# Patient Record
Sex: FEMALE | Race: WHITE | NOT HISPANIC OR LATINO | Employment: OTHER | URBAN - METROPOLITAN AREA
[De-identification: names, ages, dates, MRNs, and addresses within clinical notes are randomized per-mention and may not be internally consistent; named-entity substitution may affect disease eponyms.]

---

## 2017-02-28 ENCOUNTER — TELEPHONE (OUTPATIENT)
Dept: MEDICAL GROUP | Facility: PHYSICIAN GROUP | Age: 67
End: 2017-02-28

## 2017-02-28 DIAGNOSIS — Z01.812 PRE-PROCEDURE LAB EXAM: ICD-10-CM

## 2017-02-28 NOTE — TELEPHONE ENCOUNTER
VOICEMAIL  1. Caller Name: [facility]                       Call Back Number: [phone] (home)       2. Message: Pt needs STAT labs for her MRI in the morning.    3. Patient approves office to leave a detailed voicemail/MyChart message: N\A

## 2017-03-02 ENCOUNTER — APPOINTMENT (OUTPATIENT)
Dept: RADIOLOGY | Facility: MEDICAL CENTER | Age: 67
End: 2017-03-02
Attending: FAMILY MEDICINE
Payer: MEDICARE

## 2017-03-16 ENCOUNTER — APPOINTMENT (OUTPATIENT)
Dept: MEDICAL GROUP | Facility: PHYSICIAN GROUP | Age: 67
End: 2017-03-16
Payer: MEDICARE

## 2017-03-30 ENCOUNTER — APPOINTMENT (OUTPATIENT)
Dept: RADIOLOGY | Facility: MEDICAL CENTER | Age: 67
End: 2017-03-30
Attending: FAMILY MEDICINE
Payer: MEDICARE

## 2017-04-06 ENCOUNTER — APPOINTMENT (OUTPATIENT)
Dept: MEDICAL GROUP | Facility: PHYSICIAN GROUP | Age: 67
End: 2017-04-06
Payer: MEDICARE

## 2017-04-10 ENCOUNTER — HOSPITAL ENCOUNTER (OUTPATIENT)
Dept: LAB | Facility: MEDICAL CENTER | Age: 67
End: 2017-04-10
Attending: FAMILY MEDICINE
Payer: MEDICARE

## 2017-04-10 DIAGNOSIS — G56.01 CARPAL TUNNEL SYNDROME ON RIGHT: ICD-10-CM

## 2017-04-10 DIAGNOSIS — E78.5 DYSLIPIDEMIA: ICD-10-CM

## 2017-04-10 DIAGNOSIS — R41.3 MEMORY LOSS: ICD-10-CM

## 2017-04-10 DIAGNOSIS — I10 ESSENTIAL HYPERTENSION: ICD-10-CM

## 2017-04-10 DIAGNOSIS — I73.9 PAD (PERIPHERAL ARTERY DISEASE) (HCC): ICD-10-CM

## 2017-04-10 DIAGNOSIS — F32.A DEPRESSION: ICD-10-CM

## 2017-04-10 DIAGNOSIS — Z01.812 PRE-PROCEDURE LAB EXAM: ICD-10-CM

## 2017-04-10 LAB
BASOPHILS # BLD AUTO: 0.7 % (ref 0–1.8)
BASOPHILS # BLD: 0.05 K/UL (ref 0–0.12)
CREAT SERPL-MCNC: 0.59 MG/DL (ref 0.5–1.4)
EOSINOPHIL # BLD AUTO: 0.13 K/UL (ref 0–0.51)
EOSINOPHIL NFR BLD: 1.7 % (ref 0–6.9)
ERYTHROCYTE [DISTWIDTH] IN BLOOD BY AUTOMATED COUNT: 46.7 FL (ref 35.9–50)
GFR SERPL CREATININE-BSD FRML MDRD: >60 ML/MIN/1.73 M 2
HCT VFR BLD AUTO: 38.2 % (ref 37–47)
HGB BLD-MCNC: 12.7 G/DL (ref 12–16)
IMM GRANULOCYTES # BLD AUTO: 0.03 K/UL (ref 0–0.11)
IMM GRANULOCYTES NFR BLD AUTO: 0.4 % (ref 0–0.9)
LYMPHOCYTES # BLD AUTO: 2.48 K/UL (ref 1–4.8)
LYMPHOCYTES NFR BLD: 32.4 % (ref 22–41)
MCH RBC QN AUTO: 32.6 PG (ref 27–33)
MCHC RBC AUTO-ENTMCNC: 33.2 G/DL (ref 33.6–35)
MCV RBC AUTO: 97.9 FL (ref 81.4–97.8)
MONOCYTES # BLD AUTO: 0.56 K/UL (ref 0–0.85)
MONOCYTES NFR BLD AUTO: 7.3 % (ref 0–13.4)
NEUTROPHILS # BLD AUTO: 4.4 K/UL (ref 2–7.15)
NEUTROPHILS NFR BLD: 57.5 % (ref 44–72)
NRBC # BLD AUTO: 0 K/UL
NRBC BLD AUTO-RTO: 0 /100 WBC
PLATELET # BLD AUTO: 281 K/UL (ref 164–446)
PMV BLD AUTO: 9.5 FL (ref 9–12.9)
RBC # BLD AUTO: 3.9 M/UL (ref 4.2–5.4)
VIT B12 SERPL-MCNC: 546 PG/ML (ref 211–911)
WBC # BLD AUTO: 7.7 K/UL (ref 4.8–10.8)

## 2017-04-10 PROCEDURE — 36415 COLL VENOUS BLD VENIPUNCTURE: CPT

## 2017-04-10 PROCEDURE — 82607 VITAMIN B-12: CPT

## 2017-04-10 PROCEDURE — 85025 COMPLETE CBC W/AUTO DIFF WBC: CPT

## 2017-04-10 PROCEDURE — 82565 ASSAY OF CREATININE: CPT

## 2017-04-13 ENCOUNTER — TELEPHONE (OUTPATIENT)
Dept: MEDICAL GROUP | Facility: PHYSICIAN GROUP | Age: 67
End: 2017-04-13

## 2017-04-13 ENCOUNTER — HOSPITAL ENCOUNTER (OUTPATIENT)
Dept: RADIOLOGY | Facility: MEDICAL CENTER | Age: 67
End: 2017-04-13
Attending: FAMILY MEDICINE
Payer: MEDICARE

## 2017-04-13 DIAGNOSIS — R41.3 MEMORY LOSS: ICD-10-CM

## 2017-04-13 DIAGNOSIS — I67.1 ANEURYSM, CAROTID ARTERY, INTERNAL: ICD-10-CM

## 2017-04-13 PROCEDURE — A9579 GAD-BASE MR CONTRAST NOS,1ML: HCPCS | Performed by: FAMILY MEDICINE

## 2017-04-13 PROCEDURE — 700117 HCHG RX CONTRAST REV CODE 255: Performed by: FAMILY MEDICINE

## 2017-04-13 PROCEDURE — 70553 MRI BRAIN STEM W/O & W/DYE: CPT

## 2017-04-13 RX ADMIN — GADODIAMIDE 2870 MG: 287 INJECTION INTRAVENOUS at 10:05

## 2017-04-13 NOTE — TELEPHONE ENCOUNTER
Phone Number Called: [phone] (home)       Message: Please call radiologist Dr. Wells at [phone]. He would like to talk to you about MRI rslt.     Left Message for patient to call back: N\A

## 2017-04-13 NOTE — TELEPHONE ENCOUNTER
Spoke with Dr. Wells. He explained results of the MRI. He states that they can do angiogram and intervene at that time. He states that neurosurgeons normally don't operate anymore on these cases and send them for interventional radiology. I told Dr. Wells that patient prefers to see the neurologist first. Most likely they would end up sending her for intervention by the radiologist.

## 2017-05-05 ENCOUNTER — TELEPHONE (OUTPATIENT)
Dept: MEDICAL GROUP | Facility: PHYSICIAN GROUP | Age: 67
End: 2017-05-05

## 2017-05-05 DIAGNOSIS — I10 ESSENTIAL HYPERTENSION: ICD-10-CM

## 2017-05-05 RX ORDER — AMLODIPINE BESYLATE 10 MG/1
10 TABLET ORAL DAILY
Qty: 30 TAB | Refills: 3 | Status: SHIPPED | OUTPATIENT
Start: 2017-05-05 | End: 2017-10-01 | Stop reason: SDUPTHER

## 2017-05-05 NOTE — TELEPHONE ENCOUNTER
----- Message from Your Healthcare Team sent at 5/5/2017 10:33 AM PDT -----  Regarding: Prescription Question  Contact: [phone]  Dear Dr. Sarabia,    I have noted that my blood pressure is elevating.  Reading at Dr. Curiel's office was 154/102.  Should I go back to my blood pressure medication?  I don't remember what it was nor do I have any left.  If I should resume the medication, could you please send the RX over to my pharmacy which is [facility] on [facility].  Thanks very much.    Alisa Gandara

## 2017-05-05 NOTE — TELEPHONE ENCOUNTER
Yes please let pt know to restart amlodipine. Continue to monitor BP and follow up with Dr. Sarabia within two weeks  Francoise Elias M.D.

## 2017-05-05 NOTE — TELEPHONE ENCOUNTER
1. Caller Name: Reina Gandara                      Call Back Number: [phone] (home)     2. Message: Pt noticed elevated blood pressure of 154/102 wanted to know if she should go back on bp medication and if so send to [facility] on [facility]. To Dr. Sarabia to advise.     3. Patient approves office to leave a detailed voicemail/MyChart message: N\A

## 2017-05-16 ENCOUNTER — OFFICE VISIT (OUTPATIENT)
Dept: MEDICAL GROUP | Facility: PHYSICIAN GROUP | Age: 67
End: 2017-05-16
Payer: MEDICARE

## 2017-05-16 VITALS
BODY MASS INDEX: 17.47 KG/M2 | DIASTOLIC BLOOD PRESSURE: 60 MMHG | OXYGEN SATURATION: 98 % | HEIGHT: 60 IN | WEIGHT: 89 LBS | RESPIRATION RATE: 20 BRPM | TEMPERATURE: 99.1 F | HEART RATE: 82 BPM | SYSTOLIC BLOOD PRESSURE: 110 MMHG

## 2017-05-16 DIAGNOSIS — E78.5 DYSLIPIDEMIA: ICD-10-CM

## 2017-05-16 DIAGNOSIS — I67.1 ANEURYSM OF CAVERNOUS PORTION OF LEFT INTERNAL CAROTID ARTERY: ICD-10-CM

## 2017-05-16 DIAGNOSIS — I67.1 RIGHT INTERNAL CAROTID ARTERY ANEURYSM: ICD-10-CM

## 2017-05-16 DIAGNOSIS — I73.9 PAD (PERIPHERAL ARTERY DISEASE) (HCC): ICD-10-CM

## 2017-05-16 DIAGNOSIS — I10 ESSENTIAL HYPERTENSION: ICD-10-CM

## 2017-05-16 PROCEDURE — 4040F PNEUMOC VAC/ADMIN/RCVD: CPT | Performed by: FAMILY MEDICINE

## 2017-05-16 PROCEDURE — 3014F SCREEN MAMMO DOC REV: CPT | Mod: 8P | Performed by: FAMILY MEDICINE

## 2017-05-16 PROCEDURE — 3288F FALL RISK ASSESSMENT DOCD: CPT | Performed by: FAMILY MEDICINE

## 2017-05-16 PROCEDURE — 99214 OFFICE O/P EST MOD 30 MIN: CPT | Performed by: FAMILY MEDICINE

## 2017-05-16 PROCEDURE — G8432 DEP SCR NOT DOC, RNG: HCPCS | Performed by: FAMILY MEDICINE

## 2017-05-16 PROCEDURE — 0518F FALL PLAN OF CARE DOCD: CPT | Mod: 8P | Performed by: FAMILY MEDICINE

## 2017-05-16 PROCEDURE — G8419 CALC BMI OUT NRM PARAM NOF/U: HCPCS | Performed by: FAMILY MEDICINE

## 2017-05-16 PROCEDURE — 4004F PT TOBACCO SCREEN RCVD TLK: CPT | Performed by: FAMILY MEDICINE

## 2017-05-16 PROCEDURE — 1100F PTFALLS ASSESS-DOCD GE2>/YR: CPT | Performed by: FAMILY MEDICINE

## 2017-05-16 NOTE — MR AVS SNAPSHOT
Reina Gandara   2017 3:20 PM   Office Visit   MRN: [id number]    Department:  [facility]   Dept Phone:  [phone]    Description:  Female : 1950   Provider:  Soledad Sarabia M.D.           Reason for Visit     Follow-Up 3 month follow up     Other allergies are really bad       Allergies as of 2017     Allergen Noted Reactions    Bee 2014   Anaphylaxis    Codeine 2013   Nausea    dizzy    Keflex 2013   Vomiting    Lisinopril 2013       cough    Nsaids 2013   Swelling    2 trips to ER w reactions of rash swelling       You were diagnosed with     Right internal carotid artery aneurysm   [812544]       Aneurysm of cavernous portion of left internal carotid artery   [447189]       Essential hypertension   [7826050]       Dyslipidemia   [812745]       PAD (peripheral artery disease) (CMS-[facility])   [713247]         Vital Signs     Blood Pressure Pulse Temperature Respirations Height Weight    110/60 mmHg 82 37.3 °C (99.1 °F) 20 1.524 m (5') 40.37 kg (89 lb)    Body Mass Index Oxygen Saturation Smoking Status             17.38 kg/m2 98% Current Every Day Smoker         Basic Information     Date Of Birth Sex Race Ethnicity Preferred Language    1950 Female White Non- English      Your appointments     Aug 16, 2017 10:20 AM   Established Patient with Soledad Sarabia M.D.   [facility] (--)    [address] [address]  [address]  [place] [id number] [phone]           You will be receiving a confirmation call a few days before your appointment from our automated call confirmation system.              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   9/17/2013 - Present    Other specified pre-operative examination Z01.818   10/4/2013 - Present    Dyslipidemia E78.5   8/11/2014 - Present    H/O bee sting allergy Z91.030   8/26/2014 - Present    Depression F32.9   12/9/2014 - Present    Insomnia G47.00   12/9/2014 - Present    Contact dermatitis due to cosmetics L25.0   9/16/2015 - Present    PAD (peripheral artery disease) (CMS-[facility]) I73.9   1/25/2016 - Present    Essential hypertension I10   8/25/2016 - Present      Health Maintenance        Date Due Completion Dates    IMM DTaP/Tdap/Td Vaccine (1 - Tdap) 6/22/1967 ---    MAMMOGRAM 7/20/2016 7/20/2015, 5/19/2014, 10/5/2009, 10/5/2009, 9/9/2008, 9/9/2008, 6/22/2006, 9/27/2004    COLON CANCER SCREENING ANNUAL FIT 11/8/2017 11/8/2016, 8/9/2014    BONE DENSITY 7/20/2020 7/20/2015, 6/22/2006, 9/27/2004            Current Immunizations     13-VALENT PCV PREVNAR 1/25/2016    Influenza Vaccine Adult HD 11/7/2016, 1/25/2016    Influenza Vaccine Quad Inj (Preserved) 12/9/2014    Pneumococcal polysaccharide vaccine (PPSV-23) 7/9/2013  1:49 PM    SHINGLES VACCINE 8/6/2014      Below and/or attached are the medications your provider expects you to take. Review all of your home medications and newly ordered medications with your provider and/or pharmacist. Follow medication instructions as directed by your provider and/or pharmacist. Please keep your medication list with you and share with your provider. Update the information when medications are discontinued, doses are changed, or new medications (including over-the-counter products) are added; and carry medication information at all times in the event of emergency situations     Allergies:  BEE - Anaphylaxis     CODEINE - Nausea     KEFLEX - Vomiting     LISINOPRIL - (reactions not documented)     NSAIDS - Swelling               Medications  Valid  as of: May 16, 2017 -  3:52 PM    Generic Name Brand Name Tablet Size Instructions for use    Amitriptyline HCl (Tab) ELAVIL 25 MG Take 25 mg by mouth every evening.        AmLODIPine Besylate (Tab) NORVASC 10 MG Take 1 Tab by mouth every day.        Ascorbic Acid (Tab CR) Vitamin C CR 1000 MG Take  by mouth.        Atorvastatin Calcium (Tab) LIPITOR 40 MG TAKE 1 TAB BY MOUTH EVERY BEDTIME.        Cholecalciferol (Cap) Vitamin D3 2000 UNIT Take  by mouth.        Clobetasol Propionate (Cream) TEMOVATE 0.05 % Apply to affected areas twice daily        Clopidogrel Bisulfate (Tab) PLAVIX 75 MG Take 1 Tab by mouth every day.        EPINEPHrine (Solution Auto-injector) EPIPEN 0.3 MG/0.3ML 1 PEN by Injection route as needed. Indications: Life-Threatening Allergic Reaction        Fluticasone Propionate (Suspension) FLONASE 50 MCG/ACT Spray 2 Sprays in nose every day.        Gabapentin (Tab) NEURONTIN 800 MG Take 300 mg by mouth 2 times a day.        Multiple Vitamin   Take  by mouth.        Omega-3 Fatty Acids   Take  by mouth.        PARoxetine HCl (Tab) PAXIL 10 MG TAKE 1 TABLET BY MOUTH EVERY DAY WITH FOOD        Pravastatin Sodium (Tab) PRAVACHOL 20 MG TAKE 1 TABLET BY MOUTH AT BEDTIME        Pravastatin Sodium (Tab) PRAVACHOL 40 MG Take 1 Tab by mouth every bedtime.        Triamcinolone Acetonide (Ointment) KENALOG 0.1 % Apply 0.5 g to affected area(s) 2 times a day.        .                 Medicines prescribed today were sent to:     [id number] - [place] - [address] [address]    Phone: [phone] Fax: [phone]    Open 24 Hours?: No      Medication refill instructions:       If your prescription bottle indicates you have medication refills left, it is not necessary to call your provider’s office. Please contact your pharmacy and they will refill your medication.    If your prescription bottle indicates you do not have any refills left, you may request refills at  any time through one of the following ways: The online [email or web address] system (except Urgent Care), by calling your provider’s office, or by asking your pharmacy to contact your provider’s office with a refill request. Medication refills are processed only during regular business hours and may not be available until the next business day. Your provider may request additional information or to have a follow-up visit with you prior to refilling your medication.   *Please Note: Medication refills are assigned a new Rx number when refilled electronically. Your pharmacy may indicate that no refills were authorized even though a new prescription for the same medication is available at the pharmacy. Please request the medicine by name with the pharmacy before contacting your provider for a refill.        Your To Do List     Future Labs/Procedures Complete By Expires    COMP METABOLIC PANEL  As directed 5/17/2018    LIPID PROFILE  As directed 5/17/2018      Other Notes About Your Plan     DOES PATIENT HAVE A HISTORY OF ALCOHOL OR OPIOID DEPENDENCE? This was diagnosed in 2014. Please add if this is the case. Thanks!           [email or web address] Access Code: Activation code not generated  Current [email or web address] Status: Active          Quit Tobacco Information     Do you want to quit using tobacco?    Quitting tobacco decreases risks of cancer, heart and lung disease, increases life expectancy, improves sense of taste and smell, and increases spending money, among other benefits.    If you are thinking about quitting, we can help.  • [facility] Quit Tobacco Program: [phone]  o Program occurs weekly for four weeks and includes pharmacist consultation on products to support quitting smoking or chewing tobacco. A provider referral is needed for pharmacist consultation.  • Tobacco Users Help Hotline: 1-347-QUIT-NOW (608-9783) or https://[place].quitlogix.org/  o Free, confidential telephone and online coaching for [place] residents. Sessions are designed on  a schedule that is convenient for you. Eligible clients receive free nicotine replacement therapy.  • Nationally: www.smokefree.gov  o Information and professional assistance to support both immediate and long-term needs as you become, and remain, a non-smoker. Smokefree.gov allows you to choose the help that best fits your needs.

## 2017-05-17 NOTE — PROGRESS NOTES
Subjective:      Reina Gandara is a 66 y.o. female who presents with Follow-Up and Other            Other      Patient returns for follow-up of her medical problems.    We found right internal carotid artery aneurysm 1 cm and left cavernous internal carotid artery aneurysm 7 mm when we did MRI of the brain for evaluation of memory loss. I referred her to a neurosurgeon and she was recommended coil repair. She was given option of doing endovascular versus open procedure. She is not decided that on which route to proceed. She denies any headache, dizziness, lightheadedness.    We started her back on amlodipine because her blood pressure started to run high last week. She said that the neurosurgeon's office it was 156/102. Patient was on amlodipine for hypertension in the past but this was discontinued when her blood pressure started to run very good and she did not need the medication anymore. Her blood pressure is down to normal level on the medication. She has no side effects.    In terms of her dyslipidemia, she continues to take atorvastatin without any myalgias.    She continues to take Plavix for her peripheral arterial disease. She was told by the neurosurgeon that she can continue taking the Plavix. She is status post femoropopliteal of the left lower extremity in 2013. She has been asymptomatic without any claudication    Past medical history, past surgical history, family history reviewed-no changes    Social history reviewed-no changes    Allergies reviewed-no changes    Medications reviewed-no changes    ROS     Review of systems as per history of present illness, the rest are negative.     Objective:     /60 mmHg  Pulse 82  Temp(Src) 37.3 °C (99.1 °F)  Resp 20  Ht 1.524 m (5')  Wt 40.37 kg (89 lb)  BMI 17.38 kg/m2  SpO2 98%     Physical Exam     Examined alert, awake, oriented, not in distress    Neck-supple, no lymphadenopathy, no thyromegaly  Lungs-clear to auscultation, no rales, no  wheezes  Heart-regular rate and rhythm, no murmur  Extremities-mild nonpitting edema of the left foot which is chronic finding due to previous femoropopliteal surgery, no clubbing, no cyanosis       No visits with results within 1 Month(s) from this visit.  Latest known visit with results is:    Hospital Outpatient Visit on 04/10/2017   Component Date Value   • WBC 04/10/2017 7.7    • RBC 04/10/2017 3.90*   • Hemoglobin 04/10/2017 12.7    • Hematocrit 04/10/2017 38.2    • MCV 04/10/2017 97.9*   • MCH 04/10/2017 32.6    • MCHC 04/10/2017 33.2*   • RDW 04/10/2017 46.7    • Platelet Count 04/10/2017 281    • MPV 04/10/2017 9.5    • Neutrophils-Polys 04/10/2017 57.50    • Lymphocytes 04/10/2017 32.40    • Monocytes 04/10/2017 7.30    • Eosinophils 04/10/2017 1.70    • Basophils 04/10/2017 0.70    • Immature Granulocytes 04/10/2017 0.40    • Nucleated RBC 04/10/2017 0.00    • Neutrophils (Absolute) 04/10/2017 4.40    • Lymphs (Absolute) 04/10/2017 2.48    • Monos (Absolute) 04/10/2017 0.56    • Eos (Absolute) 04/10/2017 0.13    • Baso (Absolute) 04/10/2017 0.05    • Immature Granulocytes (a* 04/10/2017 0.03    • NRBC (Absolute) 04/10/2017 0.00    • Vitamin B12 -True Cobala* 04/10/2017 546    • Creatinine 04/10/2017 0.59    • GFR If  04/10/2017 >60    • GFR If Non  Ameri* 04/10/2017 >60         Assessment/Plan:     1. Right internal carotid artery aneurysm  She said she will proceed with the coiling but she doesn't know yet if she wants the endovascular versus open procedure. She is currently asymptomatic.  - LIPID PROFILE; Future  - COMP METABOLIC PANEL; Future    2. Aneurysm of cavernous portion of left internal carotid artery  Same as #1.  - LIPID PROFILE; Future  - COMP METABOLIC PANEL; Future    3. Essential hypertension  Blood pressure is now normal on amlodipine. Continue amlodipine.  - LIPID PROFILE; Future  - COMP METABOLIC PANEL; Future    4. Dyslipidemia  We will check lipid panel.  Continue atorvastatin.  - LIPID PROFILE; Future  - COMP METABOLIC PANEL; Future    5. PAD (peripheral artery disease) (CMS-HCC)  Status post femoropopliteal left lower extremity. Currently asymptomatic on Plavix.  - LIPID PROFILE; Future  - COMP METABOLIC PANEL; Future      Please note that this dictation was created using voice recognition software. I have worked with consultants from the vendor as well as technical experts from [facility]  [employer] to optimize the interface. I have made every reasonable attempt to correct obvious errors, but I expect that there are errors of grammar and possibly content I did not discover before finalizing the note.

## 2017-06-05 ENCOUNTER — PATIENT OUTREACH (OUTPATIENT)
Dept: HEALTH INFORMATION MANAGEMENT | Facility: OTHER | Age: 67
End: 2017-06-05

## 2017-06-05 NOTE — PROGRESS NOTES
Outcome: No Answer- Vm full    WebIZ Checked & Epic Updated:  yes    HealthConnect Verified: yes    Attempt # 1

## 2017-06-19 NOTE — PROGRESS NOTES
Attempt #:2    WebIZ Checked & Epic Updated: yes  HealthConnect Verified: yes  Verify PCP: yes    Communication Preference Obtained: yes     Review Care Team: yes    Annual Wellness Visit Scheduling  1. Scheduling Status:Scheduled     Care Gap Scheduling (Attempt to Schedule EACH Overdue Care Gap!)     Health Maintenance Due   Topic Date Due   • IMM DTaP/Tdap/Td Vaccine (1 - Tdap) Scheduled   • MAMMOGRAM  Declined- Will discuss with PCP         [employer] Activation: already active  Linio Deisrae: no  Virtual Visits: no  Opt In to Text Messages: no

## 2017-07-17 RX ORDER — CLOPIDOGREL BISULFATE 75 MG/1
TABLET ORAL
Qty: 90 TAB | Refills: 3 | Status: SHIPPED | OUTPATIENT
Start: 2017-07-17 | End: 2018-01-16

## 2017-08-16 ENCOUNTER — APPOINTMENT (OUTPATIENT)
Dept: MEDICAL GROUP | Facility: PHYSICIAN GROUP | Age: 67
End: 2017-08-16
Payer: MEDICARE

## 2017-08-30 ENCOUNTER — TELEPHONE (OUTPATIENT)
Dept: MEDICAL GROUP | Facility: PHYSICIAN GROUP | Age: 67
End: 2017-08-30

## 2017-08-30 NOTE — TELEPHONE ENCOUNTER
ANNUAL WELLNESS VISIT PRE-VISIT PLANNING     1.  Reviewed note from last office visit with PCP: YES    2.  If any orders were placed at last visit, do we have Results/Consult Notes?        •  Labs - Labs ordered, NOT completed. Patient advised to complete prior to next appointment.       •  Imaging - Imaging was not ordered at last office visit.       •  Referrals - No referrals were ordered at last office visit.    3.  Immunizations were updated in [facility] using WebIZ?: Yes       •  WebIZ Recommendations: TDAP       •  Is patient due for Tdap? YES. Patient was not notified of copay.       •  Is patient due for Shingles? NO     4.  Patient is due for the following Health Maintenance Topics:   Health Maintenance Due   Topic Date Due   • IMM DTaP/Tdap/Td Vaccine (1 - Tdap) 06/22/1967   • MAMMOGRAM  07/20/2016   • IMM INFLUENZA (1) 09/01/2017       - Patient has completed FLU, PNEUMOVAX (PPSV23), PREVNAR (PCV13)  and ZOSTAVAX (Shingles) Immunization(s) per WebIZ. Chart has been updated.      5.  Reviewed/Updated the following with patient:       •   Preferred Pharmacy? YES       •   Preferred Lab? YES       •   Medications? YES. Was Abstract Encounter opened and chart updated? YES       •   Social History? YES. Was Abstract Encounter opened and chart updated? YES       •   Family History? YES. Was Abstract Encounter opened and chart updated? YES    6.  Care Team Updated:       •   DME Company (gait device, O2, CPAP, etc.): NO       •   Other Specialists (eye doctor, derm, GYN, cardiology, endo, etc): YES    7.  Patient has the following Care Path diagnoses on Problem List:  NONE    8.  Specialty Comments was updated with diagnosis information provided by [facility]: NO    9.  Patient was advised: “This is a free wellness visit. The provider will screen for medical conditions to help you stay healthy. If you have other concerns to address you may be asked to discuss these at a separate visit or there may be an additional fee.”      6.  Patient was informed to arrive 15 min prior to their scheduled appointment and bring in their medication bottles.

## 2017-10-01 DIAGNOSIS — I10 ESSENTIAL HYPERTENSION: ICD-10-CM

## 2017-10-02 RX ORDER — AMLODIPINE BESYLATE 10 MG/1
10 TABLET ORAL DAILY
Qty: 30 TAB | Refills: 5 | Status: SHIPPED | OUTPATIENT
Start: 2017-10-02 | End: 2018-01-26 | Stop reason: SDUPTHER

## 2017-10-16 ENCOUNTER — HOSPITAL ENCOUNTER (OUTPATIENT)
Dept: RADIOLOGY | Facility: MEDICAL CENTER | Age: 67
End: 2017-10-16
Attending: NEUROLOGICAL SURGERY
Payer: MEDICARE

## 2017-10-16 DIAGNOSIS — I67.1 CEREBRAL ANEURYSM, NONRUPTURED: ICD-10-CM

## 2017-10-16 PROCEDURE — 70544 MR ANGIOGRAPHY HEAD W/O DYE: CPT

## 2017-11-09 ENCOUNTER — TELEPHONE (OUTPATIENT)
Dept: MEDICAL GROUP | Facility: PHYSICIAN GROUP | Age: 67
End: 2017-11-09

## 2017-11-10 NOTE — TELEPHONE ENCOUNTER
ANNUAL WELLNESS VISIT PRE-VISIT PLANNING     1.  Reviewed note from last office visit with PCP: YES    2.  If any orders were placed at last visit, do we have Results/Consult Notes?        •  Labs - Labs ordered, NOT completed. Patient advised to complete prior to next appointment.   Note: If patient appointment is for lab review and patient did not complete labs, check with provider if OK to reschedule patient until labs completed.       •  Imaging - Imaging was not ordered at last office visit.       •  Referrals - No referrals were ordered at last office visit.    3.  Immunizations were updated in [facility] using WebIZ?: Yes       •  WebIZ Recommendations: TDAP. Flu       •  Is patient due for Tdap? YES       •  Is patient due for Shingles? YES. Patient was notified of copay/out of pocket cost.     4.  Patient is due for the following Health Maintenance Topics:   Health Maintenance Due   Topic Date Due   • IMM DTaP/Tdap/Td Vaccine (1 - Tdap) 06/22/1967   • MAMMOGRAM  07/20/2016   • IMM INFLUENZA (1) 09/01/2017   • Annual Wellness Visit  11/06/2017   • COLON CANCER SCREENING ANNUAL FIT  11/08/2017       - Patient has completed FLU, PNEUMOVAX (PPSV23), PREVNAR (PCV13)  and ZOSTAVAX (Shingles) Immunization(s) per WebIZ. Chart has been updated.      5.  Reviewed/Updated the following with patient:       •   Preferred Pharmacy? YES       •   Preferred Lab? YES       •   Preferred Communication? YES       •   Allergies? YES       •   Medications? YES. Was Abstract Encounter opened and chart updated? YES       •   Social History? YES. Was Abstract Encounter opened and chart updated? YES       •   Family History (document living status of immediate family members and if + hx of cancer, diabetes, hypertension, hyperlipidemia, heart attack, stroke) YES. Was Abstract Encounter opened and chart updated? YES    6.  Care Team Updated:       •   DME Company (gait device, O2, CPAP, etc.): NO       •   Other Specialists (eye doctor,  derm, GYN, cardiology, endo, etc): NO    7.  Patient has the following Care Path diagnoses on Problem List:  NONE    8.  Specialty Comments was updated with diagnosis information provided by [facility]: NO    9.  Patient was advised: “This is a free wellness visit. The provider will screen for medical conditions to help you stay healthy. If you have other concerns to address you may be asked to discuss these at a separate visit or there may be an additional fee.”     6.  Patient was informed to arrive 15 min prior to their scheduled appointment and bring in their medication bottles.

## 2017-11-16 ENCOUNTER — APPOINTMENT (OUTPATIENT)
Dept: MEDICAL GROUP | Facility: PHYSICIAN GROUP | Age: 67
End: 2017-11-16
Payer: MEDICARE

## 2018-01-11 ENCOUNTER — HOSPITAL ENCOUNTER (OUTPATIENT)
Dept: LAB | Facility: MEDICAL CENTER | Age: 68
End: 2018-01-11
Attending: FAMILY MEDICINE
Payer: MEDICARE

## 2018-01-11 DIAGNOSIS — I10 ESSENTIAL HYPERTENSION: ICD-10-CM

## 2018-01-11 DIAGNOSIS — E78.5 DYSLIPIDEMIA: ICD-10-CM

## 2018-01-11 DIAGNOSIS — I73.9 PAD (PERIPHERAL ARTERY DISEASE) (HCC): ICD-10-CM

## 2018-01-11 DIAGNOSIS — I67.1 RIGHT INTERNAL CAROTID ARTERY ANEURYSM: ICD-10-CM

## 2018-01-11 DIAGNOSIS — I67.1 ANEURYSM OF CAVERNOUS PORTION OF LEFT INTERNAL CAROTID ARTERY: ICD-10-CM

## 2018-01-11 LAB
ALBUMIN SERPL BCP-MCNC: 4.5 G/DL (ref 3.2–4.9)
ALBUMIN/GLOB SERPL: 1.9 G/DL
ALP SERPL-CCNC: 72 U/L (ref 30–99)
ALT SERPL-CCNC: 14 U/L (ref 2–50)
ANION GAP SERPL CALC-SCNC: 8 MMOL/L (ref 0–11.9)
AST SERPL-CCNC: 19 U/L (ref 12–45)
BILIRUB SERPL-MCNC: 0.6 MG/DL (ref 0.1–1.5)
BUN SERPL-MCNC: 10 MG/DL (ref 8–22)
CALCIUM SERPL-MCNC: 9 MG/DL (ref 8.5–10.5)
CHLORIDE SERPL-SCNC: 106 MMOL/L (ref 96–112)
CHOLEST SERPL-MCNC: 238 MG/DL (ref 100–199)
CO2 SERPL-SCNC: 25 MMOL/L (ref 20–33)
CREAT SERPL-MCNC: 0.46 MG/DL (ref 0.5–1.4)
GLOBULIN SER CALC-MCNC: 2.4 G/DL (ref 1.9–3.5)
GLUCOSE SERPL-MCNC: 89 MG/DL (ref 65–99)
HDLC SERPL-MCNC: 72 MG/DL
LDLC SERPL CALC-MCNC: 121 MG/DL
POTASSIUM SERPL-SCNC: 4.3 MMOL/L (ref 3.6–5.5)
PROT SERPL-MCNC: 6.9 G/DL (ref 6–8.2)
SODIUM SERPL-SCNC: 139 MMOL/L (ref 135–145)
TRIGL SERPL-MCNC: 227 MG/DL (ref 0–149)

## 2018-01-11 PROCEDURE — 80053 COMPREHEN METABOLIC PANEL: CPT

## 2018-01-11 PROCEDURE — 80061 LIPID PANEL: CPT

## 2018-01-11 PROCEDURE — 36415 COLL VENOUS BLD VENIPUNCTURE: CPT

## 2018-01-16 ENCOUNTER — OFFICE VISIT (OUTPATIENT)
Dept: MEDICAL GROUP | Facility: LAB | Age: 68
End: 2018-01-16
Payer: MEDICARE

## 2018-01-16 VITALS
OXYGEN SATURATION: 98 % | RESPIRATION RATE: 12 BRPM | TEMPERATURE: 98.8 F | BODY MASS INDEX: 17.18 KG/M2 | HEIGHT: 61 IN | SYSTOLIC BLOOD PRESSURE: 102 MMHG | DIASTOLIC BLOOD PRESSURE: 62 MMHG | WEIGHT: 91 LBS | HEART RATE: 87 BPM

## 2018-01-16 DIAGNOSIS — R92.30 DENSE BREAST TISSUE: ICD-10-CM

## 2018-01-16 DIAGNOSIS — L24.89 IRRITANT CONTACT DERMATITIS DUE TO OTHER AGENTS: ICD-10-CM

## 2018-01-16 DIAGNOSIS — E78.5 DYSLIPIDEMIA: ICD-10-CM

## 2018-01-16 DIAGNOSIS — Z87.442 HISTORY OF KIDNEY STONES: ICD-10-CM

## 2018-01-16 DIAGNOSIS — I72.9 ANEURYSM (HCC): ICD-10-CM

## 2018-01-16 DIAGNOSIS — R79.89 ABNORMAL TSH: ICD-10-CM

## 2018-01-16 DIAGNOSIS — Z91.030 H/O BEE STING ALLERGY: ICD-10-CM

## 2018-01-16 DIAGNOSIS — Z23 NEED FOR VACCINATION: ICD-10-CM

## 2018-01-16 DIAGNOSIS — I10 ESSENTIAL HYPERTENSION: ICD-10-CM

## 2018-01-16 DIAGNOSIS — Z86.69 HX OF GUILLAIN-BARRE SYNDROME: ICD-10-CM

## 2018-01-16 DIAGNOSIS — I73.9 PAD (PERIPHERAL ARTERY DISEASE) (HCC): ICD-10-CM

## 2018-01-16 DIAGNOSIS — Z87.898 HISTORY OF SEIZURES: ICD-10-CM

## 2018-01-16 DIAGNOSIS — Z12.11 SPECIAL SCREENING FOR MALIGNANT NEOPLASM OF COLON: ICD-10-CM

## 2018-01-16 PROCEDURE — G0008 ADMIN INFLUENZA VIRUS VAC: HCPCS | Performed by: NURSE PRACTITIONER

## 2018-01-16 PROCEDURE — 90662 IIV NO PRSV INCREASED AG IM: CPT | Performed by: NURSE PRACTITIONER

## 2018-01-16 PROCEDURE — 99215 OFFICE O/P EST HI 40 MIN: CPT | Mod: 25 | Performed by: NURSE PRACTITIONER

## 2018-01-16 RX ORDER — EPINEPHRINE 0.3 MG/.3ML
0.3 INJECTION SUBCUTANEOUS PRN
Qty: 1 EACH | Status: ON HOLD | OUTPATIENT
Start: 2018-01-16 | End: 2018-05-25

## 2018-01-16 ASSESSMENT — PATIENT HEALTH QUESTIONNAIRE - PHQ9
5. POOR APPETITE OR OVEREATING: 0 - NOT AT ALL
CLINICAL INTERPRETATION OF PHQ2 SCORE: 2

## 2018-01-16 NOTE — PROGRESS NOTES
CC  Est care and f/u on chronic issue    HPI   Alisa is a 67-year-old female, new patient to me today. She is here to establish care and go over chronic issues.    Irritant contact dermatitis due to other agents  Chronic issue.  Caused by occasional cosmetics and relieved by Clobetasol.      Dyslipidemia  Chronic issue.  Lipid improvement was not significant with pravastatin and now does well with atorvastatin.  + PVD and brain aneurysms.   Took a break from atorvastatin for a few months and restarted it as soon as she saw her labs last week.      H/O bee sting allergy  Anaphylactic rxns to bee stings and all stinging insects.  Also allergic to all nsaids.  Has an epipen but it's  by one year.      PAD (peripheral artery disease) (CMS-HCC)  Chronic issue.  On plavix and statin.  Had an episode in  of limb ischemia fixed by Dr. Durbin - right side -stented, left also had an issue and that was fixed with balloon per patient.  Had quite a bit of pain for a year afterwards and had to take gabapentin but off now.  Occasional swelling in left foot but no other symptoms such as lower extremity pain with walking.      History of seizures  Last episode was in  in the middle of the night.  Had reoccurrence quickly thereafter and was told that she had and AV problem but arteriogram was negative.      History of kidney stones  As a child and had recurrent pyelonephritis.  No reoccurrence in about 20 years. Denies any problems with urination such as hematuria or dysuria.    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 Had lower extremity weakness for weeks per pt. has done very well with influenza vaccines in the past and is requesting a flu shot today.     Essential hypertension  Chronic issue.   Controlled well with amlodipine.  No CP or ankle swelling.      Past medical, surgical, family, and social history is reviewed and updated in Epic chart by me today.   Medications and allergies reviewed and updated in  "Epic chart by me today.     ROS:   As documented in history of present illness above    Exam:  Blood pressure 102/62, pulse 87, temperature 37.1 °C (98.8 °F), resp. rate 12, height 1.549 m (5' 1\"), weight 41.3 kg (91 lb), SpO2 98 %.  Constitutional: Alert, no distress, plus 3 vital signs  Skin:  Warm, dry, no rashes invisible areas  Eye: Equal, round and reactive, conjunctiva clear  ENMT: Lips without lesions, good dentition, oropharynx clear    Neck: Trachea midline  Respiratory: Unlabored respiration  Cardiovascular: Normal rate  Psych: Alert, pleasant, well-groomed, normal affect    A/P:  1. Aneurysm (CMS-HCC)  -This is followed by her neurosurgeon, Dr. Curiel    2. Irritant contact dermatitis due to other agents  -Stable with as needed use of clobetasol    3. Dyslipidemia  -She has restarted her atorvastatin and we'll recheck labs in 3 months.  - TSH WITH REFLEX TO FT4; Future  - LIPID PROFILE; Future    4. H/O bee sting allergy  -Refilled her EpiPen and reminded her of use as well as importance of calling 911 with any symptoms of anaphylactic reaction  - EPINEPHrine (EPIPEN 2-SHAVON) 0.3 MG/0.3ML Solution Auto-injector solution for injection; 0.3 mL by Intramuscular route as needed.  Dispense: 1 Each; Refill: prn    5. PAD (peripheral artery disease) (CMS-HCC)  -Asymptomatic. Continue on statin and Plavix. Follow-up as needed.    6. History of seizures  -This has not occurred in over 60 years.    7. History of kidney stones  -No occurrence within 20 years. Encouraged good hydration.    8. Hx of Guillain-[place] syndrome    9. Essential hypertension  -Stable. Continue same.    10. Dense breast tissue  -She does not want to do a mammogram but is willing to consider the below testing if covered by insurance.  - US-SCREENING BREAST (SONOCINE); Standing  - US-SCREENING BREAST (SONOCINE)    11. Special screening for malignant neoplasm of colon  -Declines a referral for a full colonoscopy, stating that she has heard a lot " of bad stories about complications during colonoscopies but she is willing to do a fecal occult blood test.  - OCCULT BLOOD FECES IMMUNOASSAY (FIT); Future    12. Need for vaccination  -I have placed the below orders and discussed them with Dr. Gentile. the MA is performing the below orders under the direction of Dr. NINA.  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  - INFLUENZA VACCINE, HIGH DOSE (65+ ONLY)    13. Abnormal TSH  -She reports that over 5 years ago she had a low TSH. We'll check a TSH with reflex to T4 when she repeat her lipid panel in 3 months.  - TSH WITH REFLEX TO FT4; Future    Total face to face time 40 minutes of which over 50% of this visit is spent in counseling, education and outlining a plan of treatment and coordination of care for the above conditions. This included but was not limited to discussion of medication options and potential risks related to the medications, referral and specialty care options. All patient questions were answered

## 2018-01-16 NOTE — ASSESSMENT & PLAN NOTE
Chronic issue.  On plavix and statin.  Had an episode in 2013 of limb ischemia fixed by Dr. Durbin - right side -stented, left also had an issue and that was fixed with balloon.  Had quite a bit of pain for a year afterwards and had to take gabapentin but off now.  Occasional swelling in left foot.

## 2018-01-16 NOTE — ASSESSMENT & PLAN NOTE
Anaphylactic rxns to bee stings and all stinging insects.  Also allergic to all nsaids.  Has an epipen but it's  by one year.

## 2018-01-16 NOTE — ASSESSMENT & PLAN NOTE
Last episode was in 1980's in the middle of the night.  Had reoccurrence quickly thereafter and was told that she had and AV problem but arteriogram was negative.

## 2018-01-26 DIAGNOSIS — I10 ESSENTIAL HYPERTENSION: ICD-10-CM

## 2018-01-26 RX ORDER — AMLODIPINE BESYLATE 10 MG/1
TABLET ORAL
Qty: 30 TAB | Refills: 0 | Status: SHIPPED | OUTPATIENT
Start: 2018-01-26 | End: 2018-05-23

## 2018-02-22 DIAGNOSIS — I72.9 ANEURYSM (HCC): ICD-10-CM

## 2018-03-03 DIAGNOSIS — I73.9 PAD (PERIPHERAL ARTERY DISEASE) (HCC): ICD-10-CM

## 2018-03-05 RX ORDER — CLOPIDOGREL BISULFATE 75 MG/1
75 TABLET ORAL
Qty: 90 TAB | Refills: 0 | Status: SHIPPED | OUTPATIENT
Start: 2018-03-05 | End: 2018-07-22 | Stop reason: SDUPTHER

## 2018-03-15 ENCOUNTER — HOSPITAL ENCOUNTER (OUTPATIENT)
Dept: RADIOLOGY | Facility: MEDICAL CENTER | Age: 68
End: 2018-03-15
Attending: NURSE PRACTITIONER

## 2018-03-15 DIAGNOSIS — I72.9 ANEURYSM (HCC): ICD-10-CM

## 2018-03-15 NOTE — CONSULTS
"Neuro Interventional Service Consultation      Re: Reina Gandara     MRN: [id number]   : 1950    Reina Gandara was referred to our service by Maria Eugenia DEE.  She is a 67 y.o. female seen in clinic for evaluation and possible intervention for 3 unruptured intracranial aneurysms. She is also under the care of Carina Durbin MD.    History of Present Illness:   presented with memory loss, which prompted workup for dementia. An MRA was ultimately performed, which revealed three unruptured aneurysms: 11 mm sized aneurysm from the ophthalmic segment of the right internal carotid artery, 8 x 5 mm sized wide necked aneurysm from the left cavernous segment of the internal carotid artery, and 5 x 3 mm sized aneurysm of the right middle cerebral artery bifurcation. She denies vision changes, headaches, or focal weakness. She complains of right finger numbness in the morning initially but resolves quickly. She has a history of surgery for left foot arterial occlusion but denies problems with left leg cramping and pain with walking. She takes Plavix for this problem and complains of easy bruising and bleeding. She describes two isolated incidences of seizure in  3-4 months apart. She has not had seizures before or since that time, and sought care. She states she was diagnosed with an \"AV anomaly\" and maybe even an aneurysm, but did undergo a catheter angiogram with no reported evidence of aneurysm; however her recollection of those events are poor. She recalls having \"white spots\" in her vision after the angiogram. She reports hypertension that is controlled. She does not know her mother's family history but reports a paternal uncle who  from an aneurysm rupture, as well as several more distant paternal relatives who had \"brain hemorrhage.\" She is a daily smoker. She is accompanied to the appointment by her .     She is seen today for review of imaging studies and discussion of " possible aneurysm intervention.     Past Medical History:   Diagnosis Date   • Brain aneurysm    • Contact dermatitis due to cosmetics 9/16/2015   • Guillain Barré syndrome (CMS-HCC)     Hx    • History of kidney stones     x2 episodes   • History of seizures     x2 episodes - no treatment needed   • HTN (hypertension) 10/2013    not on meds at this time   • Hyperlipidemia    • Hypertension    • Left foot pain 8/12/2013   • PAD (peripheral artery disease) 5/7/2014   • Personal history of venous thrombosis and embolism     abdominal   • Psychiatric problem     depression   • Reactive depression 9/17/2013   • Renal disorder     stones   • Seizure (CMS-HCC) 1982    unknown cause     Past Surgical History:   Procedure Laterality Date   • RECOVERY  10/4/2013    Performed by [facility] at SURGERY SAME DAY [facility]   • FEMORAL POPLITEAL BYPASS  7/7/2013    Performed by Carina Durbin M.D. at SURGERY [facility] ORS   • RECOVERY  7/6/2013    Performed by [facility] at SURGERY [facility]   • ABDOMINAL HYSTERECTOMY TOTAL  1981    w/o BSO due to fibroids   • APPENDECTOMY      during hysterectomy   • OTHER      neuroma removed from feet 4-5x   • OTHER      abdominal stent   • TONSILLECTOMY     • WRIST ORIF       Social History     Social History   • Marital status:      Spouse name: N/A   • Number of children: N/A   • Years of education: N/A     Occupational History   •  [employer]     Social History Main Topics   • Smoking status: Current Some Day Smoker     Packs/day: 0.25     Years: 52.00     Types: Cigarettes   • Smokeless tobacco: Never Used      Comment: 1/2 ppd down to 4 cig/day since 4/13, down to 2 cig/day since 1014   • Alcohol use 0.6 oz/week     1 Glasses of wine per week      Comment: 1-2 glasses wine/week   • Drug use: No   • Sexual activity: Not Currently     Partners: Male      Comment: one son; ;  wk: insurance     Other Topics Concern    • Not on file     Social History Narrative   • No narrative on file     Family History   Problem Relation Age of Onset   • Other Sister      tremors / instability   • Other Paternal Grandfather      brain aneurysm     Review of Systems:  Constitutional: negative  Eyes: positive for contacts/glasses, negative for visual disturbance  Respiratory: negative  Hematologic/lymphatic: positive for bleeding and easy bruising  Musculoskeletal:negative for left foot discoloration or cramping in leg with walking  Neurological: positive for coordination problems, dizziness, gait problems, paresthesia and weakness, negative for memory problems  A comprehensive 14-point review of systems was negative except as described above.     Labs:      Ref. Range 1/11/2018 09:38   Sodium Latest Ref Range: 135 - 145 mmol/L 139   Potassium Latest Ref Range: 3.6 - 5.5 mmol/L 4.3   Chloride Latest Ref Range: 96 - 112 mmol/L 106   Co2 Latest Ref Range: 20 - 33 mmol/L 25   Anion Gap Latest Ref Range: 0.0 - 11.9  8.0   Glucose Latest Ref Range: 65 - 99 mg/dL 89   Bun Latest Ref Range: 8 - 22 mg/dL 10   Creatinine Latest Ref Range: 0.50 - 1.40 mg/dL 0.46 (L)   GFR If  Latest Ref Range: >60 mL/min/1.73 m 2 >60   GFR If Non  Latest Ref Range: >60 mL/min/1.73 m 2 >60   Calcium Latest Ref Range: 8.5 - 10.5 mg/dL 9.0   AST(SGOT) Latest Ref Range: 12 - 45 U/L 19   ALT(SGPT) Latest Ref Range: 2 - 50 U/L 14   Alkaline Phosphatase Latest Ref Range: 30 - 99 U/L 72   Total Bilirubin Latest Ref Range: 0.1 - 1.5 mg/dL 0.6   Albumin Latest Ref Range: 3.2 - 4.9 g/dL 4.5   Total Protein Latest Ref Range: 6.0 - 8.2 g/dL 6.9   Globulin Latest Ref Range: 1.9 - 3.5 g/dL 2.4   A-G Ratio Latest Units: g/dL 1.9       Radiology:   MRA on October 16, 2017 at [facility]:  1.  11 mm sized aneurysm from the ophthalmic segment of the right internal carotid artery.  2.  8 x 5 mm sized wide necked aneurysm from the left cavernous segment of the  internal carotid artery.  3.  5 x 3 mm sized aneurysm of the right middle cerebral artery bifurcation.          Current Outpatient Prescriptions   Medication Sig Dispense Refill   • clopidogrel (PLAVIX) 75 MG Tab TAKE 1 TAB BY MOUTH EVERY DAY. 90 Tab 0   • amlodipine (NORVASC) 10 MG Tab TAKE 1 TABLET BY MOUTH EVERY DAY 30 Tab 0   • EPINEPHrine (EPIPEN 2-SHAVON) 0.3 MG/0.3ML Solution Auto-injector solution for injection 0.3 mL by Intramuscular route as needed. 1 Each prn   • atorvastatin (LIPITOR) 40 MG Tab TAKE 1 TAB BY MOUTH EVERY BEDTIME. 90 Tab 1   • paroxetine (PAXIL) 10 MG Tab TAKE 1 TABLET BY MOUTH EVERY DAY WITH FOOD 90 Tab 1   • clobetasol (TEMOVATE) 0.05 % Cream Apply to affected areas twice daily 45 g 1   • Ascorbic Acid (VITAMIN C CR) 1000 MG Tab CR Take  by mouth.     • fluticasone (FLONASE) 50 MCG/ACT nasal spray Spray 2 Sprays in nose every day. 16 g 3   • Cholecalciferol (VITAMIN D3) 2000 UNIT CAPS Take  by mouth.     • Multiple Vitamin (VITAMIN LIQUID PO) Take  by mouth.       No current facility-administered medications for this encounter.        Allergies   Allergen Reactions   • Bee Anaphylaxis   • Codeine Nausea     dizzy   • Keflex Vomiting     No trouble with amoxil   • Lisinopril      cough   • Nsaids Swelling     2 trips to ER w reactions of rash swelling with difficulty breathing.       Physical Exam:  General Appearance: healthy, alert, no distress, cooperative  Skin: negatives: color normal, vascularity normal, temperature normal, positives: scattered small mild healing ecchymoses noted on hands  Eyes: negative findings: conjunctivae and sclerae normal, pupils equal, round, reactive to light and accomodation and gaze conjugate  Lungs: negative findings: normal respiratory rate and rhythm  Extremities: negative findings: color normal bilateral feet, no evidence of discoloration  Musculoskeletal: negative findings: ambulates independently with steady gait  Neurologic: intact    Impression:   1.  Unruptured 11 mm sized aneurysm from the ophthalmic segment of the right internal carotid artery, amenable to coil embolization.  2.  8 x 5 mm sized wide necked aneurysm from the left cavernous segment of the internal carotid artery.  3.  5 x 3 mm sized aneurysm of the right middle cerebral artery bifurcation, amenable to coil embolization.  4. Hypertension, controlled.  5. Peripheral arterial disease, on Plavix and aspirin.  6. Hyperlipidemia.    Plan:   Janes Rivero MD has reviewed 's history and imaging studies, examined the patient, and discussed treatment options.  has 3 unruptured aneurysms. She had an initial consultation for this problem with Rico Curiel MD, who recommended intervention for the aneurysms. Our recommendation is also that she pursue intervention. We explained that we do not do clipping but that neurosurgeons can do that surgery. Her family history, irregularity and 11 mm size of the right ICA aneurysm, and questionable history of intracranial vascular problem in the past that may have been a sentinel headache are compelling reasons for her to seek intervention. The aneurysms are technically amenable to repair. Our plan would be to attempt to treat the right ICA and right MCA aneurysms in the same setting as they appear to have narrow necks, then perform a diagnostic angiogram of the left cavernous ICA aneurysm to determine if it is extra or intracranial. If it is intracranial, it may require a Pipeline Flow Diverter, which we would address in a separate procedure. We discussed the method of the procedure at length including the possibility of the use of stents or balloons to facilitate aneurysm coiling and associated risks of those devices. We explained that intracranial stents are Humanitarian Use Devices and effectiveness of those devices have not been demonstrated and what that means to the patient. We additionally discussed the procedure risks, including  "bleeding and infection, damage to the arteries, reaction to any medications given during the procedure, side effects of contrast, radiation exposure, in stent stenosis, coil or stent migration, mechanical failure, stroke, hemorrhage, and death. The \"white spots\" she noted after the angiogram can be a side effect of contrast injection and she may have that same symptom again. There is a chance the aneurysms may ultimately not be amenable to endovascular intervention. After embolization, there is a chance that the aneurysms could recur. We discussed alternatives of the procedure including surveillance and clipping, which she has already discussed with Dr. Curiel. The patient verbalizes understanding of the plan and elects to proceed. We discussed the need for aspirin and Plavix prior to the coiling procedure and for 6 months post procedure if a stent is placed and would not be able to hold these medications unless there is an emergent need to stop. She is already taking these medications without significant problems. Printed aneurysm education materials including stent information were provided. Written pre- and post- procedure care instructions were provided. We discussed signs of a sentinel headache with instructions to call an ambulance for the onset of a sudden severe headache. She is going to contact our office if she wishes to schedule with our group.       LUIZ Bridges with Janes Rivero MD  Neuro Interventional Service   [place] [id number] [facility] ([id number])  [place] [id number]  [phone]          "

## 2018-03-30 ENCOUNTER — PATIENT OUTREACH (OUTPATIENT)
Dept: HEALTH INFORMATION MANAGEMENT | Facility: OTHER | Age: 68
End: 2018-03-30

## 2018-03-30 NOTE — PROGRESS NOTES
1. Attempt #: FINAL     2. [facility] Verified: yes    3. Verify PCP: yes    4. Care Team Updated:       •   DME Company (gait device, O2, CPAP, etc.): NO       •   Other Specialists (eye doctor, derm, GYN, cardiology, endo, etc): YES    5.  Reviewed/Updated the following with patient:       •   Communication Preference Obtained? YES       •   Preferred Pharmacy? YES       •   Preferred Lab? YES       •   Family History (document living status of immediate family members and if + hx of cancer, diabetes, hypertension, hyperlipidemia, heart attack, stroke) YES. Was Abstract Encounter opened and chart updated? YES    6. [facility] Activation: already active    7. [facility] Desirae: yes    8. Annual Wellness Visit Scheduling  Scheduling Status:Scheduled      9. Care Gap Scheduling (Attempt to Schedule EACH Overdue Care Gap!)     Health Maintenance Due   Topic Date Due   • IMM DTaP/Tdap/Td Vaccine (1 - Tdap) 06/22/1967   • MAMMOGRAM  07/20/2016 WILL SPEAK TO PCP      • Annual Wellness Visit  11/06/2017   • COLON CANCER SCREENING ANNUAL FIT  11/08/2017/  PT HAS FIT TEST AT HOME AND WILL MAIL OUT.         Scheduled patient for Annual Wellness Visit    10. Patient was advised: “This is a free wellness visit. The provider will screen for medical conditions to help you stay healthy. If you have other concerns to address you may be asked to discuss these at a separate visit or there may be an additional fee.”     11. Patient was informed to arrive 15 min prior to their scheduled appointment and bring in their medication bottles.

## 2018-04-20 ENCOUNTER — TELEPHONE (OUTPATIENT)
Dept: RADIOLOGY | Facility: MEDICAL CENTER | Age: 68
End: 2018-04-20

## 2018-04-20 NOTE — TELEPHONE ENCOUNTER
Pt called APRN regarding moving procedure to a later date. Has some family issues that need resolution before the procedure that will not be resolved prior to 4/26. Will r/s to mid May.

## 2018-04-22 DIAGNOSIS — I10 ESSENTIAL HYPERTENSION: ICD-10-CM

## 2018-04-23 RX ORDER — AMLODIPINE BESYLATE 10 MG/1
TABLET ORAL
Qty: 30 TAB | Refills: 0 | Status: SHIPPED | OUTPATIENT
Start: 2018-04-23 | End: 2018-07-14 | Stop reason: SDUPTHER

## 2018-04-25 RX ORDER — ATORVASTATIN CALCIUM 40 MG/1
40 TABLET, FILM COATED ORAL
Qty: 90 TAB | Refills: 1 | Status: SHIPPED | OUTPATIENT
Start: 2018-04-25 | End: 2018-10-18 | Stop reason: SDUPTHER

## 2018-04-25 RX ORDER — PAROXETINE 10 MG/1
TABLET, FILM COATED ORAL
Qty: 90 TAB | Refills: 1 | Status: ON HOLD | OUTPATIENT
Start: 2018-04-25 | End: 2018-05-25

## 2018-05-21 ENCOUNTER — TELEPHONE (OUTPATIENT)
Dept: RADIOLOGY | Facility: MEDICAL CENTER | Age: 68
End: 2018-05-21

## 2018-05-21 NOTE — TELEPHONE ENCOUNTER
Pt contacted APRN, states she is unable to take NSAIDs due to previous allergy. Is taking Plavix w/o problems.     Discussed with pt- do not take aspirin due to allergy. Continue Plavix at same dose.

## 2018-05-23 DIAGNOSIS — Z01.812 PRE-OPERATIVE LABORATORY EXAMINATION: ICD-10-CM

## 2018-05-23 LAB
ANION GAP SERPL CALC-SCNC: 6 MMOL/L (ref 0–11.9)
BASOPHILS # BLD AUTO: 0.6 % (ref 0–1.8)
BASOPHILS # BLD: 0.05 K/UL (ref 0–0.12)
BUN SERPL-MCNC: 10 MG/DL (ref 8–22)
CALCIUM SERPL-MCNC: 9.6 MG/DL (ref 8.5–10.5)
CHLORIDE SERPL-SCNC: 107 MMOL/L (ref 96–112)
CO2 SERPL-SCNC: 27 MMOL/L (ref 20–33)
CREAT SERPL-MCNC: 0.5 MG/DL (ref 0.5–1.4)
EOSINOPHIL # BLD AUTO: 0.11 K/UL (ref 0–0.51)
EOSINOPHIL NFR BLD: 1.2 % (ref 0–6.9)
ERYTHROCYTE [DISTWIDTH] IN BLOOD BY AUTOMATED COUNT: 46.7 FL (ref 35.9–50)
GLUCOSE SERPL-MCNC: 90 MG/DL (ref 65–99)
HCT VFR BLD AUTO: 40.7 % (ref 37–47)
HGB BLD-MCNC: 13.5 G/DL (ref 12–16)
IMM GRANULOCYTES # BLD AUTO: 0.03 K/UL (ref 0–0.11)
IMM GRANULOCYTES NFR BLD AUTO: 0.3 % (ref 0–0.9)
INR PPP: 1 (ref 0.87–1.13)
LYMPHOCYTES # BLD AUTO: 2 K/UL (ref 1–4.8)
LYMPHOCYTES NFR BLD: 22.6 % (ref 22–41)
MCH RBC QN AUTO: 32.4 PG (ref 27–33)
MCHC RBC AUTO-ENTMCNC: 33.2 G/DL (ref 33.6–35)
MCV RBC AUTO: 97.6 FL (ref 81.4–97.8)
MONOCYTES # BLD AUTO: 0.48 K/UL (ref 0–0.85)
MONOCYTES NFR BLD AUTO: 5.4 % (ref 0–13.4)
NEUTROPHILS # BLD AUTO: 6.17 K/UL (ref 2–7.15)
NEUTROPHILS NFR BLD: 69.9 % (ref 44–72)
NRBC # BLD AUTO: 0 K/UL
NRBC BLD-RTO: 0 /100 WBC
PLATELET # BLD AUTO: 285 K/UL (ref 164–446)
PMV BLD AUTO: 9.6 FL (ref 9–12.9)
POTASSIUM SERPL-SCNC: 3.9 MMOL/L (ref 3.6–5.5)
PROTHROMBIN TIME: 12.9 SEC (ref 12–14.6)
RBC # BLD AUTO: 4.17 M/UL (ref 4.2–5.4)
SODIUM SERPL-SCNC: 140 MMOL/L (ref 135–145)
WBC # BLD AUTO: 8.8 K/UL (ref 4.8–10.8)

## 2018-05-23 PROCEDURE — 85610 PROTHROMBIN TIME: CPT

## 2018-05-23 PROCEDURE — 80048 BASIC METABOLIC PNL TOTAL CA: CPT

## 2018-05-23 PROCEDURE — 85025 COMPLETE CBC W/AUTO DIFF WBC: CPT

## 2018-05-23 PROCEDURE — 36415 COLL VENOUS BLD VENIPUNCTURE: CPT

## 2018-05-25 ENCOUNTER — APPOINTMENT (OUTPATIENT)
Dept: RADIOLOGY | Facility: MEDICAL CENTER | Age: 68
DRG: 026 | End: 2018-05-25
Attending: SURGERY
Payer: MEDICARE

## 2018-05-25 ENCOUNTER — HOSPITAL ENCOUNTER (INPATIENT)
Facility: MEDICAL CENTER | Age: 68
LOS: 1 days | DRG: 026 | End: 2018-05-26
Attending: RADIOLOGY | Admitting: RADIOLOGY
Payer: MEDICARE

## 2018-05-25 ENCOUNTER — APPOINTMENT (OUTPATIENT)
Dept: RADIOLOGY | Facility: MEDICAL CENTER | Age: 68
DRG: 026 | End: 2018-05-25
Attending: RADIOLOGY
Payer: MEDICARE

## 2018-05-25 DIAGNOSIS — I67.1 INTRACRANIAL ANEURYSM: ICD-10-CM

## 2018-05-25 PROBLEM — Z72.0 TOBACCO ABUSE: Status: ACTIVE | Noted: 2018-05-25

## 2018-05-25 LAB
ACT BLD: 235 SEC (ref 74–137)
ACT BLD: 246 SEC (ref 74–137)
EKG IMPRESSION: NORMAL

## 2018-05-25 PROCEDURE — 36228 PLACE CATH INTRACRANIAL ART: CPT

## 2018-05-25 PROCEDURE — B3161ZZ FLUOROSCOPY OF RIGHT INTERNAL CAROTID ARTERY USING LOW OSMOLAR CONTRAST: ICD-10-PCS | Performed by: RADIOLOGY

## 2018-05-25 PROCEDURE — B31R1ZZ FLUOROSCOPY OF INTRACRANIAL ARTERIES USING LOW OSMOLAR CONTRAST: ICD-10-PCS | Performed by: RADIOLOGY

## 2018-05-25 PROCEDURE — 700111 HCHG RX REV CODE 636 W/ 250 OVERRIDE (IP): Performed by: HOSPITALIST

## 2018-05-25 PROCEDURE — 700101 HCHG RX REV CODE 250

## 2018-05-25 PROCEDURE — B3131ZZ FLUOROSCOPY OF RIGHT COMMON CAROTID ARTERY USING LOW OSMOLAR CONTRAST: ICD-10-PCS | Performed by: RADIOLOGY

## 2018-05-25 PROCEDURE — 160009 HCHG ANES TIME/MIN: Performed by: SURGERY

## 2018-05-25 PROCEDURE — C1768 GRAFT, VASCULAR: HCPCS | Performed by: SURGERY

## 2018-05-25 PROCEDURE — 04CK0ZZ EXTIRPATION OF MATTER FROM RIGHT FEMORAL ARTERY, OPEN APPROACH: ICD-10-PCS | Performed by: SURGERY

## 2018-05-25 PROCEDURE — 93010 ELECTROCARDIOGRAM REPORT: CPT | Performed by: INTERNAL MEDICINE

## 2018-05-25 PROCEDURE — B3171ZZ FLUOROSCOPY OF LEFT INTERNAL CAROTID ARTERY USING LOW OSMOLAR CONTRAST: ICD-10-PCS | Performed by: RADIOLOGY

## 2018-05-25 PROCEDURE — 160002 HCHG RECOVERY MINUTES (STAT)

## 2018-05-25 PROCEDURE — 99223 1ST HOSP IP/OBS HIGH 75: CPT | Performed by: HOSPITALIST

## 2018-05-25 PROCEDURE — 160028 HCHG SURGERY MINUTES - 1ST 30 MINS LEVEL 3: Performed by: SURGERY

## 2018-05-25 PROCEDURE — 93926 LOWER EXTREMITY STUDY: CPT

## 2018-05-25 PROCEDURE — 85347 COAGULATION TIME ACTIVATED: CPT

## 2018-05-25 PROCEDURE — 500700 HCHG HEMOCLIP, SMALL (RED): Performed by: SURGERY

## 2018-05-25 PROCEDURE — 700111 HCHG RX REV CODE 636 W/ 250 OVERRIDE (IP)

## 2018-05-25 PROCEDURE — 75898 FOLLOW-UP ANGIOGRAPHY: CPT

## 2018-05-25 PROCEDURE — A6402 STERILE GAUZE <= 16 SQ IN: HCPCS | Performed by: SURGERY

## 2018-05-25 PROCEDURE — A9270 NON-COVERED ITEM OR SERVICE: HCPCS | Performed by: HOSPITALIST

## 2018-05-25 PROCEDURE — 500698 HCHG HEMOCLIP, MEDIUM: Performed by: SURGERY

## 2018-05-25 PROCEDURE — 700102 HCHG RX REV CODE 250 W/ 637 OVERRIDE(OP): Performed by: HOSPITALIST

## 2018-05-25 PROCEDURE — 4410465 IR-EMBOLIZE-NEURO-INTRACRANIAL

## 2018-05-25 PROCEDURE — 501838 HCHG SUTURE GENERAL: Performed by: SURGERY

## 2018-05-25 PROCEDURE — 03VG3DZ RESTRICTION OF INTRACRANIAL ARTERY WITH INTRALUMINAL DEVICE, PERCUTANEOUS APPROACH: ICD-10-PCS | Performed by: RADIOLOGY

## 2018-05-25 PROCEDURE — 160039 HCHG SURGERY MINUTES - EA ADDL 1 MIN LEVEL 3: Performed by: SURGERY

## 2018-05-25 PROCEDURE — 700102 HCHG RX REV CODE 250 W/ 637 OVERRIDE(OP): Performed by: INTERNAL MEDICINE

## 2018-05-25 PROCEDURE — 76376 3D RENDER W/INTRP POSTPROCES: CPT

## 2018-05-25 PROCEDURE — C1757 CATH, THROMBECTOMY/EMBOLECT: HCPCS | Performed by: SURGERY

## 2018-05-25 PROCEDURE — 160048 HCHG OR STATISTICAL LEVEL 1-5: Performed by: SURGERY

## 2018-05-25 PROCEDURE — 770022 HCHG ROOM/CARE - ICU (200)

## 2018-05-25 PROCEDURE — 04UK0JZ SUPPLEMENT RIGHT FEMORAL ARTERY WITH SYNTHETIC SUBSTITUTE, OPEN APPROACH: ICD-10-PCS | Performed by: SURGERY

## 2018-05-25 PROCEDURE — 75894 X-RAYS TRANSCATH THERAPY: CPT

## 2018-05-25 PROCEDURE — 501837 HCHG SUTURE CV: Performed by: SURGERY

## 2018-05-25 PROCEDURE — 93005 ELECTROCARDIOGRAM TRACING: CPT | Performed by: RADIOLOGY

## 2018-05-25 PROCEDURE — A9270 NON-COVERED ITEM OR SERVICE: HCPCS | Performed by: INTERNAL MEDICINE

## 2018-05-25 PROCEDURE — 160002 HCHG RECOVERY MINUTES (STAT): Performed by: SURGERY

## 2018-05-25 PROCEDURE — 160035 HCHG PACU - 1ST 60 MINS PHASE I: Performed by: SURGERY

## 2018-05-25 DEVICE — GRAFT IMPRA CAROTID 14X75MM: Type: IMPLANTABLE DEVICE | Site: GROIN | Status: FUNCTIONAL

## 2018-05-25 RX ORDER — AMOXICILLIN 250 MG
2 CAPSULE ORAL 2 TIMES DAILY
Status: DISCONTINUED | OUTPATIENT
Start: 2018-05-25 | End: 2018-05-26 | Stop reason: HOSPADM

## 2018-05-25 RX ORDER — LABETALOL HYDROCHLORIDE 5 MG/ML
10 INJECTION, SOLUTION INTRAVENOUS EVERY 4 HOURS PRN
Status: DISCONTINUED | OUTPATIENT
Start: 2018-05-25 | End: 2018-05-26 | Stop reason: HOSPADM

## 2018-05-25 RX ORDER — BISACODYL 10 MG
10 SUPPOSITORY, RECTAL RECTAL
Status: DISCONTINUED | OUTPATIENT
Start: 2018-05-25 | End: 2018-05-26 | Stop reason: HOSPADM

## 2018-05-25 RX ORDER — ONDANSETRON 2 MG/ML
INJECTION INTRAMUSCULAR; INTRAVENOUS
Status: COMPLETED
Start: 2018-05-25 | End: 2018-05-25

## 2018-05-25 RX ORDER — CLOPIDOGREL BISULFATE 75 MG/1
75 TABLET ORAL DAILY
Status: DISCONTINUED | OUTPATIENT
Start: 2018-05-26 | End: 2018-05-25

## 2018-05-25 RX ORDER — CLOPIDOGREL BISULFATE 75 MG/1
75 TABLET ORAL
Status: DISCONTINUED | OUTPATIENT
Start: 2018-05-26 | End: 2018-05-26 | Stop reason: HOSPADM

## 2018-05-25 RX ORDER — SCOLOPAMINE TRANSDERMAL SYSTEM 1 MG/1
1 PATCH, EXTENDED RELEASE TRANSDERMAL
Status: DISCONTINUED | OUTPATIENT
Start: 2018-05-25 | End: 2018-05-26 | Stop reason: HOSPADM

## 2018-05-25 RX ORDER — HEPARIN SODIUM,PORCINE 10 UNIT/ML
VIAL (ML) INTRAVENOUS
Status: COMPLETED
Start: 2018-05-25 | End: 2018-05-25

## 2018-05-25 RX ORDER — ATORVASTATIN CALCIUM 40 MG/1
40 TABLET, FILM COATED ORAL
Status: DISCONTINUED | OUTPATIENT
Start: 2018-05-25 | End: 2018-05-26 | Stop reason: HOSPADM

## 2018-05-25 RX ORDER — BUPIVACAINE HYDROCHLORIDE AND EPINEPHRINE 5; 5 MG/ML; UG/ML
INJECTION, SOLUTION EPIDURAL; INTRACAUDAL; PERINEURAL
Status: DISCONTINUED | OUTPATIENT
Start: 2018-05-25 | End: 2018-05-25 | Stop reason: HOSPADM

## 2018-05-25 RX ORDER — HEPARIN SODIUM,PORCINE 1000/ML
VIAL (ML) INJECTION
Status: DISPENSED
Start: 2018-05-25 | End: 2018-05-25

## 2018-05-25 RX ORDER — LIDOCAINE HYDROCHLORIDE 10 MG/ML
0.5 INJECTION, SOLUTION INFILTRATION; PERINEURAL
Status: ACTIVE | OUTPATIENT
Start: 2018-05-25 | End: 2018-05-26

## 2018-05-25 RX ORDER — ONDANSETRON 2 MG/ML
4 INJECTION INTRAMUSCULAR; INTRAVENOUS EVERY 4 HOURS PRN
Status: DISCONTINUED | OUTPATIENT
Start: 2018-05-25 | End: 2018-05-26 | Stop reason: HOSPADM

## 2018-05-25 RX ORDER — POLYETHYLENE GLYCOL 3350 17 G/17G
1 POWDER, FOR SOLUTION ORAL
Status: DISCONTINUED | OUTPATIENT
Start: 2018-05-25 | End: 2018-05-26 | Stop reason: HOSPADM

## 2018-05-25 RX ORDER — CLINDAMYCIN PHOSPHATE 150 MG/ML
INJECTION, SOLUTION INTRAVENOUS
Status: DISPENSED
Start: 2018-05-25 | End: 2018-05-25

## 2018-05-25 RX ORDER — ASCORBIC ACID 500 MG
500 TABLET ORAL 2 TIMES DAILY
Status: DISCONTINUED | OUTPATIENT
Start: 2018-05-25 | End: 2018-05-26 | Stop reason: HOSPADM

## 2018-05-25 RX ORDER — ONDANSETRON 4 MG/1
4 TABLET, ORALLY DISINTEGRATING ORAL EVERY 4 HOURS PRN
Status: DISCONTINUED | OUTPATIENT
Start: 2018-05-25 | End: 2018-05-26 | Stop reason: HOSPADM

## 2018-05-25 RX ORDER — AMLODIPINE BESYLATE 10 MG/1
10 TABLET ORAL
Status: DISCONTINUED | OUTPATIENT
Start: 2018-05-25 | End: 2018-05-26 | Stop reason: HOSPADM

## 2018-05-25 RX ADMIN — ONDANSETRON 4 MG: 2 INJECTION INTRAMUSCULAR; INTRAVENOUS at 17:03

## 2018-05-25 RX ADMIN — ATORVASTATIN CALCIUM 40 MG: 40 TABLET, FILM COATED ORAL at 21:36

## 2018-05-25 RX ADMIN — OXYCODONE HYDROCHLORIDE AND ACETAMINOPHEN 500 MG: 500 TABLET ORAL at 21:36

## 2018-05-25 RX ADMIN — ONDANSETRON 4 MG: 2 INJECTION INTRAMUSCULAR; INTRAVENOUS at 21:36

## 2018-05-25 RX ADMIN — FENTANYL CITRATE 25 MCG: 50 INJECTION, SOLUTION INTRAMUSCULAR; INTRAVENOUS at 12:45

## 2018-05-25 RX ADMIN — ONDANSETRON 4 MG: 2 INJECTION INTRAMUSCULAR; INTRAVENOUS at 13:00

## 2018-05-25 RX ADMIN — AMLODIPINE BESYLATE 10 MG: 10 TABLET ORAL at 21:36

## 2018-05-25 RX ADMIN — SCOPALAMINE 1 PATCH: 1 PATCH, EXTENDED RELEASE TRANSDERMAL at 17:51

## 2018-05-25 RX ADMIN — FENTANYL CITRATE 50 MCG: 50 INJECTION, SOLUTION INTRAMUSCULAR; INTRAVENOUS at 13:14

## 2018-05-25 RX ADMIN — FENTANYL CITRATE 25 MCG: 50 INJECTION, SOLUTION INTRAMUSCULAR; INTRAVENOUS at 12:30

## 2018-05-25 ASSESSMENT — ENCOUNTER SYMPTOMS
NERVOUS/ANXIOUS: 0
HEADACHES: 0
SENSORY CHANGE: 0
ABDOMINAL PAIN: 0
BLURRED VISION: 0
FOCAL WEAKNESS: 0
WEAKNESS: 0
PSYCHIATRIC NEGATIVE: 1
NAUSEA: 1
CHILLS: 0
EYES NEGATIVE: 1
SHORTNESS OF BREATH: 0
DIZZINESS: 0
FEVER: 0
PALPITATIONS: 0
VOMITING: 0
MYALGIAS: 1
NAUSEA: 0
DOUBLE VISION: 0
SORE THROAT: 0
COUGH: 0
BACK PAIN: 0
SPEECH CHANGE: 0
SEIZURES: 0
DIARRHEA: 0
DEPRESSION: 0

## 2018-05-25 ASSESSMENT — LIFESTYLE VARIABLES
ON A TYPICAL DAY WHEN YOU DRINK ALCOHOL HOW MANY DRINKS DO YOU HAVE: 0
EVER FELT BAD OR GUILTY ABOUT YOUR DRINKING: NO
AVERAGE NUMBER OF DAYS PER WEEK YOU HAVE A DRINK CONTAINING ALCOHOL: 2
HAVE YOU EVER FELT YOU SHOULD CUT DOWN ON YOUR DRINKING: NO
ALCOHOL_USE: YES
EVER HAD A DRINK FIRST THING IN THE MORNING TO STEADY YOUR NERVES TO GET RID OF A HANGOVER: NO
EVER_SMOKED: YES
TOTAL SCORE: 0
TOTAL SCORE: 0
HAVE PEOPLE ANNOYED YOU BY CRITICIZING YOUR DRINKING: NO
EVER_SMOKED: YES
HOW MANY TIMES IN THE PAST YEAR HAVE YOU HAD 5 OR MORE DRINKS IN A DAY: 4
CONSUMPTION TOTAL: POSITIVE
TOTAL SCORE: 0

## 2018-05-25 ASSESSMENT — COPD QUESTIONNAIRES
HAVE YOU SMOKED AT LEAST 100 CIGARETTES IN YOUR ENTIRE LIFE: YES
COPD SCREENING SCORE: 5
DO YOU EVER COUGH UP ANY MUCUS OR PHLEGM?: YES, A FEW DAYS A WEEK OR MONTH
DURING THE PAST 4 WEEKS HOW MUCH DID YOU FEEL SHORT OF BREATH: NONE/LITTLE OF THE TIME
IN THE PAST 12 MONTHS DO YOU DO LESS THAN YOU USED TO BECAUSE OF YOUR BREATHING PROBLEMS: DISAGREE/UNSURE

## 2018-05-25 ASSESSMENT — PAIN SCALES - GENERAL
PAINLEVEL_OUTOF10: 0
PAINLEVEL_OUTOF10: 2
PAINLEVEL_OUTOF10: 0

## 2018-05-25 NOTE — OR SURGEON
Immediate Post- Operative Note        PostOp Diagnosis: Right ICA and Mca Aneurysms.    Procedure(s):     Endovascualr repair of right ICA aneurym  Stent assited endovascualr repair of right MCA aneurym      Estimated Blood Loss: Less than 5 ml        Complications: None            5/25/2018     11:38 AM     Janes Rivero

## 2018-05-25 NOTE — OR NURSING
1530 Patient to PACU in stable condition. Denies pain or nausea. Pulses are present and right leg is warm. Patient denies nausea or pain.     1400 Patient doing well. Awake and alert. Ready for transport to room. Denies pain or nausea. Tolerating po well.

## 2018-05-25 NOTE — OR NURSING
Patient arrived to PACU and is complaining of aching to her right leg. Doppler pulses were not detected. Right foot is warm. Dr Rivero has been paged. Spoke with Lorenza RN who will transfer message to MD    1152 Dr Tracy and bedside and we assessed right leg with doppler. Per MD he will call Dr Oneal for consult.     1215 Vascular ultrasound completed. Dr Tracy phoned as patient has a blocked artery. Dr Durbin at bedside and plan is to take her back for removal of device. Pt is teary eyed and having pain. I have medicated her with IV fentanyl and she is resting.    1300 Dr Durbin brought family back and consents signed. Pt is doing well. Medicated for nausea. Awaiting surgery.

## 2018-05-25 NOTE — CONSULTS
Wound Consult Note:    Carina Durbin  Date & Time note created:    5/25/2018   12:43 PM     Referred by: Dr. Rivero    Patient ID:   Name:             Reina Gandara   YOB: 1950  Age:                 67 y.o.  female   MRN:               [id number]                                                             Reason for Consult:      Right femoral artery occlusion    History of Present Illness:    *Reina Gandara is a 67-year-old woman who presents today for endovascular repair of right internal carotid artery aneurysm and didn't assisted repair of right middle cerebral artery aneurysm. She had a star close device deployed to close her right femoral artery and in the recovery area, she began complaining of right leg pain and numbness. Noninvasive study was performed and demonstrates retrograde flow in the superficial femoral artery, below the closure site. I can feel*close device which appears to be embedded and posterior wall plaque.    Review of Systems:      Constitutional: Denies fevers, denies weight changes  Eyes: Denies changes in vision, no eye pain  Ears/Nose/Throat/Mouth: Denies nasal congestion or sore throat   Cardiovascular: Denies chest pain or  palpitations   Respiratory: Denies shortness of breath , Denies cough  Gastrointestinal/Hepatic: Denies abdominal pain, nausea, vomiting, diarrhea, constipation or GI bleeding   Genitourinary: Denies dysuria or frequency  Musculoskeletal/Rheum: Denies  joint pain and swelling, no edema, right leg pain at rest  Skin: No rash, no history of open wounds  Neurological: Denies headache, confusion, memory loss or focal weakness/parasthesias  Psychiatric: Denies mood disorder   Endocrine: Denies thyroid problems  Heme/Oncology/Lymph Nodes: Denies enlarged lymph nodes, denies brusing or known bleeding disorder  All other systems were reviewed and are negative (AMA/CMS criteria)                Past Medical History:   Past Medical  History:   Diagnosis Date   • Contact dermatitis due to cosmetics 9/16/2015   • Environmental and seasonal allergies    • Guillain Barré syndrome (HCC)     Hx    • Hemorrhagic disorder (HCC)     plavix   • History of kidney stones     x2 episodes   • History of seizures     x2 episodes - no treatment needed   • Hyperlipidemia    • Hypertension    • Intracranial aneurysm    • PAD (peripheral artery disease) 5/7/2014   • Psychiatric problem     depression   • Reactive depression 9/17/2013   • Renal disorder     stones   • Seizure (HCC) 1988    unknown cause       Past Surgical History:  Past Surgical History:   Procedure Laterality Date   • RECOVERY  10/4/2013    Performed by [facility] at SURGERY SAME DAY [facility]   • FEMORAL POPLITEAL BYPASS  7/7/2013    Performed by Carina Durbin M.D. at SURGERY [facility] ORS   • RECOVERY  7/6/2013    Performed by [facility] at SURGERY [facility]   • ABDOMINAL HYSTERECTOMY TOTAL  1981    w/o BSO due to fibroids   • APPENDECTOMY      during hysterectomy   • OTHER      neuroma removed from feet 4-5x   • OTHER      abdominal stent   • TONSILLECTOMY     • WRIST ORIF         Current Outpatient Medications:  Current Facility-Administered Medications   Medication Dose Route Frequency Provider Last Rate Last Dose   • lidocaine (XYLOCAINE) 1%  injection  0.5 mL Intradermal Once PRN Janes Rivero M.D.       • CLINDAMYCIN PHOSPHATE 900 MG/6ML INJ SOLN            • FENTANYL CITRATE (PF) 0.05 MG/ML INJ SOLN            • HEPARIN 1000 UNITS/ML OR USE ONLY            • HEPARIN 1000 UNITS/ML OR USE ONLY            • SUGAMMADEX SODIUM 200 MG/2ML IV SOLN            • amLODIPine (NORVASC) tablet 10 mg  10 mg Oral QDAY RASHI Mariee.O.       • ascorbic acid tablet 500 mg  500 mg Oral BID RASHI Mariee.O.       • atorvastatin (LIPITOR) tablet 40 mg  40 mg Oral QHS RASHI Mariee.O.       • vitamin D (cholecalciferol) tablet 2,000 Units  2,000 Units Oral  DAILY Shemar Lynch D.O.       • [START ON 5/26/2018] clopidogrel (PLAVIX) tablet 75 mg  75 mg Oral QDAY Shemar Lynch D.O.       • senna-docusate (PERICOLACE or SENOKOT S) 8.6-50 MG per tablet 2 Tab  2 Tab Oral BID Shemar Lynch D.O.        And   • polyethylene glycol/lytes (MIRALAX) PACKET 1 Packet  1 Packet Oral QDAY PRN Shemar Lynch D.O.        And   • magnesium hydroxide (MILK OF MAGNESIA) suspension 30 mL  30 mL Oral QDAY PRN Shemar Lynch D.O.        And   • bisacodyl (DULCOLAX) suppository 10 mg  10 mg Rectal QDAY PRN Shemar Lynch D.O.       • labetalol (NORMODYNE,TRANDATE) injection 10 mg  10 mg Intravenous Q4HRS PRN Shemar Lynch D.O.       • ondansetron (ZOFRAN) syringe/vial injection 4 mg  4 mg Intravenous Q4HRS PRN Shemar Lynch D.O.       • ondansetron (ZOFRAN ODT) dispertab 4 mg  4 mg Oral Q4HRS PRN Shemar Lynch D.O.           Medication Allergy:  Allergies   Allergen Reactions   • Bee Anaphylaxis   • Nsaids Swelling     2 trips to ER w reactions of rash swelling with difficulty breathing.   • Codeine Nausea     dizzy   • Keflex Vomiting     No trouble with amoxil       Family History:  Family History   Problem Relation Age of Onset   • Other Sister      tremors / instability   • Lung Disease Sister      COPD    • Hyperlipidemia Sister    • Other Paternal Grandfather      brain aneurysm       Social History:  Social History     Social History   • Marital status:      Spouse name: N/A   • Number of children: N/A   • Years of education: N/A     Occupational History   •  [employer]     Social History Main Topics   • Smoking status: Current Some Day Smoker     Packs/day: 0.25     Types: Cigarettes   • Smokeless tobacco: Never Used      Comment: 1/2 ppd down to 4 cig/day since 4/13, down to 2 cig/day since 1014   • Alcohol use 0.6 oz/week     1 Glasses of wine per week      Comment: 3/week   • Drug use: No   • Sexual activity: Not Currently      "Partners: Male      Comment: one son; ;  wk: insurance     Other Topics Concern   • Not on file     Social History Narrative   • No narrative on file         Physical Exam:  Vitals/ General Appearance:   Weight/BMI: Body mass index is 17.37 kg/m².  Blood pressure 125/62, pulse 76, temperature 38 °C (100.4 °F), resp. rate (!) 10, height 1.549 m (5' 1\"), weight 41.7 kg (91 lb 14.9 oz), SpO2 97 %, not currently breastfeeding.  Vitals:    18 1145 18 1200 18 1215 18 1230   BP:       Pulse: 81 80 77 76   Resp: 15 15 15 (!) 10   Temp:       SpO2: 95% 96% 97% 97%   Weight:       Height:         Oxygen Therapy:  Pulse Oximetry: 97 %, O2 (LPM): 2, O2 Delivery: Nasal Cannula    Constitutional:   Well developed, thin,  Well nourished, No acute distress  HENMT:  Normocephalic, Atraumatic, Oropharynx moist mucous membranes.  Eyes:  EOMI, Conjunctiva normal, No discharge.  Neck: no JVD.  Cardiovascular:  Normal heart rate, Normal rhythm, No murmur.    Left lower extremities with intact distal pulses, right groin is tender and there is an absent right femoral pulse and no tibial doppler signals  Lungs:  Breath sounds clear to auscultation bilaterally.   Abdomen: Bowel sounds normal, Soft, No tenderness.   Skin: Warm, Dry, No erythema, No rash, no induration.  Neurologic: Alert & oriented x 3, No focal deficits noted, cranial nerves II through X are grossly intact.  Psychiatric: Affect normal, Judgment normal, tearful    Lab Data Review:  Recent Results (from the past 24 hour(s))   EKG    Collection Time: 18  6:55 AM   Result Value Ref Range    Report       [facility]    Test Date:  2018  Pt Name:    VIKAS CHRISTINE            Department: [facility]  MRN:        [id number]                      Room:       [facility]  Gender:     Female                       Technician: [facility]  :        [id number]                   Requested By:SIERRA FOFANA  Order #:    [id number]                    " Reading MD:    Measurements  Intervals                                Axis  Rate:       75                           P:          79  KS:         144                          QRS:        71  QRSD:       68                           T:          63  QT:         380  QTc:        425    Interpretive Statements  SINUS RHYTHM  BORDERLINE R WAVE PROGRESSION, ANTERIOR LEADS  Compared to ECG 07/06/2013 18:50:57  Sinus arrhythmia no longer present  T-wave abnormality no longer present  Possible ischemia no longer present     POC ACT (resulted by nursing)    Collection Time: 05/25/18  9:01 AM   Result Value Ref Range    Istat Activated Clotting Time 246 (H) 74 - 137 sec   POC ACT (resulted by nursing)    Collection Time: 05/25/18 10:17 AM   Result Value Ref Range    Istat Activated Clotting Time 235 (H) 74 - 137 sec       Imaging/Procedures Review:    Right femoral images reviewed and retrograde flow in the right SFA is apparent with a small artery and plaque causing complete occlusion.     MDM (Assessment and Plan):     Patient Active Problem List    Diagnosis Date Noted   • Reactive depression 09/17/2013     Priority: High   • Aneurysm (HCC) 01/16/2018   • Irritant contact dermatitis due to other agents 01/16/2018   • Abnormal TSH 01/16/2018   • Essential hypertension 08/25/2016   • PAD (peripheral artery disease) (HCC) 01/25/2016   • Contact dermatitis due to cosmetics 09/16/2015   • H/O bee sting allergy 08/26/2014   • Dyslipidemia 08/11/2014   • Hx of Guillain-Franklin syndrome 09/17/2013   • HTN (hypertension)    • History of seizures    • History of kidney stones      Impression/Plan:   I think the most expeditious way to fix the right femoral occlusion is operative. Right femoral exploration with removal of the [place] was devised and possibly, femoral endarterectomy is planned to remove the functional occlusion caused by the placement of the [place] was devised. Explained this to the patient as well as the associated risks  and benefits which include bleeding, infection, reoperation and nerve injury. She appears to understand and we will proceed.    Carina Durbin M.D.

## 2018-05-25 NOTE — PROGRESS NOTES
IR Procedure Note:    Site Marked and Confirmed with imaging by MD, RT, patient and RN pre procedure.    Dr. Rivero performed a cerebral angiogram with coil embolization of right ICA aneurysm and stenting and coil embolization of right MCA aneurysm. General anesthesia by Dr. Ahn. All vital signs monitoring and medication administration by anesthesia services - See anesthesia record.      StarClose, tegaderm and guaze applied to right groin, CDI and soft; pressure held x 5 minutes.  Pt alert, oriented and verbally appropriate post procedure, vital signs stable during procedure and transport, see flow sheet for vital signs.  Report given to MINDY Sharp.  RN transported pt to [facility] with Sao2 monitor = 98% on oxygen via NC @ 4 lpm.      0809 - Anesthesia start time  0853 - Heparin: 5,000 units  0858 - arterial ACT = 246  1005 - Heparin: 1,000 units  1010 - arterial ACT = 235    Right ICA Aneurysm Coil Embolization:  1. Terumo Microvention [id number] Endovascular Embolization Coil, Framing Coil, 9mm x 31 cm, REF# [id number], LOT# [id number]   2. Terumo Microvention Rsaviqyiwx19 Endovascular Embolization Coil, Framing Coil, 7mm x 28 cm, REF# [id number], LOT# [id number]  3. Terumo Microvention Donvpmotzl01 Endovascular Embolization Coil, Framing Coil, 5mm x 20 cm, REF# [id number], LOT# [id number]  4. Terumo Microvention Ktcxqokff76 Endovascular Embolization Coil, Hydrosoft 3D, 3mm x 8 cm, REF# [id number], LOT# [id number]  5. Terumo Microvention Uklodiffh88 Endovascular Embolization Coil, Hydrosoft 3D, 1.5mm x 4 cm, REF# [phone], LOT# [id number]     Right MCA Aneurysm Stent:  1. Terumo Microvention LVIS Jr Intra luminal Support Device, 2.5 mm x 17 mm x 13 mm, REF# [id number], LOT# [id number]    Right MCA Aneurysm Coil Embolization:  1. Terumo Microvention Rvndijkxq73 Endovascular Embolization Coil, Hydrosoft 3D, 3mm x 6 cm, REF# [phone], LOT# [id number]    Procedure End Time: 1058

## 2018-05-26 VITALS
HEIGHT: 61 IN | TEMPERATURE: 99.7 F | RESPIRATION RATE: 20 BRPM | OXYGEN SATURATION: 93 % | HEART RATE: 83 BPM | DIASTOLIC BLOOD PRESSURE: 62 MMHG | SYSTOLIC BLOOD PRESSURE: 125 MMHG | BODY MASS INDEX: 17.36 KG/M2 | WEIGHT: 91.93 LBS

## 2018-05-26 PROBLEM — I70.201 FEMORAL ARTERY OCCLUSION, RIGHT (HCC): Status: ACTIVE | Noted: 2018-05-26

## 2018-05-26 PROBLEM — I67.1 ANEURYSM OF MIDDLE CEREBRAL ARTERY: Status: ACTIVE | Noted: 2018-05-26

## 2018-05-26 PROBLEM — I67.1 ANEURYSM OF INTERNAL CAROTID ARTERY: Status: ACTIVE | Noted: 2018-05-26

## 2018-05-26 PROCEDURE — 99239 HOSP IP/OBS DSCHRG MGMT >30: CPT | Performed by: HOSPITALIST

## 2018-05-26 PROCEDURE — A9270 NON-COVERED ITEM OR SERVICE: HCPCS | Performed by: HOSPITALIST

## 2018-05-26 PROCEDURE — 700102 HCHG RX REV CODE 250 W/ 637 OVERRIDE(OP): Performed by: HOSPITALIST

## 2018-05-26 RX ADMIN — VITAMIN D, TAB 1000IU (100/BT) 2000 UNITS: 25 TAB at 08:32

## 2018-05-26 RX ADMIN — OXYCODONE HYDROCHLORIDE AND ACETAMINOPHEN 500 MG: 500 TABLET ORAL at 08:32

## 2018-05-26 RX ADMIN — CLOPIDOGREL 75 MG: 75 TABLET, FILM COATED ORAL at 08:32

## 2018-05-26 ASSESSMENT — PATIENT HEALTH QUESTIONNAIRE - PHQ9
SUM OF ALL RESPONSES TO PHQ9 QUESTIONS 1 AND 2: 0
1. LITTLE INTEREST OR PLEASURE IN DOING THINGS: NOT AT ALL
2. FEELING DOWN, DEPRESSED, IRRITABLE, OR HOPELESS: NOT AT ALL

## 2018-05-26 ASSESSMENT — PAIN SCALES - GENERAL
PAINLEVEL_OUTOF10: 0

## 2018-05-26 NOTE — CONSULTS
Critical Care/Pulmonary Consultation    Date of service: 5/25/2018    Consulting Physician: Janes Rivero M.D.    Chief Complaint: R ICA aneurysm     History of Present Illness: Reina Gandara is a 67 y.o. female with a past medical history of hypertension, hyperlipidemia, tobacco abuse who while undergoing dizziness workup was found to have an ICA aneurysm approximately 9 months ago.  She was brought into the hospital today for a scheduled elective endovascular repair of the aneurysm which was performed by Dr. Tracy.  In the recovery room patient started to develop severe right lower extremity pain and numbness and was found to have retrograde flow in the superficial femoral artery below the closure site.  Dr. Durbin with vascular surgery was consulted and took the patient to the operating room again this time for right femoral artery exploration with removal of the [place] closure device a patient was brought to the intensive care unit postoperativelynd endarterectomy.  And I was consulted for assistance in her critical care management.    Review of Systems   Constitutional: Negative for chills and fever.   HENT: Negative for congestion and sore throat.    Eyes: Negative.    Respiratory: Negative for cough and shortness of breath.    Cardiovascular: Negative for chest pain and palpitations.   Gastrointestinal: Positive for nausea. Negative for abdominal pain and vomiting.   Genitourinary: Negative.    Musculoskeletal: Positive for myalgias. Negative for back pain and joint pain.   Skin: Negative.    Neurological: Negative for dizziness, sensory change, focal weakness, seizures, weakness and headaches.   Endo/Heme/Allergies: Negative.    Psychiatric/Behavioral: Negative.    All other systems reviewed and are negative.      No current facility-administered medications on file prior to encounter.      Current Outpatient Prescriptions on File Prior to Encounter   Medication Sig Dispense Refill   •  atorvastatin (LIPITOR) 40 MG Tab Take 1 Tab by mouth every bedtime. 90 Tab 1   • amLODIPine (NORVASC) 10 MG Tab TAKE 1 TABLET BY MOUTH EVERY DAY 30 Tab 0   • clopidogrel (PLAVIX) 75 MG Tab TAKE 1 TAB BY MOUTH EVERY DAY. 90 Tab 0   • Ascorbic Acid (VITAMIN C CR) 1000 MG Tab CR Take 1 Tab by mouth every day.     • Cholecalciferol (VITAMIN D3) 2000 UNIT CAPS Take 1 Cap by mouth every day.     • Multiple Vitamin (VITAMIN LIQUID PO) Take 30 mL by mouth every day.         Social History   Substance Use Topics   • Smoking status: Current Some Day Smoker     Packs/day: 0.25     Types: Cigarettes   • Smokeless tobacco: Never Used      Comment: 1/2 ppd down to 4 cig/day since 4/13, down to 2 cig/day since 1014   • Alcohol use 0.6 oz/week     1 Glasses of wine per week      Comment: 3/week        Past Medical History:   Diagnosis Date   • Contact dermatitis due to cosmetics 9/16/2015   • Environmental and seasonal allergies    • Guillain Barré syndrome (HCC)     Hx    • Hemorrhagic disorder (HCC)     plavix   • History of kidney stones     x2 episodes   • History of seizures     x2 episodes - no treatment needed   • Hyperlipidemia    • Hypertension    • Intracranial aneurysm    • PAD (peripheral artery disease) 5/7/2014   • Psychiatric problem     depression   • Reactive depression 9/17/2013   • Renal disorder     stones   • Seizure (HCC) 1988    unknown cause       Past Surgical History:   Procedure Laterality Date   • THROMBECTOMY Right 5/25/2018    Procedure: FEMORAL ENDARTERECTOMY, ANGIOPLASTY WITH PATCH, FOREIGN BODY REMOVAL;  Surgeon: Carina Durbin M.D.;  Location: [facility];  Service: General   • RECOVERY  10/4/2013    Performed by [facility] at SURGERY SAME DAY [facility]   • FEMORAL POPLITEAL BYPASS  7/7/2013    Performed by Carina Durbin M.D. at SURGERY [facility]   • RECOVERY  7/6/2013    Performed by [facility] at [facility]   • ABDOMINAL HYSTERECTOMY  "TOTAL  1981    w/o BSO due to fibroids   • APPENDECTOMY      during hysterectomy   • OTHER      neuroma removed from feet 4-5x   • OTHER      abdominal stent   • TONSILLECTOMY     • WRIST ORIF         Allergies: Bee; Nsaids; Codeine; and Keflex    Family History   Problem Relation Age of Onset   • Other Sister      tremors / instability   • Lung Disease Sister      COPD    • Hyperlipidemia Sister    • Other Paternal Grandfather      brain aneurysm       Vitals:    05/23/18 1020 05/25/18 0634 05/25/18 0643 05/25/18 1114   Height: 1.549 m (5' 1\") 1.549 m (5' 1\")     Weight: 41.5 kg (91 lb 7.9 oz) 41.7 kg (91 lb 14.9 oz)     Weight % change since last entry.: 0 % 0.48 %     BP:   125/62    Pulse:   85 97   BMI (Calculated): 17.29 17.37     Resp:   16 16   Temp:   37.2 °C (99 °F) 38 °C (100.4 °F)    05/25/18 1130 05/25/18 1145 05/25/18 1200 05/25/18 1215   Height:       Weight:       Weight % change since last entry.:       BP:       Pulse: 88 81 80 77   BMI (Calculated):       Resp: (!) 21 15 15 15   Temp:        05/25/18 1230 05/25/18 1245 05/25/18 1300 05/25/18 1315   Height:       Weight:       Weight % change since last entry.:       BP:       Pulse: 76 83 81 77   BMI (Calculated):       Resp: (!) 10 (!) 8 13 13   Temp:        05/25/18 1330 05/25/18 1523 05/25/18 1530 05/25/18 1600   Height:       Weight:       Weight % change since last entry.:       BP:       Pulse: 76 90 85 91   BMI (Calculated):       Resp: 12 13 (!) 10 (!) 26   Temp:  36.3 °C (97.3 °F)      05/25/18 1630 05/25/18 1645 05/25/18 1700 05/25/18 1715   Height:       Weight:       Weight % change since last entry.:       BP:       Pulse: 90 81 75 78   BMI (Calculated):       Resp: (!) 31 12 (!) 11 (!) 31   Temp: 36.8 °C (98.2 °F)       05/25/18 1730   Height:    Weight:    Weight % change since last entry.:    BP:    Pulse: 70   BMI (Calculated):    Resp: (!) 11   Temp:        Physical Examination  General: Well-developed, well-nourished female " recovering from anesthesia   Neuro/Psych: alert and oriented ×4, cranial nerves II through XII are intact, no facial droop, calm and appropriate  HEENT: Normocephalic, atraumatic, PERRL (3 mm), Moist oral mucosa membranes, trachea is midline, no stridor  CVS: Normal sinus rhythm, regular rate and rhythm without murmurs/rubs/gallops, right femoral popliteal DP and PT pulses intact with right femoral dressing site that is C/D/I  Respiratory: Clear to auscultation bilaterally without wheezing/rhonchi/rales, breathing comfortably and speaking in full sentences  Abdomen/: Soft, nontender, nondistended, normal bowel sounds  Extremities: No clubbing/cyanosis/edema, no calf asymmetry, sensation intact  Skin: Warm, pink, dry, normal capillary refill    Recent Labs      05/23/18   1055   WBC  8.8   NEUTSPOLYS  69.90   LYMPHOCYTES  22.60   MONOCYTES  5.40   EOSINOPHILS  1.20   BASOPHILS  0.60     Recent Labs      05/23/18   1055   SODIUM  140   POTASSIUM  3.9   CHLORIDE  107   CO2  27   BUN  10   CREATININE  0.50   CALCIUM  9.6     Recent Labs      05/23/18   1055   GLUCOSE  90           Invalid input(s): [id number]  LE ART DUPLX/IMAG   Final Result      IR-EMBOLIZE-NEURO-INTRACRANIAL    (Results Pending)       Assessment and Plan:  Right ICA aneurysm status post endovascular repair including stent assisted device 5/25 by Dr. Tracy   -Strict blood pressure control with SBP less than 160   -Close neurologic monitoring with every 2 hour neuro checks   -Analgesia/antiemetics (start scopolamine)  Right femoral artery plaque rupture causing acute occlusion with associated ischemia S/P femoral endarterectomy 5/25 by Dr. Durbin   -Close monitoring of circulation/motor/sensation of right lower extremity   -Maintain perfusion with adequate blood pressure   -Groin checks, analgesia   -Plavix  Systemic arterial hypertension    -continue amlodipine with as needed labetalol/hydralazine to maintain SBP within goal  Hyperlipidemia  -controlled   -Continue statin  Prophylaxis, diet    Patient is critically ill today requiring my active management of her close neurologic monitoring as well as management of the resolving ischemic right lower extremity.    Discussed assessment/plan with patient, , RN.  Critical care time: 31 minutes.  No time overlap.  Procedures are not included in this time.  [id number]

## 2018-05-26 NOTE — CARE PLAN
Problem: Venous Thromboembolism (VTW)/Deep Vein Thrombosis (DVT) Prevention:  Goal: Patient will participate in Venous Thrombosis (VTE)/Deep Vein Thrombosis (DVT)Prevention Measures    Intervention: Ensure patient wears graduated elastic stockings (JASKARAN hose) and/or SCDs, if ordered, when in bed or chair (Remove at least once per shift for skin check)  SCD's in place for DVT prophylaxis.      Problem: Skin Integrity  Goal: Risk for impaired skin integrity will decrease    Intervention: Implement precautions to protect skin integrity in collaboration with the interdisciplinary team  Skin assessed, Rodolfo score completed, pt repositioned often to prevent breakdown.      Problem: Respiratory:  Goal: Respiratory status will improve    Intervention: Assess and monitor pulmonary status  Pt on 2L NC, titrated to RA,  in place, pt encouraged to cough and deep breathe.

## 2018-05-26 NOTE — CONSULTS
Hospital Medicine History and Physical    Date of Service  5/25/2018    Chief Complaint  No chief complaint on file.      History of Presenting Illness  67 y.o. Female w/ prior seizures and eventual work up with MRI found intracerebral aneurysms. On 5/25/18 she had interventional repair of the aneurysms by Dr Rivero.  He has requested I admit for close neurologic monitoring in the ICU.    Currently patient awake and alert with clear speech and moves all extremities. She has no headache, visual complaints.      Primary Care Physician  BEN Mahajan.    Consultants  Intensivist,   Attending: IR    Code Status  FULL    Review of Systems  Review of Systems   Constitutional: Negative for chills and fever.   HENT: Negative for hearing loss and sore throat.    Eyes: Negative for blurred vision and double vision.   Respiratory: Negative for cough and shortness of breath.    Cardiovascular: Negative for chest pain, palpitations and leg swelling.   Gastrointestinal: Negative for abdominal pain, diarrhea and nausea.   Genitourinary: Negative for dysuria.   Musculoskeletal: Negative for back pain and joint pain.   Skin: Negative for rash.   Neurological: Negative for dizziness, sensory change, speech change and headaches.   Psychiatric/Behavioral: Negative for depression. The patient is not nervous/anxious.         Past Medical History  Past Medical History:   Diagnosis Date   • Contact dermatitis due to cosmetics 9/16/2015   • PAD (peripheral artery disease) 5/7/2014   • Reactive depression 9/17/2013   • Seizure (HCC) 1988    unknown cause   • Environmental and seasonal allergies    • Guillain Barré syndrome (HCC)     Hx    • Hemorrhagic disorder (HCC)     plavix   • History of kidney stones     x2 episodes   • History of seizures     x2 episodes - no treatment needed   • Hyperlipidemia    • Hypertension    • Intracranial aneurysm    • Psychiatric problem     depression   • Renal disorder     stones        Surgical History  Past Surgical History:   Procedure Laterality Date   • THROMBECTOMY Right 5/25/2018    Procedure: FEMORAL ENDARTERECTOMY, ANGIOPLASTY WITH PATCH, FOREIGN BODY REMOVAL;  Surgeon: Carina Durbin M.D.;  Location: SURGERY [facility];  Service: General   • RECOVERY  10/4/2013    Performed by [facility] at SURGERY SAME DAY [facility] ORS   • FEMORAL POPLITEAL BYPASS  7/7/2013    Performed by Carina Durbin M.D. at SURGERY [facility]   • RECOVERY  7/6/2013    Performed by [facility] at [facility]   • ABDOMINAL HYSTERECTOMY TOTAL  1981    w/o BSO due to fibroids   • APPENDECTOMY      during hysterectomy   • OTHER      neuroma removed from feet 4-5x   • OTHER      abdominal stent   • TONSILLECTOMY     • WRIST ORIF         Medications  No current facility-administered medications on file prior to encounter.      Current Outpatient Prescriptions on File Prior to Encounter   Medication Sig Dispense Refill   • atorvastatin (LIPITOR) 40 MG Tab Take 1 Tab by mouth every bedtime. 90 Tab 1   • amLODIPine (NORVASC) 10 MG Tab TAKE 1 TABLET BY MOUTH EVERY DAY 30 Tab 0   • clopidogrel (PLAVIX) 75 MG Tab TAKE 1 TAB BY MOUTH EVERY DAY. 90 Tab 0   • Ascorbic Acid (VITAMIN C CR) 1000 MG Tab CR Take 1 Tab by mouth every day.     • Cholecalciferol (VITAMIN D3) 2000 UNIT CAPS Take 1 Cap by mouth every day.     • Multiple Vitamin (VITAMIN LIQUID PO) Take 30 mL by mouth every day.         Family History  Family History   Problem Relation Age of Onset   • Other Sister      tremors / instability   • Lung Disease Sister      COPD    • Hyperlipidemia Sister    • Other Paternal Grandfather      brain aneurysm       Social History  Social History   Substance Use Topics   • Smoking status: Current Some Day Smoker     Packs/day: 0.25     Types: Cigarettes   • Smokeless tobacco: Never Used      Comment: 1/2 ppd down to 4 cig/day since 4/13, down to 2 cig/day since 1014   • Alcohol use  0.6 oz/week     1 Glasses of wine per week      Comment: 3/week       Allergies  Allergies   Allergen Reactions   • Bee Anaphylaxis   • Nsaids Swelling     2 trips to ER w reactions of rash swelling with difficulty breathing.   • Codeine Nausea     dizzy   • Keflex Vomiting     No trouble with amoxil        Physical Exam  Laboratory   Hemodynamics  Temp (24hrs), Av.8 °C (98.3 °F), Min:36.3 °C (97.3 °F), Max:38 °C (100.4 °F)   Temperature: 36.3 °C (97.3 °F)  Pulse  Av.6  Min: 66  Max: 97 Heart Rate (Monitored): 87  Blood Pressure : 125/62, NIBP: 110/53      Respiratory      Respiration: 20, Pulse Oximetry: 94 %        RUL Breath Sounds: Clear, RML Breath Sounds: Diminished, RLL Breath Sounds: Clear, MAXIMILIANO Breath Sounds: Diminished, LLL Breath Sounds: Diminished    Physical Exam   Constitutional: She is oriented to person, place, and time. She appears well-developed and well-nourished. No distress.   HENT:   Head: Normocephalic and atraumatic.   Nose: Nose normal.   Mouth/Throat: Oropharynx is clear and moist.   Eyes: Conjunctivae and EOM are normal. Right eye exhibits no discharge. Left eye exhibits no discharge. No scleral icterus.   Neck: Normal range of motion. No tracheal deviation present.   Cardiovascular: Normal rate, regular rhythm, normal heart sounds and intact distal pulses.    No murmur heard.  Pulses:       Radial pulses are 2+ on the right side, and 2+ on the left side.        Dorsalis pedis pulses are 2+ on the right side, and 2+ on the left side.   Right groin femoral site w/o swelling.   Pulmonary/Chest: Effort normal and breath sounds normal. No respiratory distress. She has no wheezes.   Abdominal: Soft. Bowel sounds are normal. She exhibits no distension. There is no tenderness.   Musculoskeletal: She exhibits no edema.   Neurological: She is alert and oriented to person, place, and time. No cranial nerve deficit.   Skin: Skin is warm. She is not diaphoretic.   Psychiatric: She has a normal  mood and affect. Her behavior is normal. Thought content normal.   Vitals reviewed.      Recent Labs      05/23/18   1055   WBC  8.8   RBC  4.17*   HEMOGLOBIN  13.5   HEMATOCRIT  40.7   MCV  97.6   MCH  32.4   MCHC  33.2*   RDW  46.7   PLATELETCT  285   MPV  9.6     Recent Labs      05/23/18   1055   SODIUM  140   POTASSIUM  3.9   CHLORIDE  107   CO2  27   GLUCOSE  90   BUN  10   CREATININE  0.50   CALCIUM  9.6     Recent Labs      05/23/18   1055   GLUCOSE  90     Recent Labs      05/23/18   1055   INR  1.00             Lab Results   Component Value Date    TROPONINI <0.01 05/03/2015     Urinalysis:  No results found for: SPECGRAVITY, GLUCOSEUR, KETONES, NITRITE, WBCURINE, RBCURINE, BACTERIA, EPITHELCELL     Imaging  5/25/18 LE U/S: Right LE   Patent right external iliac artery at the distal level. Flow becomes    obstructed at the proximal common femoral level directly under incision.    Echogenic area measuring 1.3cm identified at the level of obstruction. Flow    distal to obstruction appears to be retrograde fill from the profunda    femoral artery.   Assessment/Plan     I anticipate this patient is appropriate for observation status at this time.    Aneurysm (HCC)- (present on admission)   Assessment & Plan    Cerebral.  Had intracerebral repair by Dr Rivero 5/15/18        Essential hypertension- (present on admission)   Assessment & Plan    Monitor vitals  On amlodipine        PAD (peripheral artery disease) (HCC)- (present on admission)   Assessment & Plan    Atorvastatin  Encouraged smoking cessation  Prior right leg arterial surgery by Dr Durbin        Dyslipidemia- (present on admission)   Assessment & Plan    statin        History of seizures- (present on admission)   Assessment & Plan    Likely due to aneurysms        HTN (hypertension)- (present on admission)   Assessment & Plan    Amlodipine  Monitor vitals.        Tobacco abuse   Assessment & Plan    Encouraged cessation and educated on harm of  smoking  Discussion time 5 minutes            VTE prophylaxis: SCDs.

## 2018-05-26 NOTE — DISCHARGE SUMMARY
CHIEF COMPLAINT ON ADMISSION  No chief complaint on file.      CODE STATUS  Full Code    HPI & HOSPITAL COURSE  This is a 67 y.o. female here with known right ICA and right MCA aneurysms.   Ms. Gandara has a history of PAD, tobacco use, and seizure disorder that was admitted on 5/25 for elective endovascular repair of the R ICA and R MCA aneurysms. She tolerated the procedure well though after the closure of the right femoral artery she developed pain and numbness of the right leg. Dr. Durbin, vascular surgery, was consulted and took her to the operating room with a right femoral artery endarterectomy and angioplasty.  This morning, she is feeling much better. I discussed her condition with Dr. Gerardo as well as Dr. Rivero and both have cleared her for discharge home on plavix.    The patient recovered much more quickly than anticipated on admission.  Smoking cessation was discussed.   Therefore, she is discharged in good and stable condition with close outpatient follow-up.    SPECIFIC OUTPATIENT FOLLOW-UP  Dr. Gifty Rivero      DISCHARGE PROBLEM LIST  Active Problems:    Aneurysm of internal carotid artery POA: Yes    Aneurysm of middle cerebral artery POA: Yes    Femoral artery occlusion, right (HCC) POA: Yes    Tobacco abuse POA: Unknown    HTN (hypertension) POA: Yes    History of seizures POA: Yes    Dyslipidemia POA: Yes    PAD (peripheral artery disease) (HCC) POA: Yes    Essential hypertension POA: Yes    Aneurysm (HCC) POA: Yes      Overview: MRI brain 2017:       1.  11 mm sized aneurysm from the ophthalmic segment of the right internal       carotid artery.      2.  8 x 5 mm sized wide necked aneurysm from the left cavernous segment of       the interpreted artery.      3.  5 x 3 mm sized aneurysm of the right middle cerebral artery       bifurcation            Followed by Dr. Curiel  Resolved Problems:    * No resolved hospital problems. *      FOLLOW UP  Future Appointments  Date Time  Provider Department [facility]   6/18/2018 11:00 AM [facility]  [facility] None     No follow-up provider specified.    MEDICATIONS ON DISCHARGE   Juliocesar Reina Maria Eugenia   Home Medication Instructions DAILY:[id number]    Printed on:05/26/18 1030   Medication Information                      amLODIPine (NORVASC) 10 MG Tab  TAKE 1 TABLET BY MOUTH EVERY DAY             Ascorbic Acid (VITAMIN C CR) 1000 MG Tab CR  Take 1 Tab by mouth every day.             atorvastatin (LIPITOR) 40 MG Tab  Take 1 Tab by mouth every bedtime.             Cholecalciferol (VITAMIN D3) 2000 UNIT CAPS  Take 1 Cap by mouth every day.             clopidogrel (PLAVIX) 75 MG Tab  TAKE 1 TAB BY MOUTH EVERY DAY.             Multiple Vitamin (VITAMIN LIQUID PO)  Take 30 mL by mouth every day.                 DIET  Orders Placed This Encounter   Procedures   • DIET ORDER     Standing Status:   Standing     Number of Occurrences:   1     Order Specific Question:   Diet:     Answer:   Regular [1]       ACTIVITY  As tolerated.  15-lb lifting restriction      CONSULTATIONS  Dr. Durbin, vascular surgery      PROCEDURES  1. Dr. Rivero on 5/25:  PostOp Diagnosis: Right ICA and Mca Aneurysms.     Procedure(s):      Endovascualr repair of right ICA aneurym  Stent assited endovascualr repair of right MCA aneurym    2. Dr. Frank on 5/25:  PREOPERATIVE DIAGNOSIS:  Right femoral artery occlusion.     POSTPROCEDURE DIAGNOSIS:  Right femoral artery occlusion.     PROCEDURES:  1.  Right femoral artery exploration.  2.  Right common femoral artery endarterectomy and patch angioplasty    LABORATORY  Lab Results   Component Value Date/Time    SODIUM 140 05/23/2018 10:55 AM    POTASSIUM 3.9 05/23/2018 10:55 AM    CHLORIDE 107 05/23/2018 10:55 AM    CO2 27 05/23/2018 10:55 AM    GLUCOSE 90 05/23/2018 10:55 AM    BUN 10 05/23/2018 10:55 AM    CREATININE 0.50 05/23/2018 10:55 AM        Lab Results   Component Value Date/Time    WBC 8.8 05/23/2018 10:55 AM     HEMOGLOBIN 13.5 05/23/2018 10:55 AM    HEMATOCRIT 40.7 05/23/2018 10:55 AM    PLATELETCT 285 05/23/2018 10:55 AM        Total time of the discharge process exceeds 32 minutes

## 2018-05-26 NOTE — PROGRESS NOTES
Pulmonary Critical Care Progress Note        Date of admission: 5/25/2018    Chief Complaint: ICA aneurysm    History of Present Illness: 67 y.o. female with a past medical history of hypertension, hyperlipidemia, tobacco abuse who while undergoing dizziness workup was found to have an ICA aneurysm approximately 9 months ago.  She was brought into the hospital today for a scheduled elective endovascular repair of the aneurysm which was performed by Dr. Tracy.  In the recovery room patient started to develop severe right lower extremity pain and numbness and was found to have retrograde flow in the superficial femoral artery below the closure site.  Dr. Durbin with vascular surgery was consulted and took the patient to the operating room again this time for right femoral artery exploration with removal of the Melida closure device a patient was brought to the intensive care unit postoperativelynd endarterectomy.  And I was consulted for assistance in her critical care management.      ROS:  Respiratory: negative cough and negative shortness of breath, Cardiac: negative chest pain and negative palpitations, GI: negative nausea, negative vomiting and negative abdominal pain.  All other systems reviewed with patient and are negative.    Interval Events:  24 hour interval history reviewed    - AAOx4, no HA nor nausea   - good circulation in RLE with intact sensation   - adequate VS   - sylvie regular diet   - ghosh --> dc   - AF     PFSH:  No change.    Physical Exam   Constitutional: She is oriented to person, place, and time. She appears well-developed and well-nourished. No distress.   HENT:   Head: Normocephalic and atraumatic.   Mouth/Throat: Oropharynx is clear and moist. No oropharyngeal exudate.   Eyes: Conjunctivae and EOM are normal. Pupils are equal, round, and reactive to light.   Neck: Neck supple. No JVD present. No tracheal deviation present.   Cardiovascular: Normal rate, regular rhythm, normal heart sounds  and intact distal pulses.  Exam reveals no friction rub.    No murmur heard.  Pulmonary/Chest: Effort normal and breath sounds normal. No respiratory distress. She has no wheezes.   Abdominal: Soft. Bowel sounds are normal. She exhibits no distension. There is no tenderness.   Musculoskeletal: She exhibits no edema or deformity.   Right arterial puncture site is clean/dry/intact, strong pulses/sensation/motor distally   Neurological: She is alert and oriented to person, place, and time. No cranial nerve deficit or sensory deficit. Coordination normal.   Skin: Skin is warm and dry. Capillary refill takes less than 2 seconds. No rash noted.   Psychiatric: She has a normal mood and affect. Her behavior is normal.   Nursing note and vitals reviewed.    Respiratory:   Room air  Pulse Oximetry: 92 %          ImagingAvailable data reviewed         Invalid input(s): [id number]    HemoDynamics:  Pulse: 75, Heart Rate (Monitored): 73  NIBP: 120/56       Imaging: Available data reviewed        Neuro:  GCS Total [place] Coma Score: 15       Imaging: Available data reviewed    Fluids:  Intake/Output       05/24/18 0700 - 05/25/18 0659 (Not Admitted) 05/25/18 0700 - 05/26/18 0659 05/26/18 0700 - 05/27/18 0659      2738-4821 6340-8115 Total 3687-9748 6246-7459 Total 0723-1647 0156-0315 Total       Intake    P.O.  --  -- --  --  600 600  --  -- --    P.O. -- -- -- -- 600 600 -- -- --    I.V.  --  -- --  2600  -- 2600  --  -- --    Crystalloid Intake -- -- -- 2000 -- 2000 -- -- --    IV Volume -- -- -- 600 -- 600 -- -- --    Total Intake -- -- -- 2600 600 3200 -- -- --       Output    Urine  --  -- --  2850  1500 4350  --  -- --    Output (mL) (Urinary Catheter Indwelling Catheter) -- -- -- 2850 1500 4350 -- -- --    Blood  --  -- --  25  -- 25  --  -- --    Est. Blood Loss (mL) -- -- -- 25 -- 25 -- -- --    Total Output -- -- -- 5215 1251 7851 -- -- --       Net I/O     -- -- -- -287 -512 -7431 -- -- --           Recent Labs       18   1055   SODIUM  140   POTASSIUM  3.9   CHLORIDE  107   CO2  27   BUN  10   CREATININE  0.50   CALCIUM  9.6       GI/Nutrition:  Imaging: Available data reviewed  taking PO  Liver Function  Recent Labs      18   1055   GLUCOSE  90       Heme:  Recent Labs      18   1055   RBC  4.17*   HEMOGLOBIN  13.5   HEMATOCRIT  40.7   PLATELETCT  285   PROTHROMBTM  12.9   INR  1.00       Infectious Disease:  Temp  Av.7 °C (98 °F)  Min: 36.3 °C (97.3 °F)  Max: 38 °C (100.4 °F)  Micro: reviewed  Recent Labs      18   1055   WBC  8.8   NEUTSPOLYS  69.90   LYMPHOCYTES  22.60   MONOCYTES  5.40   EOSINOPHILS  1.20   BASOPHILS  0.60     Current Facility-Administered Medications   Medication Dose Frequency Provider Last Rate Last Dose   • amLODIPine (NORVASC) tablet 10 mg  10 mg QDAY LENCHO MarieeOJulia   10 mg at 18   • ascorbic acid tablet 500 mg  500 mg BID LENCHO MarieeOJulia   500 mg at 18   • atorvastatin (LIPITOR) tablet 40 mg  40 mg QHS LENCHO MarieeO.   40 mg at 18   • vitamin D (cholecalciferol) tablet 2,000 Units  2,000 Units DAILY Shemar Lynch D.O.       • clopidogrel (PLAVIX) tablet 75 mg  75 mg QDAY Shemar Lynch D.O.       • senna-docusate (PERICOLACE or SENOKOT S) 8.6-50 MG per tablet 2 Tab  2 Tab BID Shemar Lynch D.O.        And   • polyethylene glycol/lytes (MIRALAX) PACKET 1 Packet  1 Packet QDAY PRN Shemar Lynch D.O.        And   • magnesium hydroxide (MILK OF MAGNESIA) suspension 30 mL  30 mL QDAY PRN Shemar Lynch D.O.        And   • bisacodyl (DULCOLAX) suppository 10 mg  10 mg QDAY PRN Shemar Lynch D.O.       • labetalol (NORMODYNE,TRANDATE) injection 10 mg  10 mg Q4HRS PRN Shemar Lynch D.O.       • ondansetron (ZOFRAN) syringe/vial injection 4 mg  4 mg Q4HRS PRN LENCHO MarieeO.   4 mg at 18   • ondansetron (ZOFRAN ODT) dispertab 4 mg  4 mg Q4HRS PRN RASHI Mariee.O.       • scopolamine  (TRANSDERM-SCOP) patch 1 Patch  1 Patch Q72HRS Jeremy M Gonda, M.D.   1 Patch at 05/25/18 1751     Last reviewed on 5/25/2018  4:45 PM by Chay Ga R.N.    Quality  Measures:  Medications reviewed, Labs reviewed and Radiology images reviewed  Awad catheter: No Awad        DVT prophylaxis - mechanical: SCDs  Ulcer prophylaxis: Not indicated    Assessed for rehab: Patient returned to prior level of function, rehabilitation not indicated at this time      Assessment/Plan:  Right ICA aneurysm status post endovascular repair including stent assisted device 5/25 by Dr. Tracy              -Continue blood pressure control with SBP less than 160              -Analgesia/antiemetics prn  Right femoral artery plaque rupture causing acute occlusion with associated ischemia S/P femoral endarterectomy 5/25 by Dr. Durbin              -Puncture site monitoring, analgesia              -Plavix  Systemic arterial hypertension               -continue amlodipine  Hyperlipidemia -controlled              -Continue statin  Prophylaxis, diet    Clear from pulmonary/critical care standpoint to discharge home today if okay with vascular surgery and interventional radiology.  No need for outpatient pulmonary follow-up.    Discussed patient condition with Family, RN, RT, Pharmacy, Charge nurse / hot rounds, Patient and vascular surgery, IR.

## 2018-05-26 NOTE — PROGRESS NOTES
Patient d/c home with spouse. Patient given discharge instructions and explained light activity instructions. Patient verbalized understanding. Dressing changed, cdi and no hematoma present at this time.

## 2018-05-26 NOTE — CARE PLAN
Problem: Bowel/Gastric:  Goal: Normal bowel function is maintained or improved  Outcome: PROGRESSING SLOWER THAN EXPECTED  Patient refusing stool softeners despite education. Last bm was PTA.     Problem: Discharge Barriers/Planning  Goal: Patient's continuum of care needs will be met  Outcome: PROGRESSING AS EXPECTED  Cleared by MD's to go home today.

## 2018-05-26 NOTE — PROGRESS NOTES
PO 1    PROCEDURES:  1.  Right femoral artery exploration.  2.  Right common femoral artery endarterectomy and patch angioplasty.     SURGEON:  Carina Durbin MD    ICU alert and stable.  Normal bilateral PT and DP pulses.  Right groin incision is flat and small amount blood on dressing.    Impression:    Successful repair right femoral artery.  OK to discharge from vascula standpoint.  I explained wound care.  Non strenuous activity.  ]Dr. Durbin will follow up.

## 2018-05-26 NOTE — OP REPORT
DATE OF SERVICE:  05/25/2018    PREOPERATIVE DIAGNOSIS:  Right femoral artery occlusion.    POSTPROCEDURE DIAGNOSIS:  Right femoral artery occlusion.    PROCEDURES:  1.  Right femoral artery exploration.  2.  Right common femoral artery endarterectomy and patch angioplasty.    SURGEON:  Carina Durbin MD    ANESTHESIA:  General anesthesia.    INDICATIONS:  The patient is a 67-year-old woman who was taken to the angio   suite earlier today for coiling of an internal carotid artery aneurysm.    Unfortunately, a StarClose device was deployed and femoral artery occlusion   occurred.  She was taken to the operating room for operative repair of the   vessel.  Risks and benefits were explained and include bleeding, infection,   arteriovenous injury, reoperation, and nerve injury.  She understands and   agrees to proceed.    DESCRIPTION OF PROCEDURE:  Patient was taken to the operating room and placed   in supine position.  After adequate general anesthesia, the right groin was   prepped and draped in the usual sterile fashion with Chloraseptic prep, cloth   towels, and paper drapes.  Local anesthesia was instilled over the femoral   artery and an incision created.  Proximal and distal control was obtained and   the femoral artery encircled with vessel loops.  Side branches were encircled   as well.  The patient received 3000 units of heparin and this was allowed to   circulate.  Proximal and distal control was obtained.    An arteriotomy was carried out in the right common femoral artery and with   Rodriguez scissors, I moved up the vessel.  The StarClose device was embedded in   the posterior wall plaque, suggesting that the needle had grabbed that on   entry to the femoral artery.    I performed an endarterectomy of the femoral artery with a freer elevator,   taking the dissection up to the circumflex vessels.  I tacked the endpoint of   the femoral plaque down with 6-0 Prolene suture.  I then sutured a 6 mm    HemaCarotid patch in place.  The patch opened the femoral vessel after   endarterectomy.  I did pass a Paz catheter into the superficial femoral   artery, but no evidence of thrombus was obtained from that or the profunda   femoris artery.  This was done to be sure no clot was left behind if any was   present.  Flow through the femoral artery was excellent and the patch repair   required several other interrupted 6-0 Prolene sutures to complete my   hemostatic repair.  The wound was irrigated with copious amounts of saline and   closed with interrupted 3-0 Vicryl sutures in 2 layers followed by running   3-0 Vicryl and finally, 4-0 Monocryl.  There were no apparent complications.    Sponge, needle, and instrument counts were correct x2.  Sterile occlusive   dressings were placed.  The patient had a palpable dorsal pedal pulse at the   completion of the procedure.       ____________________________________     MD GORGE Andres / MARISOL    DD:  05/25/2018 18:04:43  DT:  05/25/2018 18:28:04    D#:  [id number]  Job#:  [id number]

## 2018-05-26 NOTE — DISCHARGE INSTRUCTIONS
Discharge Instructions    Discharged to home by car with relative. Discharged via wheelchair, hospital escort: Yes.  Special equipment needed: Not Applicable    Be sure to schedule a follow-up appointment with your primary care doctor or any specialists as instructed.     Discharge Plan:   Diet Plan: Discussed  Activity Level: Discussed  Smoking Cessation Offered: Patient Refused  Confirmed Follow up Appointment: Patient to Call and Schedule Appointment  Confirmed Symptoms Management: Discussed  Medication Reconciliation Updated: Yes  Influenza Vaccine Indication: Patient Refuses, Not indicated: Previously immunized this influenza season and > 8 years of age    I understand that a diet low in cholesterol, fat, and sodium is recommended for good health. Unless I have been given specific instructions below for another diet, I accept this instruction as my diet prescription.   Other diet: Regular    Special Instructions: none    · Is patient discharged on Warfarin / Coumadin?   No     Depression / Suicide Risk    As you are discharged from this [facility], it is important to learn how to keep safe from harming yourself.    Recognize the warning signs:  · Abrupt changes in personality, positive or negative- including increase in energy   · Giving away possessions  · Change in eating patterns- significant weight changes-  positive or negative  · Change in sleeping patterns- unable to sleep or sleeping all the time   · Unwillingness or inability to communicate  · Depression  · Unusual sadness, discouragement and loneliness  · Talk of wanting to die  · Neglect of personal appearance   · Rebelliousness- reckless behavior  · Withdrawal from people/activities they love  · Confusion- inability to concentrate     If you or a loved one observes any of these behaviors or has concerns about self-harm, here's what you can do:  · Talk about it- your feelings and reasons for harming yourself  · Remove any means that you  might use to hurt yourself (examples: pills, rope, extension cords, firearm)  · Get professional help from the community (Mental Health, Substance Abuse, psychological counseling)  · Do not be alone:Call your Safe Contact- someone whom you trust who will be there for you.  · Call your local CRISIS HOTLINE 195-8426 or 699-199-4709  · Call your local Children's Mobile Crisis Response Team [place] (919) 303-0252 or wwwIsai  · Call the toll free National Suicide Prevention Hotlines   · National Suicide Prevention Lifeline 284-462-UNSR (7280)  · [employer] Line Network 800-SUICIDE (813-0119)        Femoral Endarterectomy  Introduction  Femoral endarterectomy is a procedure to clean a clogged femoral artery. The femoral artery brings blood to the legs. This artery is found in the groin area and can become clogged with plaque. Plaque is made up of fats (lipids), cholesterol, calcium, and fibrous tissue. When the femoral artery is clogged, this condition is called peripheral arterial disease. The plaque may partially or totally block blood flow or cause a clot to form in the femoral artery.  Tell a health care provider about:  · Any allergies you have.  · All medicines you are taking, including vitamins, herbs, eye drops, creams, and over-the-counter medicines.  · Any problems you or family members have had with anesthetic medicines.  · Any blood disorders you have.  · Any surgeries you have had.  · Any medical conditions you have, including diabetes or kidney problems.  · Whether you are pregnant or may be pregnant.  What are the risks?  Generally, this is a safe procedure. However, problems may occur, including:  · Infection.  · Bleeding.  · Allergic reactions to medicines or dyes.  · Nerve damage.  · Blood clots. These can form in the legs. Clots that break loose could travel to the heart, lungs, or brain.  · Return of plaque buildup. This may cause another blockage. This can happen months or years  after the procedure.  What happens before the procedure?  · Follow instructions from your health care provider about eating or drinking restrictions.  · Ask your health care provider about changing or stopping your regular medicines. This is especially important if you are taking diabetes medicines or blood thinners.  · You may need to take medicine to prevent blood clots. Aspirin or blood thinners (anticoagulants) are used for this. Take these medicines only as told by your health care provider.  · Plan to have someone take you home after the procedure.  · If you go home right after the procedure, plan to have someone with you for 24 hours.  · Ask your health care provider how your surgical site will be marked or identified.  · You may be given antibiotic medicine to help prevent infection.  · You may have tests, including:  ¨ Blood tests.  ¨ Doppler ultrasonogram. This test uses sound waves to check blood flow through your femoral artery and leg.  ¨ Angiogram. This is a type of X-ray. A dye is put in your blood so that it will show up on the X-ray.  What happens during the procedure?  · To reduce your risk of infection:  ¨ Your health care team will wash or sanitize their hands.  ¨ Your skin will be washed with soap.  · Small monitors will be placed on your body. These will be used to check your heart, blood pressure, and oxygen level.  · An IV tube will be inserted into one of your veins.  · You will be given one or more of the following:  ¨ A medicine to help you relax (sedative).  ¨ A medicine to numb the area (local anesthetic).  ¨ A medicine to make you fall asleep (general anesthetic).  ¨ A medicine that is injected into your spine to numb the area below and slightly above the injection site (spinal anesthetic).  ¨ A medicine that is injected into an area of your body to numb everything below the injection site (regional anesthetic).  · Your surgeon will make a cut (incision) in the groin area above the  femoral artery.  · Your surgeon may put a clamp on the artery above and below the blockage. This will stop blood from flowing through the area.  · The artery will be cut open. The plaque will be stripped away from the inner walls of the artery.  · Your surgeon may do an angiogram during the procedure to see where the plaque is and to see when all the plaque is gone.  · The artery will be closed. Then, the clamp will be removed so that blood can flow through the artery again.  · Your surgeon will close the incision. Staples or sutures (stitches) may be used.  · A bandage (dressing) will be placed on the incision.  The procedure may vary among health care providers and hospitals.  What happens after the procedure?  · Your blood pressure, heart rate, breathing rate, and blood oxygen level will be monitored often until the medicines you were given have worn off.  · You may be given medicine for pain.  · You may have an angiogram to make sure that blood is moving through the artery like it should.  · Do not drive for 24 hours if you received a sedative.  This information is not intended to replace advice given to you by your health care provider. Make sure you discuss any questions you have with your health care provider.  Document Released: 03/14/2011 Document Revised: 05/25/2017 Document Reviewed: 06/06/2016  © 2017 [employer]      Femoral Endarterectomy, Care After  Refer to this sheet in the next few weeks. These discharge instructions provide you with general information on caring for yourself after you leave the hospital. Your caregiver may also give you specific instructions. Your treatment has been planned according to the most current medical practices available, but unavoidable complications sometimes occur. If you have any problems or questions after discharge, please call your caregiver.  HOME CARE INSTRUCTIONS   · Medicine.  ¨ Some pain is normal after this type of surgery. Take the pain medicine that was  prescribed. Follow the directions carefully. Do not take over-the-counter pain medicine unless your caregiver says it is okay. Some of them can cause bleeding problems after surgery.  ¨ You might need to take a blood thinner (anticoagulant). This keeps blood clots from forming. Follow the directions carefully.  · Wound care.  ¨ Keep the bandage (dressing) on your surgical cut (incision) dry for a few days after surgery. Once the dressing can be taken off, it will be okay for you to shower. Do not take a tub bath for at least 2 weeks after surgery.  ¨ You will need to return to have your stitches (sutures) or staples removed. This is usually done about a week after your surgery.  · Activity.  ¨ It is important to walk as much as possible. This helps keep blood clots from forming in your legs.  ¨ Ask your caregiver before going up or down steps. Even after your caregiver says it is okay to use stairs, you may need help with steps for awhile.  ¨ Do not sit or stand for long periods of time. When you do sit, keep your legs raised. Put your feet on a stool or lie on the couch.  ¨ Do not lift anything heavy for several weeks.  ¨ Do not bend or strain for several weeks.  ¨ Do not drive until your caregiver says it is okay. Do not drive while taking narcotic pain medicine.  ¨ Ask your caregiver when you can go back to work. This will depend on the type of work you do.  ¨ Go back to your normal routine slowly. Give your body time to heal. Ask your caregiver if you have questions about any particular activity.  · Lifestyle.  ¨ You might need to change some habits to make sure your arteries stay clear.  ¨ Talk with a nutritionist about what you eat. You may need to eat less of some types of food. Good foods are those low in saturated fats. Vegetables, fruits, and whole grains are good for you.  ¨ Think about exercising more. Check with your caregiver before starting a new exercise plan.  ¨ Keep your weight under control.  ¨ Do  not smoke.  SEEK MEDICAL CARE IF:   · You have any questions about medicines.  · Pain continues, even after taking pain medicine.  · You have pain or cramps in your leg while walking.  · You have an oral temperature above 102° F (38.9° C).  SEEK IMMEDIATE MEDICAL CARE IF:   · Pain gets much worse.  · You have shortness of breath.  · You have chest pain.  · The area around the incision becomes red and swells.  · Blood or other liquid leaks from the incision.  · Your leg becomes red, swollen, or sore.  · Your leg or foot changes color or becomes numb.  · You have an oral temperature above 102° F (38.9° C), not controlled by medicine.  MAKE SURE YOU:   · Understand these instructions.  · Will watch your condition.  · Will get help right away if you are not doing well or get worse.     This information is not intended to replace advice given to you by your health care provider. Make sure you discuss any questions you have with your health care provider.     Document Released: 03/14/2011 Document Revised: 10/08/2014 Document Reviewed: 03/14/2011  [employer] Patient Education ©2016 [employer]

## 2018-05-29 ENCOUNTER — PATIENT OUTREACH (OUTPATIENT)
Dept: HEALTH INFORMATION MANAGEMENT | Facility: OTHER | Age: 68
End: 2018-05-29

## 2018-06-04 DIAGNOSIS — I67.1 INTRACRANIAL ANEURYSM: ICD-10-CM

## 2018-06-06 ENCOUNTER — HOSPITAL ENCOUNTER (OUTPATIENT)
Dept: RADIOLOGY | Facility: MEDICAL CENTER | Age: 68
End: 2018-06-06
Attending: RADIOLOGY

## 2018-06-06 DIAGNOSIS — I67.1 INTRACRANIAL ANEURYSM: ICD-10-CM

## 2018-06-06 NOTE — CONSULTS
"Neuro Interventional Service Consultation      Re: Reina Gandara     MRN: [id number]   : 1950    Reina Gandara was referred to our service by Maria Eugenia DEE.  She is a 67 y.o. female seen in clinic for follow up after two aneurysm embolizations and discussion of treatment of a third aneurysm. She is also under the care of Carina Durbin MD.    History of Present Illness:   presented with memory loss in 2017, which prompted workup for dementia. An MRA was ultimately performed, which revealed three unruptured aneurysms: 11 mm sized aneurysm from the ophthalmic segment of the right internal carotid artery, 8 x 5 mm sized wide necked aneurysm from the left cavernous segment of the internal carotid artery, and 5 x 3 mm sized aneurysm of the right middle cerebral artery bifurcation. She has a history of surgery for left foot arterial occlusion but denies problems with left leg cramping and pain with walking. She takes Plavix for this problem and complains of easy bruising and bleeding. She is unable to take aspirin. She describes two isolated incidences of seizure in  3-4 months apart. She has not had seizures before or since that time, and sought care. She states she was diagnosed with an \"AV anomaly\" and maybe even an aneurysm and underwent a catheter angiogram with no reported evidence of aneurysm; however her recollection of those events are poor. She does not know her mother's family history but reports a paternal uncle who  from an aneurysm rupture, as well as several more distant paternal relatives who had \"brain hemorrhage.\" She is a daily smoker. She has controlled hypertension. She was referred to Janes Rivero MD for evaluation and management of the multiple intracranial aneurysms and underwent a procedure on May 25, 2018 to treat both aneurysms in the right territory. Dr. Rivero performed coil embolization of the ophthalmic segment right " internal carotid artery aneurysm and the stent assisted coiling of the right middle cerebral artery aneurysm. The coiling procedure was unremarkable, however post procedure the patient had right lower extremity pain and changes consistent with arterial occlusion and was taken emergently to the OR by Dr. Durbin, who performed a successful endarterectomy and patch angioplasty. The patient recovered well in the intensive care unit and was discharged to home the following day. She followed up with Dr. Durbin yesterday and reports she has recovered. Today, she denies pain in her legs with walking. She bruises easily because of the Plavix and denies heavy bleeding with epistaxis, hematuria, or bloody stools. She reports mild right temporal area fullness or pressure sensation that is not pain. She reports fatigue since the procedure and dizziness if she moves her head too quickly. She is functioning nearly at baseline. She denies weakness or vision changes.     She is seen today for review of imaging studies and discussion of intervention for the left ICA aneurysm.     Past Medical History:   Diagnosis Date   • Contact dermatitis due to cosmetics 9/16/2015   • Environmental and seasonal allergies    • Guillain Barré syndrome (HCC)     Hx    • Hemorrhagic disorder (HCC)     plavix   • History of kidney stones     x2 episodes   • History of seizures     x2 episodes - no treatment needed   • Hyperlipidemia    • Hypertension    • Intracranial aneurysm    • PAD (peripheral artery disease) 5/7/2014   • Psychiatric problem     depression   • Reactive depression 9/17/2013   • Renal disorder     stones   • Seizure (HCC) 1988    unknown cause     Past Surgical History:   Procedure Laterality Date   • THROMBECTOMY Right 5/25/2018    Procedure: FEMORAL ENDARTERECTOMY, ANGIOPLASTY WITH PATCH, FOREIGN BODY REMOVAL;  Surgeon: Carina Durbin M.D.;  Location: SURGERY [facility];  Service: General   • RECOVERY  10/4/2013     Performed by [facility]Rec[facility] Surgery at SURGERY SAME DAY [facility]   • FEMORAL POPLITEAL BYPASS  7/7/2013    Performed by Carina Durbin M.D. at SURGERY [facility]   • RECOVERY  7/6/2013    Performed by [facility]Rec[facility] Surgery at SURGERY [facility]   • ABDOMINAL HYSTERECTOMY TOTAL  1981    w/o BSO due to fibroids   • APPENDECTOMY      during hysterectomy   • OTHER      neuroma removed from feet 4-5x   • OTHER      abdominal stent   • TONSILLECTOMY     • WRIST ORIF         Operative report Carina Durbin MD May 25, 2018 at [facility]:  PROCEDURES:  1.  Right femoral artery exploration.  2.  Right common femoral artery endarterectomy and patch angioplasty.    Social History     Social History   • Marital status:      Spouse name: N/A   • Number of children: N/A   • Years of education: N/A     Occupational History   •  [employer]     Social History Main Topics   • Smoking status: Current Some Day Smoker     Packs/day: 0.25     Types: Cigarettes   • Smokeless tobacco: Never Used      Comment: 1/2 ppd down to 4 cig/day since 4/13, down to 2 cig/day since 1014   • Alcohol use 0.6 oz/week     1 Glasses of wine per week      Comment: 3/week   • Drug use: No   • Sexual activity: Not Currently     Partners: Male      Comment: one son; ;  wk: insurance     Other Topics Concern   • Not on file     Social History Narrative   • No narrative on file     Family History   Problem Relation Age of Onset   • Other Sister      tremors / instability   • Lung Disease Sister      COPD    • Hyperlipidemia Sister    • Other Paternal Grandfather      brain aneurysm       Review of Systems:  Constitutional: positive for fatigue  Eyes: positive for contacts/glasses, negative for visual disturbance  Respiratory: negative  Hematologic/lymphatic: positive for easy bruising  Musculoskeletal:negative for right lower extremity cramping with ambulation  Neurological: positive for dizziness and headaches,  negative for memory problems  A comprehensive 14-point review of systems was negative except as described above.     Labs:      Ref. Range 5/23/2018 10:55   WBC Latest Ref Range: 4.8 - 10.8 K/uL 8.8   RBC Latest Ref Range: 4.20 - 5.40 M/uL 4.17 (L)   Hemoglobin Latest Ref Range: 12.0 - 16.0 g/dL 13.5   Hematocrit Latest Ref Range: 37.0 - 47.0 % 40.7   MCV Latest Ref Range: 81.4 - 97.8 fL 97.6   MCH Latest Ref Range: 27.0 - 33.0 pg 32.4   MCHC Latest Ref Range: 33.6 - 35.0 g/dL 33.2 (L)   RDW Latest Ref Range: 35.9 - 50.0 fL 46.7   Platelet Count Latest Ref Range: 164 - 446 K/uL 285   MPV Latest Ref Range: 9.0 - 12.9 fL 9.6   Neutrophils-Polys Latest Ref Range: 44.00 - 72.00 % 69.90   Neutrophils (Absolute) Latest Ref Range: 2.00 - 7.15 K/uL 6.17   Lymphocytes Latest Ref Range: 22.00 - 41.00 % 22.60   Lymphs (Absolute) Latest Ref Range: 1.00 - 4.80 K/uL 2.00   Monocytes Latest Ref Range: 0.00 - 13.40 % 5.40   Monos (Absolute) Latest Ref Range: 0.00 - 0.85 K/uL 0.48   Eosinophils Latest Ref Range: 0.00 - 6.90 % 1.20   Eos (Absolute) Latest Ref Range: 0.00 - 0.51 K/uL 0.11   Basophils Latest Ref Range: 0.00 - 1.80 % 0.60   Baso (Absolute) Latest Ref Range: 0.00 - 0.12 K/uL 0.05   Immature Granulocytes Latest Ref Range: 0.00 - 0.90 % 0.30   Immature Granulocytes (abs) Latest Ref Range: 0.00 - 0.11 K/uL 0.03   Nucleated RBC Latest Units: /100 WBC 0.00   NRBC (Absolute) Latest Units: K/uL 0.00   Sodium Latest Ref Range: 135 - 145 mmol/L 140   Potassium Latest Ref Range: 3.6 - 5.5 mmol/L 3.9   Chloride Latest Ref Range: 96 - 112 mmol/L 107   Co2 Latest Ref Range: 20 - 33 mmol/L 27   Anion Gap Latest Ref Range: 0.0 - 11.9  6.0   Glucose Latest Ref Range: 65 - 99 mg/dL 90   Bun Latest Ref Range: 8 - 22 mg/dL 10   Creatinine Latest Ref Range: 0.50 - 1.40 mg/dL 0.50   GFR If  Latest Ref Range: >60 mL/min/1.73 m 2 >60   GFR If Non  Latest Ref Range: >60 mL/min/1.73 m 2 >60   Calcium Latest Ref  Range: 8.5 - 10.5 mg/dL 9.6   PT Latest Ref Range: 12.0 - 14.6 sec 12.9   INR Latest Ref Range: 0.87 - 1.13  1.00       Radiology:   Neurointerventional radiology procedure on May 25, 2018 at [facility]:  1.  Endovascular repair of right para ophthalmic artery aneurysm  2.  Stent assisted endovascular repair of right middle cerebral artery aneurysm  3.  An approximately 1 mm sized middle cerebral aneurysm at the origin of right anterior temporal artery. No endovascular intervention was performed.  4.  An approximately 5 mm sized wide necked aneurysm at the left cavernous segment of the internal carotid artery. No endovascular intervention was performed.        MRA on October 16, 2017 at [facility]:  1.  11 mm sized aneurysm from the ophthalmic segment of the right internal carotid artery.  2.  8 x 5 mm sized wide necked aneurysm from the left cavernous segment of the internal carotid artery.  3.  5 x 3 mm sized aneurysm of the right middle cerebral artery bifurcation.          Current Outpatient Prescriptions   Medication Sig Dispense Refill   • atorvastatin (LIPITOR) 40 MG Tab Take 1 Tab by mouth every bedtime. 90 Tab 1   • amLODIPine (NORVASC) 10 MG Tab TAKE 1 TABLET BY MOUTH EVERY DAY 30 Tab 0   • clopidogrel (PLAVIX) 75 MG Tab TAKE 1 TAB BY MOUTH EVERY DAY. 90 Tab 0   • Ascorbic Acid (VITAMIN C CR) 1000 MG Tab CR Take 1 Tab by mouth every day.     • Cholecalciferol (VITAMIN D3) 2000 UNIT CAPS Take 1 Cap by mouth every day.     • Multiple Vitamin (VITAMIN LIQUID PO) Take 30 mL by mouth every day.       No current facility-administered medications for this encounter.        Allergies   Allergen Reactions   • Bee Anaphylaxis   • Nsaids Swelling     2 trips to ER w reactions of rash swelling with difficulty breathing.   • Codeine Nausea     dizzy   • Keflex Vomiting     No trouble with amoxil       Physical Exam:  General Appearance: healthy, alert, no distress, cooperative  Skin: negatives: color normal, vascularity  normal, temperature normal, positives: ecchymoses - scattered and hands, small  Eyes: negative findings: conjunctivae and sclerae normal and pupils equal, round, reactive to light and accomodation  Lungs: negative findings: normal respiratory rate and rhythm  Extremities: negative findings: normal color bilateral feet, no discoloration noted  Neurologic: intact    Impression:   1. Unruptured left internal carotid artery aneurysm 7.5 mm in size, amenable to intervention with flow diverter.   2. Unruptured right middle cerebral artery aneurysm 4-5 mm in size, status post stent assisted coil embolization.  3. Unruptured right internal carotid artery aneurysm 11 mm in size, status post coil embolization.  4. 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   5. Right femoral artery post angiography occlusion, status post endarterectomy and patch angioplasty  6. Family history of aneurysm rupture.  7. Hypertension, controlled.  8. Peripheral arterial disease, on Plavix and aspirin.  9. Hyperlipidemia.    Plan:   Janes Rivero MD has reviewed 's history and imaging studies, examined the patient, and discussed treatment options.  has largely recovered from the coiling of 2 right aneurysms. The headaches and fatigue are mild and we expect her to be at baseline within another week or two. She has had follow up with Dr. Durbin for the femoral access and is doing well without leg claudication. She has another aneurysm in the left ICA that is largely extradural but a portion of the aneurysm sac may have an intradural component. In the setting of her family history, our recommendation is to proceed with intervention and she is in agreement. This aneurysm is amenable to treatment with the Pipeline Flow Diverter.     We again discussed the method of the procedure at length including the possibility of the use of stents or balloons to  facilitate aneurysm coiling and associated risks of those devices. We explained that intracranial stents are Humanitarian Use Devices and effectiveness of those devices have not been demonstrated and what that means to the patient. We explained that while the flow diverter is our likely plan to treat the left ICA aneurysm, we may ultimately need to treat it with a stent assisted coiling. We additionally discussed the procedure risks, including bleeding and infection, damage to the arteries, reaction to any medications given during the procedure, side effects of contrast, radiation exposure, in stent stenosis, coil or stent migration, mechanical failure, stroke, hemorrhage, and death. There is a chance the aneurysm may ultimately not be amenable to endovascular intervention. After embolization, there is a chance that the aneurysm could recur. We discussed alternatives of the procedure including surveillance with no intervention. The patient verbalizes understanding of the plan and elects to proceed. She would like to wait until the end of summer to treat this aneurysm. We explained that this aneurysm carries a 5 year rupture risk of 2.6% and waiting a couple of months is an acceptable risk. We discussed the need for her to continue Plavix for the intracranial stent that has been placed as well as for 6 months post procedure after the flow diverter is placed. She is unable to take aspirin. She and her  are in agreement with the plan and she will be scheduled for the procedure in August or September 2018. She may contact us if she changes her mind and wishes to schedule sooner.        LUIZ Bridges with Janes Rivero MD  Neuro Interventional Service   [facility]   [address] [facility] [place] [address])  [place] [id number]  [phone]

## 2018-06-12 ENCOUNTER — TELEPHONE (OUTPATIENT)
Dept: MEDICAL GROUP | Facility: LAB | Age: 68
End: 2018-06-12

## 2018-06-12 NOTE — TELEPHONE ENCOUNTER
PVP WITH OUTREACH  Future Appointments       Provider Department Center    6/18/2018 11:00 AM ALETA Mahajan; [facility]  [facility]           ANNUAL WELLNESS VISIT PRE-VISIT PLANNING     1.  Immunizations were updated in Epic using WebIZ?: Epic matches WebIZ       •  WebIZ Recommendations: TDAP       •  Is patient due for Tdap? YES. Patient was notified of copay/out of pocket cost.       •  Is patient due for Shingles? NO     2.  Specialty Comments was updated with diagnosis information provided by SCP: YES MDX printed.

## 2018-06-18 ENCOUNTER — OFFICE VISIT (OUTPATIENT)
Dept: MEDICAL GROUP | Facility: LAB | Age: 68
End: 2018-06-18
Payer: MEDICARE

## 2018-06-18 VITALS
DIASTOLIC BLOOD PRESSURE: 68 MMHG | OXYGEN SATURATION: 97 % | SYSTOLIC BLOOD PRESSURE: 118 MMHG | BODY MASS INDEX: 16.65 KG/M2 | HEART RATE: 88 BPM | HEIGHT: 61 IN | WEIGHT: 88.2 LBS | TEMPERATURE: 99 F | RESPIRATION RATE: 12 BRPM

## 2018-06-18 DIAGNOSIS — Z87.442 HISTORY OF KIDNEY STONES: ICD-10-CM

## 2018-06-18 DIAGNOSIS — Z00.00 MEDICARE ANNUAL WELLNESS VISIT, SUBSEQUENT: ICD-10-CM

## 2018-06-18 DIAGNOSIS — E78.5 DYSLIPIDEMIA: ICD-10-CM

## 2018-06-18 DIAGNOSIS — I73.9 PAD (PERIPHERAL ARTERY DISEASE) (HCC): ICD-10-CM

## 2018-06-18 DIAGNOSIS — Z87.898 HISTORY OF SEIZURES: ICD-10-CM

## 2018-06-18 DIAGNOSIS — I67.1 ANEURYSM OF MIDDLE CEREBRAL ARTERY: ICD-10-CM

## 2018-06-18 DIAGNOSIS — I67.1 ANEURYSM OF INTERNAL CAROTID ARTERY: ICD-10-CM

## 2018-06-18 DIAGNOSIS — Z12.12 SCREENING FOR COLORECTAL CANCER: ICD-10-CM

## 2018-06-18 DIAGNOSIS — F32.9 REACTIVE DEPRESSION: ICD-10-CM

## 2018-06-18 DIAGNOSIS — L24.89 IRRITANT CONTACT DERMATITIS DUE TO OTHER AGENTS: ICD-10-CM

## 2018-06-18 DIAGNOSIS — Z72.0 TOBACCO ABUSE: ICD-10-CM

## 2018-06-18 DIAGNOSIS — I10 ESSENTIAL HYPERTENSION: ICD-10-CM

## 2018-06-18 DIAGNOSIS — Z91.030 H/O BEE STING ALLERGY: ICD-10-CM

## 2018-06-18 DIAGNOSIS — R92.30 DENSE BREAST TISSUE: ICD-10-CM

## 2018-06-18 DIAGNOSIS — I72.4 ANEURYSM OF ARTERY OF LOWER EXTREMITY (HCC): ICD-10-CM

## 2018-06-18 DIAGNOSIS — I70.201 ATHEROSCLEROSIS OF NATIVE ARTERY OF RIGHT LOWER EXTREMITY, WITH UNSPECIFIED PRESENCE OF CLINICAL MANIFESTATION (HCC): ICD-10-CM

## 2018-06-18 DIAGNOSIS — I70.201 FEMORAL ARTERY OCCLUSION, RIGHT (HCC): ICD-10-CM

## 2018-06-18 DIAGNOSIS — Z86.69 HX OF GUILLAIN-BARRE SYNDROME: ICD-10-CM

## 2018-06-18 DIAGNOSIS — Z12.11 SCREENING FOR COLORECTAL CANCER: ICD-10-CM

## 2018-06-18 DIAGNOSIS — R79.89 ABNORMAL TSH: ICD-10-CM

## 2018-06-18 DIAGNOSIS — I65.23 BILATERAL CAROTID ARTERY STENOSIS: ICD-10-CM

## 2018-06-18 PROBLEM — I72.9 ANEURYSM (HCC): Status: RESOLVED | Noted: 2018-01-16 | Resolved: 2018-06-18

## 2018-06-18 PROCEDURE — G0439 PPPS, SUBSEQ VISIT: HCPCS | Performed by: NURSE PRACTITIONER

## 2018-06-18 ASSESSMENT — PATIENT HEALTH QUESTIONNAIRE - PHQ9
SUM OF ALL RESPONSES TO PHQ QUESTIONS 1-9: 5
5. POOR APPETITE OR OVEREATING: 0 - NOT AT ALL
CLINICAL INTERPRETATION OF PHQ2 SCORE: 1

## 2018-06-18 ASSESSMENT — ENCOUNTER SYMPTOMS: GENERAL WELL-BEING: EXCELLENT

## 2018-06-18 ASSESSMENT — ACTIVITIES OF DAILY LIVING (ADL): BATHING_REQUIRES_ASSISTANCE: 0

## 2018-07-02 NOTE — PROGRESS NOTES
Reina Gandara was admitted to [facility] on 5/25/18 for a repair of an aneurysm, preformed by Dr. Rivero. Patient discharged home on 5/26/18. [facility] patient advocate was able to successfully engage with patient post-discharge. Per discharge orders, patient was instructed to see her surgeon and also see Dr. Carina Durbin. Patient kept both appointments. [facility] assisted patient by informing her of additional discharge instructions to follow up with Interventional Radiology Dr. Rivero and the appointment was scheduled and kept.

## 2018-07-14 ENCOUNTER — HOSPITAL ENCOUNTER (OUTPATIENT)
Dept: RADIOLOGY | Facility: MEDICAL CENTER | Age: 68
End: 2018-07-14
Attending: NURSE PRACTITIONER
Payer: MEDICARE

## 2018-07-14 DIAGNOSIS — R92.30 DENSE BREAST TISSUE: ICD-10-CM

## 2018-07-14 DIAGNOSIS — I10 ESSENTIAL HYPERTENSION: ICD-10-CM

## 2018-07-14 PROCEDURE — 76641 ULTRASOUND BREAST COMPLETE: CPT

## 2018-07-16 DIAGNOSIS — R92.8 ABNORMAL FINDING ON BREAST IMAGING: ICD-10-CM

## 2018-07-16 NOTE — TELEPHONE ENCOUNTER
Was the patient seen in the last year in this department? Yes lov 6/18/18    Does patient have an active prescription for medications requested? No     Received Request Via: Pharmacy

## 2018-07-17 RX ORDER — AMLODIPINE BESYLATE 10 MG/1
TABLET ORAL
Qty: 90 TAB | Refills: 2 | Status: ON HOLD | OUTPATIENT
Start: 2018-07-17 | End: 2018-10-22

## 2018-07-22 DIAGNOSIS — I73.9 PAD (PERIPHERAL ARTERY DISEASE) (HCC): ICD-10-CM

## 2018-07-23 ENCOUNTER — HOSPITAL ENCOUNTER (OUTPATIENT)
Dept: RADIOLOGY | Facility: MEDICAL CENTER | Age: 68
End: 2018-07-23
Attending: NURSE PRACTITIONER
Payer: MEDICARE

## 2018-07-23 DIAGNOSIS — R92.8 ABNORMAL FINDING ON BREAST IMAGING: ICD-10-CM

## 2018-07-23 PROCEDURE — 76642 ULTRASOUND BREAST LIMITED: CPT | Mod: LT

## 2018-07-23 PROCEDURE — G0279 TOMOSYNTHESIS, MAMMO: HCPCS

## 2018-07-24 RX ORDER — CLOPIDOGREL BISULFATE 75 MG/1
75 TABLET ORAL
Qty: 90 TAB | Refills: 1 | Status: SHIPPED | OUTPATIENT
Start: 2018-07-24 | End: 2019-01-17

## 2018-07-26 ENCOUNTER — PATIENT OUTREACH (OUTPATIENT)
Dept: HEALTH INFORMATION MANAGEMENT | Facility: OTHER | Age: 68
End: 2018-07-26

## 2018-08-16 ENCOUNTER — TELEPHONE (OUTPATIENT)
Dept: RADIOLOGY | Facility: MEDICAL CENTER | Age: 68
End: 2018-08-16

## 2018-09-24 ENCOUNTER — TELEPHONE (OUTPATIENT)
Dept: RADIOLOGY | Facility: MEDICAL CENTER | Age: 68
End: 2018-09-24

## 2018-09-24 NOTE — TELEPHONE ENCOUNTER
Pt contacted APRN with concerns about progressive memory loss, asking if it is related to coiling procedure or aneurysm. Explained that the memory loss was the reason for the workup in 2017 and that the aneurysms are a separate, incidental finding. Advised that she review her medication list including OTC meds with her pharmacist and return to PCP for her memory complaints.

## 2018-10-18 DIAGNOSIS — Z01.812 PRE-OPERATIVE LABORATORY EXAMINATION: ICD-10-CM

## 2018-10-18 DIAGNOSIS — Z01.811 PRE-OPERATIVE RESPIRATORY EXAMINATION: ICD-10-CM

## 2018-10-18 DIAGNOSIS — Z01.810 PRE-OPERATIVE CARDIOVASCULAR EXAMINATION: ICD-10-CM

## 2018-10-18 LAB
ANION GAP SERPL CALC-SCNC: 8 MMOL/L (ref 0–11.9)
BASOPHILS # BLD AUTO: 0.5 % (ref 0–1.8)
BASOPHILS # BLD: 0.05 K/UL (ref 0–0.12)
BUN SERPL-MCNC: 13 MG/DL (ref 8–22)
CALCIUM SERPL-MCNC: 10.2 MG/DL (ref 8.5–10.5)
CHLORIDE SERPL-SCNC: 104 MMOL/L (ref 96–112)
CO2 SERPL-SCNC: 30 MMOL/L (ref 20–33)
CREAT SERPL-MCNC: 0.52 MG/DL (ref 0.5–1.4)
EKG IMPRESSION: NORMAL
EOSINOPHIL # BLD AUTO: 0.05 K/UL (ref 0–0.51)
EOSINOPHIL NFR BLD: 0.5 % (ref 0–6.9)
ERYTHROCYTE [DISTWIDTH] IN BLOOD BY AUTOMATED COUNT: 47.5 FL (ref 35.9–50)
GLUCOSE SERPL-MCNC: 96 MG/DL (ref 65–99)
HCT VFR BLD AUTO: 41 % (ref 37–47)
HGB BLD-MCNC: 13.6 G/DL (ref 12–16)
IMM GRANULOCYTES # BLD AUTO: 0.04 K/UL (ref 0–0.11)
IMM GRANULOCYTES NFR BLD AUTO: 0.4 % (ref 0–0.9)
INR PPP: 0.98 (ref 0.87–1.13)
LYMPHOCYTES # BLD AUTO: 2.33 K/UL (ref 1–4.8)
LYMPHOCYTES NFR BLD: 24.7 % (ref 22–41)
MCH RBC QN AUTO: 32.3 PG (ref 27–33)
MCHC RBC AUTO-ENTMCNC: 33.2 G/DL (ref 33.6–35)
MCV RBC AUTO: 97.4 FL (ref 81.4–97.8)
MONOCYTES # BLD AUTO: 0.48 K/UL (ref 0–0.85)
MONOCYTES NFR BLD AUTO: 5.1 % (ref 0–13.4)
NEUTROPHILS # BLD AUTO: 6.49 K/UL (ref 2–7.15)
NEUTROPHILS NFR BLD: 68.8 % (ref 44–72)
NRBC # BLD AUTO: 0 K/UL
NRBC BLD-RTO: 0 /100 WBC
PLATELET # BLD AUTO: 295 K/UL (ref 164–446)
PMV BLD AUTO: 9.7 FL (ref 9–12.9)
POTASSIUM SERPL-SCNC: 3.7 MMOL/L (ref 3.6–5.5)
PROTHROMBIN TIME: 13 SEC (ref 12–14.6)
RBC # BLD AUTO: 4.21 M/UL (ref 4.2–5.4)
SODIUM SERPL-SCNC: 142 MMOL/L (ref 135–145)
WBC # BLD AUTO: 9.4 K/UL (ref 4.8–10.8)

## 2018-10-18 PROCEDURE — 93010 ELECTROCARDIOGRAM REPORT: CPT | Performed by: INTERNAL MEDICINE

## 2018-10-18 PROCEDURE — 36415 COLL VENOUS BLD VENIPUNCTURE: CPT

## 2018-10-18 PROCEDURE — 85025 COMPLETE CBC W/AUTO DIFF WBC: CPT

## 2018-10-18 PROCEDURE — 80048 BASIC METABOLIC PNL TOTAL CA: CPT

## 2018-10-18 PROCEDURE — 85610 PROTHROMBIN TIME: CPT

## 2018-10-18 PROCEDURE — 93005 ELECTROCARDIOGRAM TRACING: CPT

## 2018-10-18 RX ORDER — PAROXETINE 10 MG/1
TABLET, FILM COATED ORAL
Qty: 90 TAB | Refills: 1 | Status: ON HOLD | OUTPATIENT
Start: 2018-10-18 | End: 2019-01-17

## 2018-10-18 RX ORDER — ATORVASTATIN CALCIUM 40 MG/1
TABLET, FILM COATED ORAL
Qty: 90 TAB | Refills: 1 | Status: SHIPPED | OUTPATIENT
Start: 2018-10-18 | End: 2019-10-23

## 2018-10-22 ENCOUNTER — APPOINTMENT (OUTPATIENT)
Dept: RADIOLOGY | Facility: MEDICAL CENTER | Age: 68
DRG: 027 | End: 2018-10-22
Attending: RADIOLOGY
Payer: MEDICARE

## 2018-10-22 ENCOUNTER — HOSPITAL ENCOUNTER (INPATIENT)
Facility: MEDICAL CENTER | Age: 68
LOS: 1 days | DRG: 027 | End: 2018-10-23
Attending: RADIOLOGY | Admitting: RADIOLOGY
Payer: MEDICARE

## 2018-10-22 DIAGNOSIS — I67.1 INTRACRANIAL ANEURYSM: ICD-10-CM

## 2018-10-22 LAB
ACT BLD: 230 SEC (ref 74–137)
CFT BLD TEG: 10.5 MIN (ref 5–10)
CLOT ANGLE BLD TEG: 56.3 DEGREES (ref 53–72)
CLOT LYSIS 30M P MA LENFR BLD TEG: 0.2 % (ref 0–8)
CT.EXTRINSIC BLD ROTEM: 2.4 MIN (ref 1–3)
MCF BLD TEG: 58.3 MM (ref 50–70)
PA AA BLD-ACNC: 0 %
PA ADP BLD-ACNC: 0 %
TEG ALGORITHM TGALG: ABNORMAL

## 2018-10-22 PROCEDURE — A9270 NON-COVERED ITEM OR SERVICE: HCPCS

## 2018-10-22 PROCEDURE — 700117 HCHG RX CONTRAST REV CODE 255: Performed by: RADIOLOGY

## 2018-10-22 PROCEDURE — 700111 HCHG RX REV CODE 636 W/ 250 OVERRIDE (IP)

## 2018-10-22 PROCEDURE — 700102 HCHG RX REV CODE 250 W/ 637 OVERRIDE(OP): Performed by: INTERNAL MEDICINE

## 2018-10-22 PROCEDURE — 85384 FIBRINOGEN ACTIVITY: CPT

## 2018-10-22 PROCEDURE — 700102 HCHG RX REV CODE 250 W/ 637 OVERRIDE(OP)

## 2018-10-22 PROCEDURE — A9270 NON-COVERED ITEM OR SERVICE: HCPCS | Performed by: INTERNAL MEDICINE

## 2018-10-22 PROCEDURE — 99223 1ST HOSP IP/OBS HIGH 75: CPT | Performed by: INTERNAL MEDICINE

## 2018-10-22 PROCEDURE — 85347 COAGULATION TIME ACTIVATED: CPT

## 2018-10-22 PROCEDURE — 160002 HCHG RECOVERY MINUTES (STAT)

## 2018-10-22 PROCEDURE — A9270 NON-COVERED ITEM OR SERVICE: HCPCS | Performed by: RADIOLOGY

## 2018-10-22 PROCEDURE — 03VL3DZ RESTRICTION OF LEFT INTERNAL CAROTID ARTERY WITH INTRALUMINAL DEVICE, PERCUTANEOUS APPROACH: ICD-10-PCS | Performed by: RADIOLOGY

## 2018-10-22 PROCEDURE — 770022 HCHG ROOM/CARE - ICU (200)

## 2018-10-22 PROCEDURE — 700102 HCHG RX REV CODE 250 W/ 637 OVERRIDE(OP): Performed by: RADIOLOGY

## 2018-10-22 PROCEDURE — 700105 HCHG RX REV CODE 258: Performed by: INTERNAL MEDICINE

## 2018-10-22 PROCEDURE — 61624 TCAT PERM OCCLS/EMBOLJ CNS: CPT

## 2018-10-22 PROCEDURE — 700101 HCHG RX REV CODE 250

## 2018-10-22 PROCEDURE — 85576 BLOOD PLATELET AGGREGATION: CPT | Mod: 91

## 2018-10-22 PROCEDURE — B3181ZZ FLUOROSCOPY OF BILATERAL INTERNAL CAROTID ARTERIES USING LOW OSMOLAR CONTRAST: ICD-10-PCS | Performed by: RADIOLOGY

## 2018-10-22 RX ORDER — ONDANSETRON 2 MG/ML
4 INJECTION INTRAMUSCULAR; INTRAVENOUS EVERY 4 HOURS PRN
Status: DISCONTINUED | OUTPATIENT
Start: 2018-10-22 | End: 2018-10-23 | Stop reason: HOSPADM

## 2018-10-22 RX ORDER — ATORVASTATIN CALCIUM 40 MG/1
40 TABLET, FILM COATED ORAL
Status: DISCONTINUED | OUTPATIENT
Start: 2018-10-22 | End: 2018-10-23 | Stop reason: HOSPADM

## 2018-10-22 RX ORDER — HEPARIN SODIUM,PORCINE 1000/ML
VIAL (ML) INJECTION
Status: DISPENSED
Start: 2018-10-22 | End: 2018-10-22

## 2018-10-22 RX ORDER — CLOPIDOGREL BISULFATE 75 MG/1
75 TABLET ORAL DAILY
Status: DISCONTINUED | OUTPATIENT
Start: 2018-10-22 | End: 2018-10-23 | Stop reason: HOSPADM

## 2018-10-22 RX ORDER — ACETAMINOPHEN 325 MG/1
650 TABLET ORAL EVERY 6 HOURS PRN
Status: DISCONTINUED | OUTPATIENT
Start: 2018-10-22 | End: 2018-10-23 | Stop reason: HOSPADM

## 2018-10-22 RX ORDER — SODIUM CHLORIDE, SODIUM LACTATE, POTASSIUM CHLORIDE, CALCIUM CHLORIDE 600; 310; 30; 20 MG/100ML; MG/100ML; MG/100ML; MG/100ML
INJECTION, SOLUTION INTRAVENOUS CONTINUOUS
Status: DISCONTINUED | OUTPATIENT
Start: 2018-10-22 | End: 2018-10-22 | Stop reason: HOSPADM

## 2018-10-22 RX ORDER — OXYCODONE HYDROCHLORIDE 5 MG/1
5 TABLET ORAL
Status: DISCONTINUED | OUTPATIENT
Start: 2018-10-22 | End: 2018-10-22 | Stop reason: HOSPADM

## 2018-10-22 RX ORDER — BISACODYL 10 MG
10 SUPPOSITORY, RECTAL RECTAL
Status: DISCONTINUED | OUTPATIENT
Start: 2018-10-22 | End: 2018-10-23 | Stop reason: HOSPADM

## 2018-10-22 RX ORDER — DIPHENHYDRAMINE HYDROCHLORIDE 50 MG/ML
12.5 INJECTION INTRAMUSCULAR; INTRAVENOUS
Status: DISCONTINUED | OUTPATIENT
Start: 2018-10-22 | End: 2018-10-22 | Stop reason: HOSPADM

## 2018-10-22 RX ORDER — OXYCODONE HCL 5 MG/5 ML
10 SOLUTION, ORAL ORAL
Status: DISCONTINUED | OUTPATIENT
Start: 2018-10-22 | End: 2018-10-22 | Stop reason: HOSPADM

## 2018-10-22 RX ORDER — SODIUM CHLORIDE, SODIUM LACTATE, POTASSIUM CHLORIDE, CALCIUM CHLORIDE 600; 310; 30; 20 MG/100ML; MG/100ML; MG/100ML; MG/100ML
1000 INJECTION, SOLUTION INTRAVENOUS
Status: COMPLETED | OUTPATIENT
Start: 2018-10-22 | End: 2018-10-22

## 2018-10-22 RX ORDER — ONDANSETRON 4 MG/1
4 TABLET, ORALLY DISINTEGRATING ORAL EVERY 4 HOURS PRN
Status: DISCONTINUED | OUTPATIENT
Start: 2018-10-22 | End: 2018-10-23 | Stop reason: HOSPADM

## 2018-10-22 RX ORDER — LIDOCAINE HYDROCHLORIDE 10 MG/ML
INJECTION, SOLUTION INFILTRATION; PERINEURAL
Status: COMPLETED
Start: 2018-10-22 | End: 2018-10-22

## 2018-10-22 RX ORDER — MIDAZOLAM HYDROCHLORIDE 1 MG/ML
INJECTION INTRAMUSCULAR; INTRAVENOUS
Status: DISPENSED
Start: 2018-10-22 | End: 2018-10-22

## 2018-10-22 RX ORDER — ONDANSETRON 2 MG/ML
4 INJECTION INTRAMUSCULAR; INTRAVENOUS
Status: DISCONTINUED | OUTPATIENT
Start: 2018-10-22 | End: 2018-10-22 | Stop reason: HOSPADM

## 2018-10-22 RX ORDER — POLYETHYLENE GLYCOL 3350 17 G/17G
1 POWDER, FOR SOLUTION ORAL
Status: DISCONTINUED | OUTPATIENT
Start: 2018-10-22 | End: 2018-10-23 | Stop reason: HOSPADM

## 2018-10-22 RX ORDER — OXYCODONE HYDROCHLORIDE 5 MG/1
10 TABLET ORAL
Status: DISCONTINUED | OUTPATIENT
Start: 2018-10-22 | End: 2018-10-22 | Stop reason: HOSPADM

## 2018-10-22 RX ORDER — OXYCODONE HCL 5 MG/5 ML
5 SOLUTION, ORAL ORAL
Status: DISCONTINUED | OUTPATIENT
Start: 2018-10-22 | End: 2018-10-22 | Stop reason: HOSPADM

## 2018-10-22 RX ORDER — AMOXICILLIN 250 MG
2 CAPSULE ORAL 2 TIMES DAILY
Status: DISCONTINUED | OUTPATIENT
Start: 2018-10-22 | End: 2018-10-23 | Stop reason: HOSPADM

## 2018-10-22 RX ORDER — ASPIRIN 325 MG
TABLET ORAL
Status: COMPLETED
Start: 2018-10-22 | End: 2018-10-22

## 2018-10-22 RX ORDER — ASPIRIN 325 MG
325 TABLET ORAL ONCE
Status: COMPLETED | OUTPATIENT
Start: 2018-10-22 | End: 2018-10-22

## 2018-10-22 RX ORDER — M-VIT,TX,IRON,MINS/CALC/FOLIC 27MG-0.4MG
1 TABLET ORAL DAILY
COMMUNITY

## 2018-10-22 RX ORDER — SODIUM CHLORIDE 9 MG/ML
INJECTION, SOLUTION INTRAVENOUS CONTINUOUS
Status: DISCONTINUED | OUTPATIENT
Start: 2018-10-22 | End: 2018-10-23 | Stop reason: HOSPADM

## 2018-10-22 RX ORDER — HALOPERIDOL 5 MG/ML
1 INJECTION INTRAMUSCULAR
Status: DISCONTINUED | OUTPATIENT
Start: 2018-10-22 | End: 2018-10-22 | Stop reason: HOSPADM

## 2018-10-22 RX ORDER — CLINDAMYCIN PHOSPHATE 150 MG/ML
INJECTION, SOLUTION INTRAVENOUS
Status: DISPENSED
Start: 2018-10-22 | End: 2018-10-22

## 2018-10-22 RX ORDER — CLOPIDOGREL BISULFATE 75 MG/1
75 TABLET ORAL
Status: DISCONTINUED | OUTPATIENT
Start: 2018-10-22 | End: 2018-10-22

## 2018-10-22 RX ADMIN — LIDOCAINE HYDROCHLORIDE 2 ML: 10 INJECTION, SOLUTION INFILTRATION; PERINEURAL at 07:45

## 2018-10-22 RX ADMIN — SODIUM CHLORIDE, SODIUM LACTATE, POTASSIUM CHLORIDE, CALCIUM CHLORIDE 1000 ML: 600; 310; 30; 20 INJECTION, SOLUTION INTRAVENOUS at 07:49

## 2018-10-22 RX ADMIN — IOHEXOL 170 ML: 300 INJECTION, SOLUTION INTRAVENOUS at 14:00

## 2018-10-22 RX ADMIN — SODIUM CHLORIDE: 9 INJECTION, SOLUTION INTRAVENOUS at 12:34

## 2018-10-22 RX ADMIN — ASPIRIN 325 MG: 325 TABLET, FILM COATED ORAL at 08:35

## 2018-10-22 RX ADMIN — Medication 325 MG: at 08:35

## 2018-10-22 RX ADMIN — CLOPIDOGREL 75 MG: 75 TABLET, FILM COATED ORAL at 12:54

## 2018-10-22 RX ADMIN — STANDARDIZED SENNA CONCENTRATE AND DOCUSATE SODIUM 2 TABLET: 8.6; 5 TABLET ORAL at 17:55

## 2018-10-22 RX ADMIN — ACETAMINOPHEN 650 MG: 325 TABLET, FILM COATED ORAL at 20:12

## 2018-10-22 RX ADMIN — STANDARDIZED SENNA CONCENTRATE AND DOCUSATE SODIUM 2 TABLET: 8.6; 5 TABLET ORAL at 12:51

## 2018-10-22 RX ADMIN — ATORVASTATIN CALCIUM 40 MG: 40 TABLET, FILM COATED ORAL at 20:12

## 2018-10-22 ASSESSMENT — ENCOUNTER SYMPTOMS
INSOMNIA: 0
FEVER: 0
DEPRESSION: 0
SHORTNESS OF BREATH: 0
WEIGHT LOSS: 0
EYE REDNESS: 0
BLURRED VISION: 0
NAUSEA: 0
BACK PAIN: 0
EYE DISCHARGE: 0
SEIZURES: 0
DIZZINESS: 0
ABDOMINAL PAIN: 0
SPUTUM PRODUCTION: 0
NERVOUS/ANXIOUS: 0
STRIDOR: 0
PALPITATIONS: 0
FOCAL WEAKNESS: 0
ORTHOPNEA: 0
DIARRHEA: 0
COUGH: 0
VOMITING: 0
HEADACHES: 0
MYALGIAS: 0
HEARTBURN: 0
CHILLS: 0
NECK PAIN: 0
EYE PAIN: 0

## 2018-10-22 ASSESSMENT — PAIN SCALES - GENERAL
PAINLEVEL_OUTOF10: 0

## 2018-10-22 ASSESSMENT — COGNITIVE AND FUNCTIONAL STATUS - GENERAL
WALKING IN HOSPITAL ROOM: A LITTLE
SUGGESTED CMS G CODE MODIFIER MOBILITY: CJ
STANDING UP FROM CHAIR USING ARMS: A LITTLE
SUGGESTED CMS G CODE MODIFIER DAILY ACTIVITY: CH
DAILY ACTIVITIY SCORE: 24
MOVING FROM LYING ON BACK TO SITTING ON SIDE OF FLAT BED: A LITTLE
CLIMB 3 TO 5 STEPS WITH RAILING: A LITTLE
MOBILITY SCORE: 20

## 2018-10-22 ASSESSMENT — LIFESTYLE VARIABLES
HAVE YOU EVER FELT YOU SHOULD CUT DOWN ON YOUR DRINKING: NO
ALCOHOL_USE: YES
ON A TYPICAL DAY WHEN YOU DRINK ALCOHOL HOW MANY DRINKS DO YOU HAVE: 1
EVER FELT BAD OR GUILTY ABOUT YOUR DRINKING: NO
AVERAGE NUMBER OF DAYS PER WEEK YOU HAVE A DRINK CONTAINING ALCOHOL: 3
CONSUMPTION TOTAL: NEGATIVE
TOTAL SCORE: 0
HAVE PEOPLE ANNOYED YOU BY CRITICIZING YOUR DRINKING: NO
TOTAL SCORE: 0
HOW MANY TIMES IN THE PAST YEAR HAVE YOU HAD 5 OR MORE DRINKS IN A DAY: 0
TOTAL SCORE: 0
EVER HAD A DRINK FIRST THING IN THE MORNING TO STEADY YOUR NERVES TO GET RID OF A HANGOVER: NO

## 2018-10-22 ASSESSMENT — PATIENT HEALTH QUESTIONNAIRE - PHQ9
1. LITTLE INTEREST OR PLEASURE IN DOING THINGS: NOT AT ALL
2. FEELING DOWN, DEPRESSED, IRRITABLE, OR HOPELESS: NOT AT ALL
SUM OF ALL RESPONSES TO PHQ9 QUESTIONS 1 AND 2: 0

## 2018-10-22 NOTE — ASSESSMENT & PLAN NOTE
Post Left ICA flow diverter placement  Monitor neuro checks closely in ICU  To start on aspirin and Plavix this afternoon per Dr. Tracy  If she continued to be stable by tomorrow she can be discharged home and follow-up with Dr. Ospina as an outpatient.

## 2018-10-22 NOTE — H&P
Hospital Medicine History and Physical      Date of Service  10/22/2018    Chief Complaint  ICA aneurysm left    History of Presenting Illness  Juliocesar is a 68 y.o. female PMH of left internal carotid artery aneurysm , who was admitted to ICU after Left ICA flow diverter placement by Dr. Tracy earlier today.  The patient was seen and examined in E ICU.  The patient denies any headache, any neurological deficit.  He has been feeling well.  Her vitals are stable.  She will be monitored closely for neuro check every 1 hour in ICU for next 24 hours.  If the patient develops any neurological deficit to inform MD right away and get CT scan of the head immediately.    Primary Care Physician  Jessica Gentile M.D.      Code Status  Full code    Review of Systems  Review of Systems   Constitutional: Negative for chills, fever and weight loss.   HENT: Negative for congestion and nosebleeds.    Eyes: Negative for blurred vision, pain, discharge and redness.   Respiratory: Negative for cough, sputum production, shortness of breath and stridor.    Cardiovascular: Negative for chest pain, palpitations and orthopnea.   Gastrointestinal: Negative for abdominal pain, diarrhea, heartburn, nausea and vomiting.   Genitourinary: Negative for dysuria, frequency and urgency.   Musculoskeletal: Negative for back pain, myalgias and neck pain.   Skin: Negative for itching and rash.   Neurological: Negative for dizziness, focal weakness, seizures and headaches.   Psychiatric/Behavioral: Negative for depression. The patient is not nervous/anxious and does not have insomnia.      Please see HPI, all other systems were reviewed and are negative (AMA/CMS criteria)     Past Medical History  Past Medical History:   Diagnosis Date   • Contact dermatitis due to cosmetics 9/16/2015   • PAD (peripheral artery disease) 5/7/2014   • Reactive depression 9/17/2013   • Seizure (HCC) 1988    unknown cause   • Environmental and seasonal allergies    •  Guillain Barré syndrome (HCC)     Hx    • Hemorrhagic disorder (HCC)     plavix   • History of kidney stones     x2 episodes   • History of seizures     x2 episodes - no treatment needed   • Hyperlipidemia    • Hypertension    • Intracranial aneurysm    • Psychiatric problem     depression   • Renal disorder     stones       Surgical History  Past Surgical History:   Procedure Laterality Date   • THROMBECTOMY Right 5/25/2018    Procedure: FEMORAL ENDARTERECTOMY, ANGIOPLASTY WITH PATCH, FOREIGN BODY REMOVAL;  Surgeon: Carina Durbin M.D.;  Location: SURGERY [facility];  Service: General   • RECOVERY  10/4/2013    Performed by Ir-Recovery Surgery at SURGERY SAME DAY [facility]   • FEMORAL POPLITEAL BYPASS  7/7/2013    Performed by Carina Durbin M.D. at SURGERY [facility]   • RECOVERY  7/6/2013    Performed by Ir[facility]Recovery Surgery at [facility]   • ABDOMINAL HYSTERECTOMY TOTAL  1981    w/o BSO due to fibroids   • APPENDECTOMY      during hysterectomy   • OTHER      neuroma removed from feet 4-5x   • OTHER      abdominal stent   • TONSILLECTOMY     • WRIST ORIF         Medications  No current facility-administered medications on file prior to encounter.      Current Outpatient Prescriptions on File Prior to Encounter   Medication Sig Dispense Refill   • clopidogrel (PLAVIX) 75 MG Tab TAKE 1 TAB BY MOUTH EVERY DAY. 90 Tab 1   • Ascorbic Acid (VITAMIN C CR) 1000 MG Tab CR Take 1 Tab by mouth every day.       Family History  Family History   Problem Relation Age of Onset   • Other Sister         tremors / instability   • Lung Disease Sister         COPD    • Hyperlipidemia Sister    • Other Paternal Grandfather         brain aneurysm         Social History  Social History   Substance Use Topics   • Smoking status: Light Tobacco Smoker     Packs/day: 0.25     Years: 50.00     Types: Cigarettes   • Smokeless tobacco: Never Used      Comment: 1/2 ppd down to 4 cig/day since 4/13, down to 2  cig/day since    • Alcohol use 0.6 oz/week     1 Glasses of wine per week      Comment: 3/week       Allergies  Allergies   Allergen Reactions   • Bee Anaphylaxis   • Nsaids Swelling     2 trips to ER w reactions of rash swelling with difficulty breathing.   • Codeine Nausea     dizzy   • Keflex Vomiting     No trouble with amoxil        Physical Exam  Laboratory   Hemodynamics  Temp (24hrs), Av.9 °C (98.5 °F), Min:36.9 °C (98.5 °F), Max:36.9 °C (98.5 °F)   Temperature: 36.9 °C (98.5 °F)  Pulse  Av  Min: 72  Max: 72    Blood Pressure : 112/60      Respiratory      Respiration: 17, Pulse Oximetry: 96 %             Physical Exam   Constitutional: She is oriented to person, place, and time. No distress.   HENT:   Head: Normocephalic and atraumatic.   Mouth/Throat: Oropharynx is clear and moist.   Eyes: Pupils are equal, round, and reactive to light. Conjunctivae and EOM are normal.   Neck: Normal range of motion. Neck supple. No tracheal deviation present. No thyromegaly present.   Cardiovascular: Normal rate and regular rhythm.    No murmur heard.  Pulmonary/Chest: Effort normal and breath sounds normal. No respiratory distress. She has no wheezes.   Abdominal: Soft. Bowel sounds are normal. She exhibits no distension. There is no tenderness.   Musculoskeletal: She exhibits no edema or tenderness.   Neurological: She is alert and oriented to person, place, and time. No cranial nerve deficit.   Skin: Skin is warm and dry. She is not diaphoretic. No erythema.   Psychiatric: She has a normal mood and affect. Her behavior is normal. Thought content normal.               No results for input(s): ALTSGPT, ASTSGOT, ALKPHOSPHAT, TBILIRUBIN, DBILIRUBIN, GAMMAGT, AMYLASE, LIPASE, ALB, PREALBUMIN, GLUCOSE in the last 72 hours.              Lab Results   Component Value Date    TROPONINI <0.01 2015       Imaging  IR-EMBOLIZE-NEURO-INTRACRANIAL    (Results Pending)          Assessment/Plan     I anticipate this  patient will require at least two midnights for appropriate medical management, necessitating inpatient admission.    Aneurysm of left internal carotid artery- (present on admission)   Assessment & Plan    Post Left ICA flow diverter placement  Monitor neuro checks closely in ICU  To start on aspirin and Plavix this afternoon per Dr. Tracy  If she continued to be stable by tomorrow she can be discharged home and follow-up with Dr. Ospina as an outpatient.            Prophylaxis:  SCDs

## 2018-10-22 NOTE — CARE PLAN
Problem: Skin Integrity  Goal: Risk for impaired skin integrity will decrease    Intervention: Assess risk factors for impaired skin integrity and/or pressure ulcers  2 RN skin check completed

## 2018-10-22 NOTE — OR NURSING
Patient awake and alert, calm and cooperative. Pt has no pain or nausea and has been updated on plan of care. Pt understands that she is to lay flat for 6 hrs and has no questions at this time. Pt slightly hypotensive but map is greater than 65. All other VS stable. Left femoral site is covered with gauze and tegaderm and CDI.

## 2018-10-22 NOTE — CARE PLAN
Problem: Respiratory:  Goal: Respiratory status will improve    Intervention: Assess and monitor pulmonary status   10/22/18 1300   Vitals   Respiration 18   Pulse Oximetry 94 %   OTHER   O2 (LPM) 0   RUL Breath Sounds Clear   RML Breath Sounds Diminished   RLL Breath Sounds Diminished   MAXIMILIANO Breath Sounds Clear   LLL Breath Sounds Diminished      Pt encouraged to cough, deep breath, and use IS q1h

## 2018-10-22 NOTE — H&P
History and Physical    Date: 10/22/2018    PCP: Jessica Gentile M.D.      CC: Left ICA aneurysm    HPI: This is a 68 y.o. female who is presenting left ICA aneurym    Past Medical History:   Diagnosis Date   • Contact dermatitis due to cosmetics 9/16/2015   • Environmental and seasonal allergies    • Guillain Barré syndrome (HCC)     Hx    • Hemorrhagic disorder (HCC)     plavix   • History of kidney stones     x2 episodes   • History of seizures     x2 episodes - no treatment needed   • Hyperlipidemia    • Hypertension    • Intracranial aneurysm    • PAD (peripheral artery disease) 5/7/2014   • Psychiatric problem     depression   • Reactive depression 9/17/2013   • Renal disorder     stones   • Seizure (HCC) 1988    unknown cause       Past Surgical History:   Procedure Laterality Date   • THROMBECTOMY Right 5/25/2018    Procedure: FEMORAL ENDARTERECTOMY, ANGIOPLASTY WITH PATCH, FOREIGN BODY REMOVAL;  Surgeon: Carina Durbin M.D.;  Location: SURGERY [facility];  Service: General   • RECOVERY  10/4/2013    Performed by [facility] at SURGERY SAME DAY [facility]   • FEMORAL POPLITEAL BYPASS  7/7/2013    Performed by Carina Durbin M.D. at SURGERY [facility]   • RECOVERY  7/6/2013    Performed by [facility] at SURGERY [facility]   • ABDOMINAL HYSTERECTOMY TOTAL  1981    w/o BSO due to fibroids   • APPENDECTOMY      during hysterectomy   • OTHER      neuroma removed from feet 4-5x   • OTHER      abdominal stent   • TONSILLECTOMY     • WRIST ORIF         Current Facility-Administered Medications   Medication Dose Route Frequency Provider Last Rate Last Dose   • aspirin (ASA) tablet 325 mg  325 mg Oral Once Janes Rivero M.D.       • ASPIRIN 325 MG PO TABS                 Social History     Social History   • Marital status:      Spouse name: N/A   • Number of children: N/A   • Years of education: N/A     Occupational History   •  Contact  Employee Benefits     Social History Main Topics   • Smoking status: Light Tobacco Smoker     Packs/day: 0.25     Years: 50.00     Types: Cigarettes   • Smokeless tobacco: Never Used      Comment: 1/2 ppd down to 4 cig/day since 4/13, down to 2 cig/day since 1014   • Alcohol use 0.6 oz/week     1 Glasses of wine per week      Comment: 3/week   • Drug use: No   • Sexual activity: Not Currently     Partners: Male      Comment: one son; ;  wk: insurance     Other Topics Concern   • Not on file     Social History Narrative   • No narrative on file       Family History   Problem Relation Age of Onset   • Other Sister         tremors / instability   • Lung Disease Sister         COPD    • Hyperlipidemia Sister    • Other Paternal Grandfather         brain aneurysm       Allergies:  Bee; Nsaids; Codeine; and Keflex    Review of Systems:  Negative    Physical Exam    Vital Signs  Blood Pressure : 112/60   Temperature: 36.9 °C (98.5 °F)   Pulse: 72   Respiration: 17   Pulse Oximetry: 96 %        Labs:                    Radiology:  IR-EMBOLIZE-NEURO-INTRACRANIAL    (Results Pending)             Assessment and Plan:This is a 68 y.o. Left ICA aneurym    Plan:Endovascular repair of ICA aneurym

## 2018-10-22 NOTE — PROGRESS NOTES
IR Procedure Note:     Site Marked and Confirmed with CT imaging by MD, RT and RN pre procedure.     Dr. Riveor performed a left internal carotid artery aneurysm with pipeline embolization. General anesthesia by Dr. Gong.  All vital signs monitoring and medication administration by anesthesia services. - See anesthesia record.    StarClose, tegaderm and gauze applied to right groin, CDI and soft.  Report given to MINDY Toure.  RN transported pt to [facility] with Dr. Gong. Sao2 monitor = 98% on oxygen via NC @ 4 lpm.      Heparin - 5,000U (See anesthesia record)  0949: ACT = 230  1023: Heparin - 1,000U (See anesthesia record)    End Time: 1024     Left Internal Carotid  Artery:  EV3 Pipeline flex embolization device 4.00 mm/ 14 mm REF# [id number] LOT# [id number]    Right Femoral Artery:  [employer] StarClose SE Vascular Closure System, 6F, REF# [id number], LOT# [id number]

## 2018-10-22 NOTE — OR SURGEON
Immediate Post- Operative Note        PostOp Diagnosis: Left ICA aneurym      Procedure(s):Left ICA flow diverter placement      Estimated Blood Loss: Less than 5 ml        Complications: None            10/22/2018     10:35 AM     Janes Rivero

## 2018-10-23 ENCOUNTER — TELEPHONE (OUTPATIENT)
Dept: MEDICAL GROUP | Facility: LAB | Age: 68
End: 2018-10-23

## 2018-10-23 ENCOUNTER — TELEPHONE (OUTPATIENT)
Dept: HOSPITALIST | Facility: MEDICAL CENTER | Age: 68
End: 2018-10-23

## 2018-10-23 ENCOUNTER — HOSPITAL ENCOUNTER (OUTPATIENT)
Dept: LAB | Facility: MEDICAL CENTER | Age: 68
End: 2018-10-23
Attending: HOSPITALIST
Payer: MEDICARE

## 2018-10-23 VITALS
WEIGHT: 90.61 LBS | HEIGHT: 61 IN | OXYGEN SATURATION: 98 % | DIASTOLIC BLOOD PRESSURE: 60 MMHG | SYSTOLIC BLOOD PRESSURE: 112 MMHG | HEART RATE: 79 BPM | BODY MASS INDEX: 17.11 KG/M2 | RESPIRATION RATE: 18 BRPM | TEMPERATURE: 97.9 F

## 2018-10-23 DIAGNOSIS — I67.1 INTRACRANIAL ANEURYSM: ICD-10-CM

## 2018-10-23 DIAGNOSIS — I67.1 ANEURYSM, CAROTID ARTERY, INTERNAL: ICD-10-CM

## 2018-10-23 LAB
ALBUMIN SERPL BCP-MCNC: 3.3 G/DL (ref 3.2–4.9)
ALBUMIN/GLOB SERPL: 1.6 G/DL
ALP SERPL-CCNC: 60 U/L (ref 30–99)
ALT SERPL-CCNC: 11 U/L (ref 2–50)
ANION GAP SERPL CALC-SCNC: 10 MMOL/L (ref 0–11.9)
AST SERPL-CCNC: 15 U/L (ref 12–45)
BILIRUB SERPL-MCNC: 0.7 MG/DL (ref 0.1–1.5)
BUN SERPL-MCNC: 7 MG/DL (ref 8–22)
CALCIUM SERPL-MCNC: 8.9 MG/DL (ref 8.5–10.5)
CFT BLD TEG: 5.3 MIN (ref 5–10)
CHLORIDE SERPL-SCNC: 110 MMOL/L (ref 96–112)
CLOT ANGLE BLD TEG: 67.5 DEGREES (ref 53–72)
CLOT LYSIS 30M P MA LENFR BLD TEG: 0.2 % (ref 0–8)
CO2 SERPL-SCNC: 21 MMOL/L (ref 20–33)
CREAT SERPL-MCNC: 0.42 MG/DL (ref 0.5–1.4)
CT.EXTRINSIC BLD ROTEM: 1.6 MIN (ref 1–3)
ERYTHROCYTE [DISTWIDTH] IN BLOOD BY AUTOMATED COUNT: 48.4 FL (ref 35.9–50)
GLOBULIN SER CALC-MCNC: 2.1 G/DL (ref 1.9–3.5)
GLUCOSE SERPL-MCNC: 101 MG/DL (ref 65–99)
HCT VFR BLD AUTO: 32.1 % (ref 37–47)
HGB BLD-MCNC: 10.7 G/DL (ref 12–16)
MCF BLD TEG: 65.4 MM (ref 50–70)
MCH RBC QN AUTO: 32.3 PG (ref 27–33)
MCHC RBC AUTO-ENTMCNC: 33.3 G/DL (ref 33.6–35)
MCV RBC AUTO: 97 FL (ref 81.4–97.8)
PA AA BLD-ACNC: 17.4 %
PA ADP BLD-ACNC: 15.8 %
PLATELET # BLD AUTO: 228 K/UL (ref 164–446)
PMV BLD AUTO: 9.5 FL (ref 9–12.9)
POTASSIUM SERPL-SCNC: 3.7 MMOL/L (ref 3.6–5.5)
PROT SERPL-MCNC: 5.4 G/DL (ref 6–8.2)
RBC # BLD AUTO: 3.31 M/UL (ref 4.2–5.4)
SODIUM SERPL-SCNC: 141 MMOL/L (ref 135–145)
TEG ALGORITHM TGALG: NORMAL
WBC # BLD AUTO: 11.5 K/UL (ref 4.8–10.8)

## 2018-10-23 PROCEDURE — A9270 NON-COVERED ITEM OR SERVICE: HCPCS | Performed by: RADIOLOGY

## 2018-10-23 PROCEDURE — 700102 HCHG RX REV CODE 250 W/ 637 OVERRIDE(OP): Performed by: RADIOLOGY

## 2018-10-23 PROCEDURE — 80053 COMPREHEN METABOLIC PANEL: CPT

## 2018-10-23 PROCEDURE — 99239 HOSP IP/OBS DSCHRG MGMT >30: CPT | Performed by: HOSPITALIST

## 2018-10-23 PROCEDURE — 85576 BLOOD PLATELET AGGREGATION: CPT | Mod: 91

## 2018-10-23 PROCEDURE — 85027 COMPLETE CBC AUTOMATED: CPT

## 2018-10-23 PROCEDURE — 36415 COLL VENOUS BLD VENIPUNCTURE: CPT

## 2018-10-23 PROCEDURE — 85384 FIBRINOGEN ACTIVITY: CPT

## 2018-10-23 PROCEDURE — 85347 COAGULATION TIME ACTIVATED: CPT

## 2018-10-23 RX ADMIN — ASPIRIN 81 MG: 81 TABLET, COATED ORAL at 05:55

## 2018-10-23 RX ADMIN — CLOPIDOGREL 75 MG: 75 TABLET, FILM COATED ORAL at 05:55

## 2018-10-23 ASSESSMENT — PAIN SCALES - GENERAL
PAINLEVEL_OUTOF10: 0

## 2018-10-23 ASSESSMENT — PATIENT HEALTH QUESTIONNAIRE - PHQ9
2. FEELING DOWN, DEPRESSED, IRRITABLE, OR HOPELESS: NOT AT ALL
1. LITTLE INTEREST OR PLEASURE IN DOING THINGS: NOT AT ALL
SUM OF ALL RESPONSES TO PHQ9 QUESTIONS 1 AND 2: 0

## 2018-10-23 NOTE — PROGRESS NOTES
Pt explained discharge instructions and given copy. Afterward she was was discharged, transferred out of ICU via wheelchair accompanied by nurse aide and .

## 2018-10-23 NOTE — DISCHARGE INSTRUCTIONS
"    Cerebral Aneurysm  An aneurysm is the bulging or ballooning out of part of the weakened wall of a vein or artery. An aneurysm in the vein or artery of the brain is called a brain aneurysm, or cerebral aneurysm.   Aneurysms are a risk to your health because they may leak or rupture. Once the aneurysm leaks or ruptures, bleeding occurs. If the bleeding occurs within the brain tissue, the condition is called an intracerebral hemorrhage. An intracerebral hemorrhage can result in a hemorrhagic stroke. If the bleeding occurs in the area between the brain and the thin tissues that cover the brain, the condition is called a subarachnoid hemorrhage. This increases the pressure on the brain and causes some areas of the brain to not get the necessary blood flow. The blood from the ruptured aneurysm collects and presses on the surrounding brain tissue. A subarachnoid hemorrhage can cause a stroke. A ruptured cerebral aneurysm is a medical emergency. This can cause permanent damage and loss of brain function.  CAUSES  A cerebral aneurysm is caused when a weakened part of the blood vessel expands. The blood vessel expands due to the constant pressure from the flow of blood through the weakened blood vessel. Usually the aneurysm expands slowly. As the weakened aneurysm expands, the walls of the aneurysm become weaker. Aneurysms may be associated with diseases that weaken and damage the walls of your blood vessels or blood vessels that develop abnormally. Some known causes for cerebral aneurysms are:  · Head trauma.  · Infection.  · Use of \"recreational drugs\" such as cocaine or amphetamines.  RISK FACTORS  People at risk for a cerebral aneurysm or hemorrhagic stroke usually have one or more risk factors, which include:  · Having high blood pressure (hypertension).  · Abusing alcohol.  · Having abnormal blood vessels present since birth.  · Having certain bleeding disorders, such as hemophilia, sickle cell disease, or liver " disease.  · Taking blood thinners (anticoagulants).  · Smoking.  · Having a family history of aneurysm.  SIGNS AND SYMPTOMS   The signs and symptoms of an unruptured cerebral aneurysm will partly depend on its size and rate of growth. A small, unchanging aneurysm generally does not produce symptoms. A larger aneurysm that is steadily growing can increase pressure on the brain or nerves. That increased pressure from the unruptured cerebral aneurysm can cause:  · A headache.  · Problems with your vision.  · Numbness or weakness in an arm or leg.  · Problems with memory.  · Problems speaking.  · Seizures.  If an aneurysm leaks or bursts, it can cause a stroke and be life-threatening. Symptoms may include:  · A sudden, severe headache with no known cause. The headache is often described as the worst headache ever experienced.  · Nausea or vomiting, especially when combined with other symptoms such as a headache.  · Sudden weakness or numbness of the face, arm, or leg, especially on one side of the body.  · Sudden trouble walking or difficulty moving arms or legs.  · Sudden confusion.  · Sudden personality changes.  · Trouble speaking (aphasia) or understanding.  · Difficulty swallowing.  · Sudden trouble seeing in one or both eyes.  · Double vision.  · Dizziness.  · Loss of balance or coordination.  · Intolerance to light.  · Stiff neck.  DIAGNOSIS   Your health care provider may use one of the following tests to diagnose your aneurysm:  · Computed tomographic angiography (CTA). This test uses dye and a scanner to produce images of your blood vessels.  · Magnetic resonance angiography (MRA). This test uses an MRI machine to produce images of your blood vessels.  · Digital subtraction angiography (DSA). This test uses dye and X-rays to take images of your blood vessels. Your health care provider may use this test to help determine the best course of treatment.  TREATMENT   Unruptured Aneurysms   Treatment is complex when  an aneurysm is found and it is not causing problems. Treatment is very individualized, as each case is different. Many things must be considered, such as the size and exact location of your aneurysm, your age, your overall health, and your feelings and preferences. Small aneurysms in certain locations of the brain have a very low chance of bleeding or rupturing. These small aneurysms may not be treated. However, depending on the size and location of the aneurysm, treatments may be recommended and include:  · Coiling. During this procedure, a catheter is inserted and advanced through a blood vessel. Once the catheter reaches the aneurysm, tiny coils are used to block blood flow into the aneurysm.  · Surgical clipping. During surgery, a clip is placed at the base of the aneurysm. The clip prevents blood from continuing to enter the aneurysm.  · Flow diversion. This procedure is used to divert blood flow around the aneurysm.  Ruptured Aneurysms   Immediate emergency surgery may be needed to help prevent damage to the brain and to reduce the risk of rebleeding. Timing of treatment is an important factor in the prevention of complications. Successful early treatment of a ruptured aneurysm (within the first 3 days of a bleed) helps to prevent rebleeding and blood vessel spasm. In some cases, there may be a reason to treat later (10-14 days after a rupture). Many things are considered when making this decision, and each case is handled individually.  HOME CARE INSTRUCTIONS  · Take medicines only as instructed by your health care provider.  · Eat healthy foods. It is recommended that you eat 5 or more servings of fruits and vegetables each day. Foods may need to be a special consistency (soft or pureed), or small bites may need to be taken if you have had a ruptured aneurysm or stroke. Certain dietary changes may be advised to address high blood pressure, high cholesterol, diabetes, or obesity.  ¨ Food choices that are low  in salt (sodium), saturated fat, trans fat, and cholesterol are recommended to manage high blood pressure.  ¨ Food choices that are high in fiber and low in saturated fat, trans fat, and cholesterol are recommended to control cholesterol levels.  ¨ Controlling carbohydrate and sugar intake is recommended to manage diabetes.  ¨ Reducing calorie intake and making food choices that are low in sodium, saturated fat, trans fat, and cholesterol are recommended to manage obesity.  · Maintain a healthy weight.  · Stay physically active. It is recommended that you get at least 30 minutes of activity on most or all days.  · Do not smoke.  · Limit alcohol use. Moderate alcohol use is considered to be:  ¨ No more than 2 drinks each day for men.  ¨ No more than 1 drink each day for nonpregnant women.  · Stop drug abuse.  · A safe home environment is important to reduce the risk of falls. Your health care provider may arrange for specialists to evaluate your home. Having grab bars in the bedroom and bathroom is often important. Your health care provider may arrange for special equipment to be used at home, such as raised toilets and a seat for the shower.  · Physical, occupational, and speech therapy. Ongoing therapy may be needed to maximize your recovery after a ruptured aneurysm or stroke. If you have been advised to use a walker or a cane, use it at all times. Be sure to keep your therapy appointments.  · Follow all instructions for follow-up with your health care provider. This is very important. This includes any referrals, physical therapy, rehabilitation, and laboratory tests. Proper follow-up may prevent an aneurysm rupture or a stroke.  SEEK IMMEDIATE MEDICAL CARE IF:  · You have a sudden, severe headache with no known cause.  · You have sudden nausea or vomiting with a severe headache.  · You have sudden weakness or numbness of the face, arm, or leg, especially on one side of the body.  · You have sudden trouble  walking or difficulty moving arms or legs.  · You have sudden confusion.  · You have trouble speaking or understanding.  · You have sudden trouble seeing in one or both eyes.  · You have a sudden loss of balance or coordination.  · You have a stiff neck.  · You have difficulty breathing.  · You have a partial or total loss of consciousness.  Any of these symptoms may represent a serious problem that is an emergency. Do not wait to see if the symptoms will go away. Get medical help at once. Call your local emergency services (911 in [place] Do not drive yourself to the hospital.   This information is not intended to replace advice given to you by your health care provider. Make sure you discuss any questions you have with your health care provider.  Document Released: 09/09/2003 Document Revised: 01/08/2016 Document Reviewed: 06/05/2014  [employer] Interactive Patient Education © 2017 [employer]  Discharge Instructions    Discharged to home by car with relative. Discharged via wheelchair, hospital escort: Yes.  Special equipment needed: Not Applicable    Be sure to schedule a follow-up appointment with your primary care doctor or any specialists as instructed.     Discharge Plan:   Diet Plan: Discussed  Activity Level: Discussed  Confirmed Follow up Appointment: Patient to Call and Schedule Appointment  Confirmed Symptoms Management: Discussed  Medication Reconciliation Updated: Yes  Influenza Vaccine Indication: Patient Refuses    I understand that a diet low in cholesterol, fat, and sodium is recommended for good health. Unless I have been given specific instructions below for another diet, I accept this instruction as my diet prescription.   Other diet: regular        · Is patient discharged on Warfarin / Coumadin?   No     Depression / Suicide Risk    As you are discharged from this [facility] facility, it is important to learn how to keep safe from harming yourself.    Recognize the warning signs:  · Abrupt  changes in personality, positive or negative- including increase in energy   · Giving away possessions  · Change in eating patterns- significant weight changes-  positive or negative  · Change in sleeping patterns- unable to sleep or sleeping all the time   · Unwillingness or inability to communicate  · Depression  · Unusual sadness, discouragement and loneliness  · Talk of wanting to die  · Neglect of personal appearance   · Rebelliousness- reckless behavior  · Withdrawal from people/activities they love  · Confusion- inability to concentrate     If you or a loved one observes any of these behaviors or has concerns about self-harm, here's what you can do:  · Talk about it- your feelings and reasons for harming yourself  · Remove any means that you might use to hurt yourself (examples: pills, rope, extension cords, firearm)  · Get professional help from the community (Mental Health, Substance Abuse, psychological counseling)  · Do not be alone:Call your Safe Contact- someone whom you trust who will be there for you.  · Call your local CRISIS HOTLINE 481-9801 or 517-462-2282  · Call your local Children's Mobile Crisis Response Team [place] (593) 498-1792 or www.Ritot  · Call the toll free National Suicide Prevention Hotlines   · National Suicide Prevention Lifeline 565-474-IKZR (2250)  National Hope Line Network 800-SUICIDE (248-5304)        Embolization  Embolization is a procedure to block one or more of your blood vessels. This procedure may be done to stop bleeding inside your body or to cut off the blood supply to an abnormal growth of blood vessels or a tumor. Embolization may also be used to treat blood vessels that have become weak and are leaking or in danger of rupturing (aneurysm). A type of medication or synthetic material (embolic agents) that is injected into an artery or vein through a long plastic tube (catheter) is used to stop the flow of blood.   LET YOUR HEALTH CARE PROVIDER KNOW  ABOUT  · Any allergies you have.  · All medicines you are taking, including vitamins, herbs, eye drops, creams, and over-the-counter medicines.  · Previous problems you or members of your family have had with the use of anesthetics.  · Any blood disorders you have.  · Previous surgeries you have had.  · Medical conditions you have, especially diabetes or kidney problems.  RISKS AND COMPLICATIONS   Generally, this is a safe procedure. However, as with any procedure, problems can occur. Possible problems include:  · Allergic reaction.  · Infection.  · Swelling, bleeding, or bruising at the puncture site.  · Blood clots.  · Damage to the blood vessel.  · Kidney damage.  BEFORE THE PROCEDURE  Before the procedure, you may have blood tests to make sure your kidneys and liver are working well. These tests can also check to see if your blood is clotting like it should. If you take medicine to keep your blood from clotting (anticoagulants), ask your health care provider when you should stop taking them.    You may need to stop eating and drinking for 6-8 hours before the procedure. Ask your health care provider.  Make arrangements for someone to take you home. Depending on the procedure, you may be able to go home the same day. However, most people stay overnight in the hospital after this procedure. Ask your health care provider what to expect.  PROCEDURE  Embolization usually takes about 90 minutes. The procedure may vary depending on which part of your body is being treated, but usually the following things will be done:  · You may be given medicine that makes you go to sleep (general anesthetic) or a medicine that numbs only a particular area (local anesthetic). If you are given a local anesthetic, you may also be given medicine to make you relaxed and sleepy (sedative).  · An IV tube will be inserted into your body. Medicine will flow through this tube.  · A needle will be inserted into one of the large blood vessels  in your groin (femoral blood vessel).  · A catheter will be inserted into the needle and guided to the area that needs to be treated.  · A dye will be injected through the IV tube and X-rays will be taken. This helps to show the exact location of the blood vessels that are causing the problem.  · The embolic agent will then be injected into the blood vessel.  · More X-rays will be taken to make sure the blood vessel has been closed.  · The catheter will be removed and pressure will be applied to the incision to stop any bleeding.  · A bandage (dressing) will be applied.  AFTER THE PROCEDURE  · You will stay in a recovery area until the anesthesia has worn off. Your blood pressure and pulse will be checked.  · You may also get medication to make you feel comfortable if you have pain, headache, or nausea after the procedure.  · You will need to remain lying down for 6-8 hours. This may mean you have to stay overnight in the hospital. People usually can leave the hospital within 24 hours after the procedure.  This information is not intended to replace advice given to you by your health care provider. Make sure you discuss any questions you have with your health care provider.  Document Released: 12/23/2014 Document Reviewed: 12/23/2014  [employer] Interactive Patient Education © 2017 [employer]

## 2018-10-23 NOTE — TELEPHONE ENCOUNTER
1. Caller Name: Dr.David Lynch                                         Call Back Number: [phone]      Patient approves a detailed voicemail message: yes    Patient is needing labs orders placed-TEG with platelet mapping.

## 2018-10-23 NOTE — TELEPHONE ENCOUNTER
Insufficient serum for lab to complete this am prior to hospital discharge.  Needs lab drawn outpatient per request of Dr Rivero.

## 2018-10-23 NOTE — CARE PLAN
Problem: Safety  Goal: Will remain free from injury    Intervention: Provide assistance with mobility  Pt assisted to edge of bed and to stand with little to no assistance. Tolerated well. Educated to call if wanting to get out of bed.      Problem: Venous Thromboembolism (VTW)/Deep Vein Thrombosis (DVT) Prevention:  Goal: Patient will participate in Venous Thrombosis (VTE)/Deep Vein Thrombosis (DVT)Prevention Measures    Intervention: Ensure patient wears graduated elastic stockings (JASKARAN hose) and/or SCDs, if ordered, when in bed or chair (Remove at least once per shift for skin check)  Pt agrees to wear SCD's when in bed.

## 2018-10-23 NOTE — PROGRESS NOTES
Called by blood blank and notified that blue top lab was not filled enough though this filled tube with as much blood as tube would take. IR called to notify Dr. Rivero but he was unavailable. Varsha in IR took down message and said that she would notify

## 2018-10-24 ENCOUNTER — PATIENT OUTREACH (OUTPATIENT)
Dept: HEALTH INFORMATION MANAGEMENT | Facility: OTHER | Age: 68
End: 2018-10-24

## 2018-10-24 NOTE — DISCHARGE SUMMARY
Discharge Summary    CHIEF COMPLAINT ON ADMISSION  No chief complaint on file.      Reason for Admission  Cerebral aneurysm, nonruptured     Admission Date  10/22/2018    CODE STATUS  Full code    HPI & HOSPITAL COURSE  This is a 68 y.o. female here with a history of a left internal carotid artery aneurysm is monitored in the intensive care unit after interventional radiologist, , placed a flow diverter on 10/22/18.  Patient remained neurologically intact without any difficulties or neurologic deficits after interventional radiology placement.  There was concern from an abnormal TEG on 10/22.  Dr. Rivero wanted a repeat TEG which was done outpatient.  Patient was aware to return to the hospital if any acute worsening headache pain swelling or bleeding from the insertion site from the procedure.  Patient is aware to follow-up with the interventional radiologist as discussed.       Therefore, she is discharged in good and stable condition to home with close outpatient follow-up.    The patient recovered much more quickly than anticipated on admission.    Discharge Date  10/23/2018    FOLLOW UP ITEMS POST DISCHARGE  TEG    DISCHARGE DIAGNOSES  Active Problems:    Aneurysm of left internal carotid artery POA: Yes  Resolved Problems:    * No resolved hospital problems. *      FOLLOW UP  No future appointments.  Jessica Gentile M.D.  [address] [address]  [phone]            MEDICATIONS ON DISCHARGE     Medication List      CONTINUE taking these medications      Instructions   atorvastatin 40 MG Tabs  Commonly known as:  LIPITOR   TAKE 1 TABLET BY MOUTH EVERYDAY AT BEDTIME     clopidogrel 75 MG Tabs  Commonly known as:  PLAVIX   TAKE 1 TAB BY MOUTH EVERY DAY.  Dose:  75 mg     naphazoline-pheniramine 0.027-0.315 % Soln  Commonly known as:  OPCON-A   Place 1 Drop in left eye 1 time daily as needed.  Dose:  1 Drop     therapeutic multivitamin-minerals Tabs   Take 1 Tab  by mouth every day.  Dose:  1 Tab     Vitamin C CR 1000 MG Tbcr   Take 1 Tab by mouth every day.  Dose:  1 Tab            Allergies  Allergies   Allergen Reactions   • Bee Anaphylaxis   • Nsaids Swelling     2 trips to ER w reactions of rash swelling with difficulty breathing.   • Codeine Nausea     dizzy   • Keflex Vomiting     No trouble with amoxil       DIET  No orders of the defined types were placed in this encounter.      ACTIVITY  As tolerated.  Weight bearing as tolerated    CONSULTATIONS  Dr Rivero    PROCEDURES  Left internal carotid artery flow diverter insertion 10/22/18 for left internal carotid aneurysm    LABORATORY  Lab Results   Component Value Date    SODIUM 141 10/23/2018    POTASSIUM 3.7 10/23/2018    CHLORIDE 110 10/23/2018    CO2 21 10/23/2018    GLUCOSE 101 (H) 10/23/2018    BUN 7 (L) 10/23/2018    CREATININE 0.42 (L) 10/23/2018        Lab Results   Component Value Date    WBC 11.5 (H) 10/23/2018    HEMOGLOBIN 10.7 (L) 10/23/2018    HEMATOCRIT 32.1 (L) 10/23/2018    PLATELETCT 228 10/23/2018        Total time of the discharge process exceeds 31 minutes.

## 2018-11-02 ENCOUNTER — HOSPITAL ENCOUNTER (OUTPATIENT)
Dept: RADIOLOGY | Facility: MEDICAL CENTER | Age: 68
End: 2018-11-02
Attending: NURSE PRACTITIONER
Payer: MEDICARE

## 2018-11-02 DIAGNOSIS — I67.1 INTRACRANIAL ANEURYSM: ICD-10-CM

## 2018-11-02 ASSESSMENT — ENCOUNTER SYMPTOMS
SENSORY CHANGE: 0
HEADACHES: 1
FOCAL WEAKNESS: 0
SPEECH CHANGE: 0
DIZZINESS: 1
BLOOD IN STOOL: 0
BRUISES/BLEEDS EASILY: 1

## 2018-11-02 NOTE — CONSULTS
"Neuro Interventional Service Follow Up     Re: Reina Gandara     MRN: [id number]   : 1950    Reina Gandara was seen today in follow up after aneurysm intervention performed by Dr. Rivero at [facility] on 2018.  She was referred to our service by Maria Eugenia DEE and is also under the care of Carina Durbin MD.    History of Present Illness:   presented with memory loss in 2017, which prompted workup for dementia. An MRA was ultimately performed, which revealed three unruptured aneurysms: 11 mm sized aneurysm from the ophthalmic segment of the right internal carotid artery, 8 x 5 mm sized wide necked aneurysm from the left cavernous segment of the internal carotid artery, and 5 x 3 mm sized aneurysm of the right middle cerebral artery bifurcation. She has a history of surgery for left foot arterial occlusion but denies problems with left leg cramping and pain with walking. She takes Plavix for this problem and complains of easy bruising and bleeding. She is unable to take aspirin. She describes two isolated incidences of seizure in  3-4 months apart. She has not had seizures before or since that time, and sought care. She states she was diagnosed with an \"AV anomaly\" and maybe even an aneurysm and underwent a catheter angiogram with no reported evidence of aneurysm; however her recollection of those events are poor. She does not know her mother's family history but reports a paternal uncle who  from an aneurysm rupture, as well as several more distant paternal relatives who had \"brain hemorrhage.\" She is a daily smoker. She has controlled hypertension. She was referred to Janes Rivero MD for evaluation and management of the multiple intracranial aneurysms and underwent a procedure on May 25, 2018 to treat both aneurysms in the right territory. Dr. Rivero performed coil embolization of the ophthalmic segment right internal carotid artery " "aneurysm and the stent assisted coiling of the right middle cerebral artery aneurysm. The coiling procedure was unremarkable, however post procedure the patient had right lower extremity pain and changes consistent with arterial occlusion and was taken emergently to the OR by Dr. Durbin, who performed a successful endarterectomy and patch angioplasty and the patient recovered well. She returned on October 22 for intervention of the left ICA aneurysm with a Pipeline Flow Diverter. The patient recovered well in the intensive care unit and was discharged to home the following day. Platelet mapping results were conflicting with clinical correlation but she was able to tolerate the addition of a baby aspirin prior to discharge. Dr. Rivero discussed updated platelet mapping results with her via phone after discharge. She is seen today for evaluation after the procedure. Today, the patient complains of a mild, \"nagging\" headache. It is present since her procedure and has been slowly improving. She complains of mild fatigue since the procedure, which is also improving. She states she was fatigued for a short time after the last aneurysm coiling as well. Today, she denies pain in her legs with walking or problems with the angio puncture site. She bruises easily because of the Plavix and aspirin and denies heavy bleeding with epistaxis, hematuria, or bloody stools. She noticed some mild dizziness when she turned her head too quickly but it has improved since her amlodipine has been discontinued. She denies any symptoms of allergic reaction with the addition of aspirin. She is functioning nearly at baseline.     Past Medical History:   Diagnosis Date   • Contact dermatitis due to cosmetics 9/16/2015   • Environmental and seasonal allergies    • Guillain Barré syndrome (HCC)     Hx    • Hemorrhagic disorder (HCC)     plavix   • History of kidney stones     x2 episodes   • History of seizures     x2 episodes - no treatment " needed   • Hyperlipidemia    • Hypertension    • Intracranial aneurysm    • PAD (peripheral artery disease) 5/7/2014   • Psychiatric problem     depression   • Reactive depression 9/17/2013   • Renal disorder     stones   • Seizure (HCC) 1988    unknown cause     Past Surgical History:   Procedure Laterality Date   • THROMBECTOMY Right 5/25/2018    Procedure: FEMORAL ENDARTERECTOMY, ANGIOPLASTY WITH PATCH, FOREIGN BODY REMOVAL;  Surgeon: Carina Durbin M.D.;  Location: SURGERY [facility];  Service: General   • RECOVERY  10/4/2013    Performed by [facility] at SURGERY SAME DAY [facility]   • FEMORAL POPLITEAL BYPASS  7/7/2013    Performed by Carina Durbin M.D. at SURGERY [facility]   • RECOVERY  7/6/2013    Performed by [facility] at SURGERY [facility]   • ABDOMINAL HYSTERECTOMY TOTAL  1981    w/o BSO due to fibroids   • APPENDECTOMY      during hysterectomy   • OTHER      neuroma removed from feet 4-5x   • OTHER      abdominal stent   • TONSILLECTOMY     • WRIST ORIF       Social History     Social History   • Marital status:      Spouse name: N/A   • Number of children: N/A   • Years of education: N/A     Occupational History   •  [employer]     Social History Main Topics   • Smoking status: Light Tobacco Smoker     Packs/day: 0.25     Years: 50.00     Types: Cigarettes   • Smokeless tobacco: Never Used      Comment: 1/2 ppd down to 4 cig/day since 4/13, down to 2 cig/day since 1014   • Alcohol use 0.6 oz/week     1 Glasses of wine per week      Comment: 3/week   • Drug use: No   • Sexual activity: Not Currently     Partners: Male      Comment: one son; ;  wk: insurance     Other Topics Concern   • Not on file     Social History Narrative   • No narrative on file     Family History   Problem Relation Age of Onset   • Other Sister         tremors / instability   • Lung Disease Sister         COPD    • Hyperlipidemia Sister    •  Other Paternal Grandfather         brain aneurysm       Review of Systems   Constitutional: Positive for malaise/fatigue.   HENT: Negative for nosebleeds.    Eyes:        Negative for icterus   Gastrointestinal: Negative for blood in stool and melena.   Genitourinary: Negative for hematuria.   Skin: Negative.    Neurological: Positive for dizziness and headaches. Negative for sensory change, speech change and focal weakness.   Endo/Heme/Allergies: Bruises/bleeds easily.       A comprehensive 14-point review of systems was negative except as described above.     Labs:      Ref. Range 10/18/2018 11:26 10/23/2018 05:51   WBC Latest Ref Range: 4.8 - 10.8 K/uL 9.4 11.5 (H)   RBC Latest Ref Range: 4.20 - 5.40 M/uL 4.21 3.31 (L)   Hemoglobin Latest Ref Range: 12.0 - 16.0 g/dL 13.6 10.7 (L)   Hematocrit Latest Ref Range: 37.0 - 47.0 % 41.0 32.1 (L)   MCV Latest Ref Range: 81.4 - 97.8 fL 97.4 97.0   MCH Latest Ref Range: 27.0 - 33.0 pg 32.3 32.3   MCHC Latest Ref Range: 33.6 - 35.0 g/dL 33.2 (L) 33.3 (L)   RDW Latest Ref Range: 35.9 - 50.0 fL 47.5 48.4   Platelet Count Latest Ref Range: 164 - 446 K/uL 295 228   MPV Latest Ref Range: 9.0 - 12.9 fL 9.7 9.5   Neutrophils-Polys Latest Ref Range: 44.00 - 72.00 % 68.80    Neutrophils (Absolute) Latest Ref Range: 2.00 - 7.15 K/uL 6.49    Lymphocytes Latest Ref Range: 22.00 - 41.00 % 24.70    Lymphs (Absolute) Latest Ref Range: 1.00 - 4.80 K/uL 2.33    Monocytes Latest Ref Range: 0.00 - 13.40 % 5.10    Monos (Absolute) Latest Ref Range: 0.00 - 0.85 K/uL 0.48    Eosinophils Latest Ref Range: 0.00 - 6.90 % 0.50    Eos (Absolute) Latest Ref Range: 0.00 - 0.51 K/uL 0.05    Basophils Latest Ref Range: 0.00 - 1.80 % 0.50    Baso (Absolute) Latest Ref Range: 0.00 - 0.12 K/uL 0.05    Immature Granulocytes Latest Ref Range: 0.00 - 0.90 % 0.40    Immature Granulocytes (abs) Latest Ref Range: 0.00 - 0.11 K/uL 0.04    Nucleated RBC Latest Units: /100 WBC 0.00    NRBC (Absolute) Latest Units:  K/uL 0.00       Ref. Range 10/23/2018 05:51   Sodium Latest Ref Range: 135 - 145 mmol/L 141   Potassium Latest Ref Range: 3.6 - 5.5 mmol/L 3.7   Chloride Latest Ref Range: 96 - 112 mmol/L 110   Co2 Latest Ref Range: 20 - 33 mmol/L 21   Anion Gap Latest Ref Range: 0.0 - 11.9  10.0   Glucose Latest Ref Range: 65 - 99 mg/dL 101 (H)   Bun Latest Ref Range: 8 - 22 mg/dL 7 (L)   Creatinine Latest Ref Range: 0.50 - 1.40 mg/dL 0.42 (L)   GFR If  Latest Ref Range: >60 mL/min/1.73 m 2 >60   GFR If Non  Latest Ref Range: >60 mL/min/1.73 m 2 >60   Calcium Latest Ref Range: 8.5 - 10.5 mg/dL 8.9   AST(SGOT) Latest Ref Range: 12 - 45 U/L 15   ALT(SGPT) Latest Ref Range: 2 - 50 U/L 11   Alkaline Phosphatase Latest Ref Range: 30 - 99 U/L 60   Total Bilirubin Latest Ref Range: 0.1 - 1.5 mg/dL 0.7   Albumin Latest Ref Range: 3.2 - 4.9 g/dL 3.3   Total Protein Latest Ref Range: 6.0 - 8.2 g/dL 5.4 (L)   Globulin Latest Ref Range: 1.9 - 3.5 g/dL 2.1   A-G Ratio Latest Units: g/dL 1.6        Ref. Range 10/22/2018 07:20 10/22/2018 09:51 10/23/2018 14:21   Reaction Time Initial-R Latest Ref Range: 5.0 - 10.0 min 10.5 (H)  5.3   Clot Kinetics-K Latest Ref Range: 1.0 - 3.0 min 2.4  1.6   Clot Angle-Angle Latest Ref Range: 53.0 - 72.0 degrees 56.3  67.5   Maximum Clot Strength-MA Latest Ref Range: 50.0 - 70.0 mm 58.3  65.4   Lysis 30 minutes-LY30 Latest Ref Range: 0.0 - 8.0 % 0.2  0.2   % Inhibition AA Latest Units: % 0.0  17.4   % Inhibition ADP Latest Units: % 0.0  15.8     Radiology:   Neurointerventional procedure October 22, 2018 at [facility]:  1.  Endovascular repair of left internal carotid artery aneurysm using Pipeline Flow Diverter.  2.  There is no recurrent or residual previously repaired left internal carotid artery left middle cerebral artery aneurysms.  3.  Stable an approximately 1.3 mm sized right M1 segment aneurysm.    Neurointerventional radiology procedure on May 25, 2018 at [facility]:  1.   Endovascular repair of right para ophthalmic artery aneurysm  2.  Stent assisted endovascular repair of right middle cerebral artery aneurysm  3.  An approximately 1 mm sized middle cerebral aneurysm at the origin of right anterior temporal artery. No endovascular intervention was performed.  4.  An approximately 5 mm sized wide necked aneurysm at the left cavernous segment of the internal carotid artery. No endovascular intervention was performed.         MRA on October 16, 2017 at [facility]:  1.  11 mm sized aneurysm from the ophthalmic segment of the right internal carotid artery.  2.  8 x 5 mm sized wide necked aneurysm from the left cavernous segment of the internal carotid artery.  3.  5 x 3 mm sized aneurysm of the right middle cerebral artery bifurcation.             Current Outpatient Prescriptions   Medication Sig Dispense Refill   • therapeutic multivitamin-minerals (THERAGRAN-M) Tab Take 1 Tab by mouth every day.     • naphazoline-pheniramine (OPCON-A) 0.027-0.315 % Solution Place 1 Drop in left eye 1 time daily as needed.     • atorvastatin (LIPITOR) 40 MG Tab TAKE 1 TABLET BY MOUTH EVERYDAY AT BEDTIME 90 Tab 1   • clopidogrel (PLAVIX) 75 MG Tab TAKE 1 TAB BY MOUTH EVERY DAY. 90 Tab 1   • Ascorbic Acid (VITAMIN C CR) 1000 MG Tab CR Take 1 Tab by mouth every day.       No current facility-administered medications for this encounter.        Allergies   Allergen Reactions   • Bee Anaphylaxis   • Nsaids Swelling     2 trips to ER w reactions of rash swelling with difficulty breathing.   • Codeine Nausea     dizzy   • Keflex Vomiting     No trouble with amoxil       Physical Exam   Constitutional: She is oriented to person, place, and time and well-developed, well-nourished, and in no distress. No distress.   HENT:   Head: Normocephalic.   Eyes: Pupils are equal, round, and reactive to light. Conjunctivae are normal. No scleral icterus.   Pulmonary/Chest: Effort normal. No respiratory distress.   Abdominal:  Soft. Bowel sounds are normal. She exhibits no distension.   Neurological: She is alert and oriented to person, place, and time. She has normal sensation and normal strength. She is not agitated and not disoriented. She displays no weakness, no tremor, facial symmetry, normal stance and normal speech. No cranial nerve deficit. Gait normal. Coordination and gait normal.   Skin: Skin is warm and dry. Bruising noted. No rash noted. She is not diaphoretic. No erythema. No pallor.        Psychiatric: Mood, memory, affect and judgment normal.       Impression:   1. Unruptured left internal carotid artery aneurysm 7.5 mm in size, status post flow diverter placement.   2. Unruptured right middle cerebral artery aneurysm 4-5 mm in size, status post stent assisted coil embolization.  3. Unruptured right internal carotid artery aneurysm 11 mm in size, status post coil embolization.  4. 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   5. Right femoral artery post angiography occlusion, status post endarterectomy and patch angioplasty.  6. Family history of aneurysm rupture.  7. Hypertension, controlled.  8. Peripheral arterial disease, on Plavix and aspirin.  9. Hyperlipidemia.    Plan:   Janes Rivero MD additionally evaluated the patient today. We discussed the method of the procedure at length and reviewed imaging studies. All questions were answered. Her fatigue should resolve. The headache is likely vascular in nature and related to the placement of the flow diverter and should resolve within 6 weeks of the procedure. Dr. Rivero has reviewed the results of the TEG and platelet mapping with the patient. She has demonstrated maintaining patency of stents on clopidogrel monotherapy in the past and demonstrates mild but increased bruising on the aspirin, therefore we will continue her current regimen. She should remain on Plavix as she was  taking this medication prior to our interventions but may stop the aspirin in late April 2019. We discussed the small possibility of aneurysm recurrence and the need to continue surveillance of the aneurysms. There was no evidence of recurrence or change of the three right aneurysms on angiogram at the time of the flow diverter placement. We have planned that the next imaging study will a catheter angiogram performed in 1 year.       LUIZ Bridges with Janes Rivero MD  Neuro Interventional Service   [address] ([id number])  [place] [address]  [phone]

## 2018-11-16 ENCOUNTER — TELEPHONE (OUTPATIENT)
Dept: MEDICAL GROUP | Facility: LAB | Age: 68
End: 2018-11-16

## 2018-11-16 NOTE — TELEPHONE ENCOUNTER
Received a call from [facility] pt Advocate [phone]  She stated pt has been having issues with short term memory. Please reach out for a follow up appt. Called pt LVM to call and schedule a FV. Also sent pt My Chart

## 2018-11-23 ENCOUNTER — PATIENT OUTREACH (OUTPATIENT)
Dept: HEALTH INFORMATION MANAGEMENT | Facility: OTHER | Age: 68
End: 2018-11-23

## 2018-11-30 NOTE — PROGRESS NOTES
Reina Gandara was admitted to [facility] on 10/2218 for a cerebral aneurysm. Patient discharged home on 10/23/18. [facility] Patient Advocate successfully engaged with patient post-discharge. However, patient requested [facility] only reach out once a week. Per discharge orders, patient was instructed to see Dr. Rivero. Patient saw Dr. Rivero on 11/2/18. Lace score 49 and PPS Screening not completed.

## 2019-01-08 ENCOUNTER — HOSPITAL ENCOUNTER (EMERGENCY)
Facility: MEDICAL CENTER | Age: 69
End: 2019-01-08
Attending: EMERGENCY MEDICINE
Payer: MEDICARE

## 2019-01-08 ENCOUNTER — APPOINTMENT (OUTPATIENT)
Dept: RADIOLOGY | Facility: MEDICAL CENTER | Age: 69
End: 2019-01-08
Attending: EMERGENCY MEDICINE
Payer: MEDICARE

## 2019-01-08 VITALS
TEMPERATURE: 98.5 F | BODY MASS INDEX: 16.56 KG/M2 | DIASTOLIC BLOOD PRESSURE: 59 MMHG | RESPIRATION RATE: 16 BRPM | HEIGHT: 62 IN | WEIGHT: 90 LBS | HEART RATE: 89 BPM | SYSTOLIC BLOOD PRESSURE: 105 MMHG | OXYGEN SATURATION: 90 %

## 2019-01-08 DIAGNOSIS — N39.0 ACUTE UTI: ICD-10-CM

## 2019-01-08 DIAGNOSIS — R94.31 ABNORMAL EKG: ICD-10-CM

## 2019-01-08 DIAGNOSIS — F10.920 ALCOHOLIC INTOXICATION WITHOUT COMPLICATION (HCC): ICD-10-CM

## 2019-01-08 LAB
AMPHETAMINES UR QL SCN: NEGATIVE
ANION GAP SERPL CALC-SCNC: 8 MMOL/L (ref 0–11.9)
APPEARANCE UR: ABNORMAL
APTT PPP: 29.5 SEC (ref 24.7–36)
BACTERIA #/AREA URNS HPF: ABNORMAL /HPF
BARBITURATES UR QL SCN: NEGATIVE
BASOPHILS # BLD AUTO: 0.8 % (ref 0–1.8)
BASOPHILS # BLD: 0.06 K/UL (ref 0–0.12)
BENZODIAZ UR QL SCN: NEGATIVE
BILIRUB UR QL STRIP.AUTO: NEGATIVE
BUN SERPL-MCNC: 8 MG/DL (ref 8–22)
CALCIUM SERPL-MCNC: 8.9 MG/DL (ref 8.4–10.2)
CHLORIDE SERPL-SCNC: 108 MMOL/L (ref 96–112)
CO2 SERPL-SCNC: 24 MMOL/L (ref 20–33)
COCAINE UR QL SCN: NEGATIVE
COLOR UR: YELLOW
CREAT SERPL-MCNC: 0.55 MG/DL (ref 0.5–1.4)
EOSINOPHIL # BLD AUTO: 0.18 K/UL (ref 0–0.51)
EOSINOPHIL NFR BLD: 2.3 % (ref 0–6.9)
EPI CELLS #/AREA URNS HPF: ABNORMAL /HPF
ERYTHROCYTE [DISTWIDTH] IN BLOOD BY AUTOMATED COUNT: 45.7 FL (ref 35.9–50)
ETHANOL BLD-MCNC: 0.29 G/DL
GLUCOSE BLD-MCNC: 97 MG/DL (ref 65–99)
GLUCOSE SERPL-MCNC: 105 MG/DL (ref 65–99)
GLUCOSE UR STRIP.AUTO-MCNC: NEGATIVE MG/DL
HCT VFR BLD AUTO: 42.5 % (ref 37–47)
HGB BLD-MCNC: 13.9 G/DL (ref 12–16)
IMM GRANULOCYTES # BLD AUTO: 0.02 K/UL (ref 0–0.11)
IMM GRANULOCYTES NFR BLD AUTO: 0.3 % (ref 0–0.9)
INR PPP: 0.97 (ref 0.87–1.13)
KETONES UR STRIP.AUTO-MCNC: NEGATIVE MG/DL
LEUKOCYTE ESTERASE UR QL STRIP.AUTO: ABNORMAL
LIPASE SERPL-CCNC: 32 U/L (ref 7–58)
LYMPHOCYTES # BLD AUTO: 3.28 K/UL (ref 1–4.8)
LYMPHOCYTES NFR BLD: 42.4 % (ref 22–41)
MCH RBC QN AUTO: 32.1 PG (ref 27–33)
MCHC RBC AUTO-ENTMCNC: 32.7 G/DL (ref 33.6–35)
MCV RBC AUTO: 98.2 FL (ref 81.4–97.8)
MICRO URNS: ABNORMAL
MONOCYTES # BLD AUTO: 0.57 K/UL (ref 0–0.85)
MONOCYTES NFR BLD AUTO: 7.4 % (ref 0–13.4)
MUCOUS THREADS #/AREA URNS HPF: ABNORMAL /HPF
NEUTROPHILS # BLD AUTO: 3.62 K/UL (ref 2–7.15)
NEUTROPHILS NFR BLD: 46.8 % (ref 44–72)
NITRITE UR QL STRIP.AUTO: NEGATIVE
NRBC # BLD AUTO: 0 K/UL
NRBC BLD-RTO: 0 /100 WBC
OPIATES UR QL SCN: NEGATIVE
PCP UR QL SCN: NEGATIVE
PH UR STRIP.AUTO: 5.5 [PH]
PLATELET # BLD AUTO: 322 K/UL (ref 164–446)
PMV BLD AUTO: 9 FL (ref 9–12.9)
POTASSIUM SERPL-SCNC: 3.9 MMOL/L (ref 3.6–5.5)
PROT UR QL STRIP: NEGATIVE MG/DL
PROTHROMBIN TIME: 12.8 SEC (ref 12–14.6)
RBC # BLD AUTO: 4.33 M/UL (ref 4.2–5.4)
RBC # URNS HPF: ABNORMAL /HPF
RBC UR QL AUTO: ABNORMAL
SODIUM SERPL-SCNC: 140 MMOL/L (ref 135–145)
SP GR UR STRIP.AUTO: <=1.005
THC UR QL SCN: NEGATIVE
TROPONIN I SERPL-MCNC: <0.02 NG/ML (ref 0–0.04)
WBC # BLD AUTO: 7.7 K/UL (ref 4.8–10.8)
WBC #/AREA URNS HPF: ABNORMAL /HPF

## 2019-01-08 PROCEDURE — 80048 BASIC METABOLIC PNL TOTAL CA: CPT

## 2019-01-08 PROCEDURE — 70450 CT HEAD/BRAIN W/O DYE: CPT

## 2019-01-08 PROCEDURE — 84484 ASSAY OF TROPONIN QUANT: CPT

## 2019-01-08 PROCEDURE — 85730 THROMBOPLASTIN TIME PARTIAL: CPT

## 2019-01-08 PROCEDURE — 36415 COLL VENOUS BLD VENIPUNCTURE: CPT

## 2019-01-08 PROCEDURE — 70496 CT ANGIOGRAPHY HEAD: CPT

## 2019-01-08 PROCEDURE — 81001 URINALYSIS AUTO W/SCOPE: CPT | Mod: XU

## 2019-01-08 PROCEDURE — 80305 DRUG TEST PRSMV DIR OPT OBS: CPT

## 2019-01-08 PROCEDURE — 85610 PROTHROMBIN TIME: CPT

## 2019-01-08 PROCEDURE — 83690 ASSAY OF LIPASE: CPT

## 2019-01-08 PROCEDURE — 85025 COMPLETE CBC W/AUTO DIFF WBC: CPT

## 2019-01-08 PROCEDURE — 80307 DRUG TEST PRSMV CHEM ANLYZR: CPT

## 2019-01-08 PROCEDURE — 93005 ELECTROCARDIOGRAM TRACING: CPT | Performed by: EMERGENCY MEDICINE

## 2019-01-08 PROCEDURE — 70498 CT ANGIOGRAPHY NECK: CPT

## 2019-01-08 PROCEDURE — 99284 EMERGENCY DEPT VISIT MOD MDM: CPT

## 2019-01-08 PROCEDURE — 700117 HCHG RX CONTRAST REV CODE 255: Performed by: EMERGENCY MEDICINE

## 2019-01-08 PROCEDURE — 82962 GLUCOSE BLOOD TEST: CPT

## 2019-01-08 RX ORDER — NITROFURANTOIN 25; 75 MG/1; MG/1
100 CAPSULE ORAL 2 TIMES DAILY
Qty: 14 CAP | Refills: 0 | Status: SHIPPED | OUTPATIENT
Start: 2019-01-08 | End: 2019-01-15

## 2019-01-08 RX ADMIN — IOHEXOL 175 ML: 350 INJECTION, SOLUTION INTRAVENOUS at 02:26

## 2019-01-08 NOTE — DISCHARGE INSTRUCTIONS
There was no evidence of a stroke or an acute heart attack today, but your wife did have some EKG changes - and as we discussed it is extremely important that you get follow up with your primary care provider for a repeat EKG and echocardiogram

## 2019-01-08 NOTE — ED NOTES
Blood draw and sent to lab w/ urine sample. FSBS done at BS - 97;  ERP aware. EKG in process at the BS

## 2019-01-08 NOTE — ED PROVIDER NOTES
ED Provider Note    CHIEF COMPLAINT  Chief Complaint   Patient presents with   • Low Back Pain     Pt reports sever bilateral low back pain earlier tonight while laying in bed   • UTI     Pt reports feeling like she ha a posible UTI, pt denies any painful urination but has had some incontinence       HPI  Reina Gandara is a 68 y.o. female who presents to the emergency department with her  for which he states is altered mental status.  He states he did not notice until about an hour and a half ago and the patient came to go to bed she started complaining of some bladder discomfort but then he states she was just acting weird like she was confused and not quite coherent.  He states this is not like her she has not been speaking normally she is been very confused and constantly asking questions.  He is concerned because the patient has a history of a multiple aneurysms intracranial and an ICA which was repaired a few months ago.    The patient who is not a great historian who keeps stopping and starting her answers does state at this time that she was worried because she had a very severe headache that radiated down both sides of her neck and into her shoulders which she states happened before she went to bed and before [facility] arrived.  Then I asked her again if she is in any pain right now and she tells me she does not have any pain anywhere she just feels confused    REVIEW OF SYSTEMS  Positives as above. Pertinent negatives include vomiting difficulty ambulating  Otherwise history is limited secondary to poor historian on both sides  All other review of systems are negative    PAST MEDICAL HISTORY   has a past medical history of Contact dermatitis due to cosmetics (9/16/2015); Environmental and seasonal allergies; Guillain Barré syndrome (HCC); Hemorrhagic disorder (HCC); History of kidney stones; History of seizures; Hyperlipidemia; Hypertension; Intracranial aneurysm; PAD (peripheral artery  "disease) (5/7/2014); Psychiatric problem; Reactive depression (9/17/2013); Renal disorder; and Seizure (HCC) (1988).    SOCIAL HISTORY  Social History     Social History Main Topics   • Smoking status: Light Tobacco Smoker     Packs/day: 0.25     Years: 50.00     Types: Cigarettes   • Smokeless tobacco: Never Used      Comment: 1/2 ppd down to 4 cig/day since 4/13, down to 2 cig/day since 1014   • Alcohol use 0.6 oz/week     1 Glasses of wine per week      Comment: 3/week   • Drug use: No   • Sexual activity: Not Currently     Partners: Male      Comment: one son; ;  wk: insurance       SURGICAL HISTORY   has a past surgical history that includes tonsillectomy; wrist orif; abdominal hysterectomy total (1981); recovery (7/6/2013); femoral popliteal bypass (7/7/2013); other; other; recovery (10/4/2013); appendectomy; and thrombectomy (Right, 5/25/2018).    CURRENT MEDICATIONS  Home Medications    **Home medications have not yet been reviewed for this encounter**         ALLERGIES  Allergies   Allergen Reactions   • Bee Anaphylaxis   • Nsaids Swelling     2 trips to ER w reactions of rash swelling with difficulty breathing.   • Codeine Nausea     dizzy   • Keflex Vomiting     No trouble with amoxil       PHYSICAL EXAM  VITAL SIGNS: /77   Pulse (!) 103   Temp 36.6 °C (97.8 °F) (Temporal)   Resp 20   Ht 1.575 m (5' 2\")   Wt 40.8 kg (90 lb)   LMP  (LMP Unknown)   SpO2 95%   BMI 16.46 kg/m²    Pulse ox interpretation: I interpret this pulse ox as normal.  Constitutional: Alert in mild distress  HENT: Normocephalic atraumatic, MMM  Eyes: PER, Conjunctiva normal, Non-icteric.   Neck: Normal range of motion, No tenderness, Supple, No stridor.   Cardiovascular: Regular rate and rhythm, no murmurs.   Thorax & Lungs: Normal breath sounds, No respiratory distress, No wheezing, No chest tenderness.   Abdomen: Bowel sounds normal, Soft, No tenderness, No pulsatile masses. No peritoneal signs.  Skin: Warm, Dry, " No erythema, No rash.   Back: No bony tenderness, No CVA tenderness.   Extremities: Intact distal pulses, No edema, No tenderness, No cyanosis dp pulses 2+ b/l   Neurologic: Alert and oriented to place and name not month or year -   Symmetric smile, eyes shut tight bilaterally, forehead wrinkles bilaterally, sensation intact to light touch bilateral face, tongue midline, head turn and shoulder shrug with full strength. Hearing intact grossly bilaterally. 5/5  strength bilaterally, 5/5 tricep and bicep strength bilaterally. Sensation intact to light touch r, m, u, axillary nerves bilaterally. 5/5 strength quadricep, plantarflexion/dorsiflexion/extensor hallicus longus bilaterally. Sensation intact to light touch bilateral lower extremities in all nerve distributions. No clonus at ankle or elbow  Extreme difficulty following finger to nose - had to repeat myself multiple times still performed incorrectly, negative romberg    NIH 2 at this time     DIFFERENTIAL DIAGNOSIS AND WORK UP PLAN    This is a 68 y.o. female who presents with confused state and altered mental status according to the  this began earlier in the evening which is why he called 911 but she is very tearful and complaining of the belly pain but she was still not making sense and confused currently she is having a very hard time following two-step instructions she rambling and telling different parts of her story currently her NIH is 2 with a history of aneurysms and concern for subarachnoid hemorrhage especially in light of the fact she complained of a headache at some point during this process.  We will also evaluate for alcohol intoxication will perform CT head CT angiograms emergently and reassess at the bedside her Accu-Chek was 97    DIAGNOSTIC STUDIES / PROCEDURES    EKG  12- Lead EKG; interpreted by myself - Ileana  Normal sinus rhythm with a rate of 81 bpm.   Normal axis.  Normal intervals. LVH  No ST elevation or depression, or  abnormal T wave inversion   No widening of QRS complex   Good R wave progression   New q waves ant leads  New ant q waves with evidence of LVH  Clinical Impression: new anterior q waves and LVH but otherwise sinus and no evidence of acute ischemia        LABS  Pertinent Lab Findings  CBC within normal limits BMP within normal limits urinalysis with moderate bacteria and leuk esterase positive and without squamous cells negative troponin lipase normal coags normal urine drug screen alcohol level 0.29      RADIOLOGY  CT-CTA NECK WITH & W/O-POST PROCESSING   Final Result         1.  51% stenosis of the proximal right internal carotid artery.   2.  26% stenosis of the proximal left internal carotid artery.   3.  Mural thrombus or soft plaque along the left common carotid artery      CT-CTA HEAD WITH & W/O-POST PROCESS   Final Result         1.  No visualized aneurysm or occlusion readily identified. Diagnostic sensitivity of this examination is significantly diminished due to extensive streak and scatter artifacts      CT-HEAD W/O   Final Result         1.  No acute intracranial abnormality is identified, there are nonspecific white matter changes, commonly associated with small vessel ischemic disease.  Associated mild cerebral atrophy is noted. Examination limited as described        The radiologist's interpretation of all radiological studies have been reviewed by me.      COURSE & MEDICAL DECISION MAKING  Pertinent Labs & Imaging studies reviewed. (See chart for details)    2:15 AM  The patient is back from CT scan and her alcohol level is extremely elevated at .29 which is more than 3 times the legal limit    2:50 AM  I sat down with the patient and her  we had an extremely about what is been going on tonight.  Her nonfocal neurologic findings were likely secondary to her extreme alcohol intoxication for her age especially in light of no evidence of subarachnoid hemorrhage nor evidence of occlusion of any of  "her arterial vessels in the head or neck.  We also discussed that she does have some EKG changes but she is not having chest pain or shortness of breath and has a normal troponin and a mild urinary tract infection.  The patient is more alert and she is less confused than she was on arrival could be part of the due to metabolization.  I discussed with the  the options of keeping her overnight especially in light of EKG changes and that this is likely alcohol but could be something else underlying -were to go home as her confusion was most likely due to her alcohol in the setting of urinary tract infection with primary care follow-up for her EKG as she is not having any chest pain or shortness of breath and although this is a new finding is not likely the cause of her symptoms this evening.  I discussed the risk of cardiac injury or further stroke or further worsening of symptoms if they were to go home and they would be able to evaluated here  however would strongly like to take his wife home this evening.  He understands the risk of worsening symptoms such as chest pain or vomiting or worsening cardiac or vascular disease and wishes to take her home.  She will be started on oral outpatient antibiotics and will follow up with her primary care physician in the morning return to the ED within the next 24 hours for any new or worsening symptoms.  Again her nonfocal neurologic examination with an age of 1 mostly secondary to the difficulty following commands likely secondary to alcohol intoxication we did discuss could have get a TIA but he feels more likely that it was the alcohol    /59   Pulse 89   Temp 36.9 °C (98.5 °F) (Temporal)   Resp 16   Ht 1.575 m (5' 2\")   Wt 40.8 kg (90 lb)   LMP  (LMP Unknown)   SpO2 90%   BMI 16.46 kg/m²     The patient will return for new or worsening symptoms and is stable at the time of discharge.    The patient is referred to a primary physician for blood " pressure management, diabetic screening, and for all other preventative health concerns.    DISPOSITION:  Patient will be discharged home in stable condition.    FOLLOW UP:  Jessica Gentile M.D.  [address]  [place] [id number] [phone]    Schedule an appointment as soon as possible for a visit   please call tomorrow to discus repeat EKG and echocardiogram as discussed    [facility], Emergency Dept  [address]  [place] [id number] [phone]    If symptoms worsen - chest pain, shortness of breath      OUTPATIENT MEDICATIONS:  Discharge Medication List as of 1/8/2019  3:01 AM      START taking these medications    Details   nitrofurantoin monohydr macro (MACROBID) 100 MG Cap Take 1 Cap by mouth 2 times a day for 7 days., Disp-14 Cap, R-0, Print Rx Paper                 FINAL IMPRESSION  1. Alcoholic intoxication without complication (HCC)    2. Acute UTI    3. Abnormal EKG            Electronically signed by: Lindsey Tejeda, 1/8/2019 12:54 AM    This dictation has been created using voice recognition software and/or scribes. The accuracy of the dictation is limited by the abilities of the software and the expertise of the scribes. I expect there may be some errors of grammar and possibly content. I made every attempt to manually correct the errors within my dictation. However, errors related to voice recognition software and/or scribes may still exist and should be interpreted within the appropriate context.

## 2019-01-08 NOTE — ED TRIAGE NOTES
"Chief Complaint   Patient presents with   • Low Back Pain     Pt reports sever bilateral low back pain earlier tonight while laying in bed   • UTI     Pt reports feeling like she ha a posible UTI, pt denies any painful urination but has had some incontinence     /77   Pulse (!) 103   Temp 36.6 °C (97.8 °F) (Temporal)   Resp 20   Ht 1.575 m (5' 2\")   Wt 40.8 kg (90 lb)   LMP  (LMP Unknown)   SpO2 95%   BMI 16.46 kg/m²     Pt BIB [facility], PIV placed by EMS and 50 mcg of fentanyl admin by EMS in route to ER - pt stated having relief of pain from med. 12 lead completed by EMS - sinus tach shown.   "

## 2019-01-08 NOTE — ED NOTES
Pt and spouse given discharge paperwork and prescription, pt and spouse verbalized understanding all information given. Pt escorted out to car in wheel chair by RN w/ spouse.

## 2019-01-09 ENCOUNTER — PATIENT OUTREACH (OUTPATIENT)
Dept: HEALTH INFORMATION MANAGEMENT | Facility: OTHER | Age: 69
End: 2019-01-09

## 2019-01-13 LAB — EKG IMPRESSION: NORMAL

## 2019-01-15 ENCOUNTER — PATIENT OUTREACH (OUTPATIENT)
Dept: HEALTH INFORMATION MANAGEMENT | Facility: OTHER | Age: 69
End: 2019-01-15

## 2019-01-15 NOTE — PROGRESS NOTES
Patient already had her follow up appointment scheduled. Added AHA to her appointment, patient stated she would like to receive her her influenza.

## 2019-01-17 ENCOUNTER — OFFICE VISIT (OUTPATIENT)
Dept: MEDICAL GROUP | Facility: LAB | Age: 69
End: 2019-01-17
Payer: MEDICARE

## 2019-01-17 VITALS
HEART RATE: 100 BPM | TEMPERATURE: 99.3 F | DIASTOLIC BLOOD PRESSURE: 68 MMHG | HEIGHT: 61 IN | SYSTOLIC BLOOD PRESSURE: 120 MMHG | BODY MASS INDEX: 16.8 KG/M2 | OXYGEN SATURATION: 96 % | WEIGHT: 89 LBS | RESPIRATION RATE: 16 BRPM

## 2019-01-17 DIAGNOSIS — Z95.828 HISTORY OF LEFT COMMON CAROTID ARTERY STENT PLACEMENT: ICD-10-CM

## 2019-01-17 DIAGNOSIS — E44.0 MODERATE PROTEIN-CALORIE MALNUTRITION (HCC): ICD-10-CM

## 2019-01-17 DIAGNOSIS — I10 ESSENTIAL HYPERTENSION: ICD-10-CM

## 2019-01-17 DIAGNOSIS — I73.9 PAD (PERIPHERAL ARTERY DISEASE) (HCC): ICD-10-CM

## 2019-01-17 DIAGNOSIS — Z86.718 HISTORY OF FEMORAL ARTERY THROMBOSIS: ICD-10-CM

## 2019-01-17 DIAGNOSIS — Z98.890 HISTORY OF LEFT COMMON CAROTID ARTERY STENT PLACEMENT: ICD-10-CM

## 2019-01-17 DIAGNOSIS — R94.31 ABNORMAL EKG: ICD-10-CM

## 2019-01-17 DIAGNOSIS — F32.9 REACTIVE DEPRESSION: ICD-10-CM

## 2019-01-17 DIAGNOSIS — Z23 NEED FOR INFLUENZA VACCINATION: ICD-10-CM

## 2019-01-17 DIAGNOSIS — I65.23 BILATERAL CAROTID ARTERY STENOSIS: ICD-10-CM

## 2019-01-17 DIAGNOSIS — Z87.898 HISTORY OF SEIZURES: ICD-10-CM

## 2019-01-17 DIAGNOSIS — Z09 HOSPITAL DISCHARGE FOLLOW-UP: ICD-10-CM

## 2019-01-17 PROBLEM — I65.22 STENOSIS OF LEFT CAROTID ARTERY: Status: ACTIVE | Noted: 2019-01-17

## 2019-01-17 PROBLEM — I72.4 ANEURYSM OF ARTERY OF LOWER EXTREMITY (HCC): Status: RESOLVED | Noted: 2018-06-18 | Resolved: 2019-01-17

## 2019-01-17 PROBLEM — I65.22 STENOSIS OF LEFT CAROTID ARTERY: Status: RESOLVED | Noted: 2019-01-17 | Resolved: 2019-01-17

## 2019-01-17 PROCEDURE — 90662 IIV NO PRSV INCREASED AG IM: CPT | Performed by: NURSE PRACTITIONER

## 2019-01-17 PROCEDURE — 99215 OFFICE O/P EST HI 40 MIN: CPT | Mod: 25 | Performed by: NURSE PRACTITIONER

## 2019-01-17 PROCEDURE — G0008 ADMIN INFLUENZA VIRUS VAC: HCPCS | Performed by: NURSE PRACTITIONER

## 2019-01-17 RX ORDER — CLOPIDOGREL BISULFATE 75 MG/1
75 TABLET ORAL
Qty: 90 TAB | Refills: 1 | Status: SHIPPED | DISCHARGE
Start: 2019-01-17 | End: 2020-02-12

## 2019-01-17 RX ORDER — ESCITALOPRAM OXALATE 10 MG/1
10 TABLET ORAL DAILY
Qty: 30 TAB | Refills: 5 | Status: SHIPPED | OUTPATIENT
Start: 2019-01-17 | End: 2019-10-23

## 2019-01-17 NOTE — PROGRESS NOTES
Subjective:     Alisa is a 68 y.o. female here today for hospital f/u and Annual Health Assessment.    Her  tells me that he brought her to the emergency department on January 8 because of altered mental status at home, he was concerned that she was having a stroke.  He tells me that she was incoherent and stumbling at home although she was complaining also about lower abdominal and back pain.  She tells me that she had a few drinks that day because of her low back pain.  In the emergency department she was diagnosed with a UTI and alcohol intoxication.  She was treated with Macrobid 100 mg twice daily for 7 days which she completed and she tells me that all lower abdominal and back pain has resolved.  She did have a CTA of her head and neck which did not show any acute issues.  She does have known carotid artery stenosis, see below.    In terms of alcoholism, she has a history of problems with alcohol but has gone into remission a few times.  She tells me that over the past few months she has been having about 4 ounces of alcohol 3-4 times per week but now plans on completely stopping drinking again.  Her  is very supportive of this as well and she is part of a group that is helpful for her.  She does have depression, telling me that they are under a lot of financial stress.  She had stopped Paxil 9 months ago because it was making her feel very low energy levels and just drowsy.  She restarted Paxil last week after her ER episode.  She denies any suicidal or homicidal ideations.  She is sleeping well but struggling with her appetite as always.  She has a goal of gaining about 10-15 pounds over the next few months.  She is going to start an exercise program and tells me that she is eating healthy when she eats.  Denies any nausea, vomiting or diarrhea.    She has an extensive history with peripheral vascular disease and unfortunately still smokes.  She had a left ICA aneurysm that was coiled in October  2018.  Also has a history of a right CFA endarterectomy last summer with Dr. Durbin.  She continues on Plavix and aspirin.  She has a repeat scan tomorrow and in April and will potentially be stopping aspirin in April based on her vascular surgeon's advice.    She does not have a diagnoses of a hemorrhagic condition, denies any history of a ruptured aneurysm or gastrointestinal bleeding.    History of seizures: Last was in the 1980s, no recurrent seizures since.      Health Maintenance Summary                IMM HEP B VACCINE Overdue 6[birth year]22/1967     IMM ZOSTER VACCINES Overdue 10/1/2014      Done 8/6/2014 Imm Admin: Zoster Vaccine Live (ZVL) (Zostavax)    IMM INFLUENZA Overdue 9/1/2018      Done 1/16/2018 Imm Admin: Influenza Vaccine Adult HD     Patient has more history with this topic...    IMM DTaP/Tdap/Td Vaccine Postponed 7/10/2019 Originally 6/22/1969. Patient Refused    Annual Wellness Visit Next Due 6/19/2019      Done 6/18/2018 Visit Dx: Medicare annual wellness visit, subsequent     Patient has more history with this topic...    MAMMOGRAM Next Due 7/23/2019      Done 7/23/2018 MA-MAMMO DIAGNOSTIC BILAT W/TOMOSYNTHESIS W/CAD     Patient has more history with this topic...    BONE DENSITY Next Due 7/20/2020      Done 7/20/2015 DS-BONE DENSITY STUDY (DEXA)     Patient has more history with this topic...           Annual Health Assessment Questions:     1.  Are you currently engaging in any exercise or physical activity? No    2.  How would you describe your mood or emotional well-being today? depressed    3.  Have you had any falls in the last year? No    4.  Have you noticed any problems with your balance or had difficulty walking? No    5.  In the last six months have you experienced any leakage of urine? No    6. DPA/Advanced Directive: Patient does not have an Advanced Directive.  A packet and workshop information was given on Advanced Directives.    Current medicines (including changes today)  Current  Outpatient Prescriptions   Medication Sig Dispense Refill   • aspirin EC (ECOTRIN) 81 MG Tablet Delayed Response Take 81 mg by mouth every day.     • therapeutic multivitamin-minerals (THERAGRAN-M) Tab Take 1 Tab by mouth every day.     • atorvastatin (LIPITOR) 40 MG Tab TAKE 1 TABLET BY MOUTH EVERYDAY AT BEDTIME 90 Tab 1   • clopidogrel (PLAVIX) 75 MG Tab TAKE 1 TAB BY MOUTH EVERY DAY. 90 Tab 1   • Ascorbic Acid (VITAMIN C CR) 1000 MG Tab CR Take 1 Tab by mouth every day.     • naphazoline-pheniramine (OPCON-A) 0.027-0.315 % Solution Place 1 Drop in left eye 1 time daily as needed.       No current facility-administered medications for this visit.        She  has a past medical history of Contact dermatitis due to cosmetics (9/16/2015); Environmental and seasonal allergies; Guillain Barré syndrome (HCC); Hemorrhagic disorder (HCC); History of kidney stones; History of seizures; Hyperlipidemia; Hypertension; Intracranial aneurysm; PAD (peripheral artery disease) (5/7/2014); Psychiatric problem; Reactive depression (9/17/2013); Renal disorder; and Seizure (HCC) (1988).    Bee; Nsaids; Codeine; and Keflex    She  reports that she has been smoking Cigarettes.  She has a 12.50 pack-year smoking history. She has never used smokeless tobacco. She reports that she drinks about 0.6 oz of alcohol per week . She reports that she does not use drugs.  Ready to quit: Not Answered  Counseling given: Not Answered      Review Of Systems  Denies fever, chills, or sweats.  Skin: negative for rash, changing moles, abnormal pigmentation, hair or nail changes.  Eyes: negative for visual blurring, double vision, eye pain, floaters and discharge from eyes  Ears/Nose/Throat: negative for tinnitus, vertigo, oral or dental problem, hoarseness, frequent URI's, sinus trouble, persistent sore throat.  Denies snoring  Respiratory: She does have a dry cough, particularly in the morning.  Denies chronic shortness of breath or  "wheezing.  Cardiovascular: Denies chest pain or heart palpitations.  Denies peripheral edema.  Gastrointestinal: negative for dysphagia or odynophagia, nausea, heartburn or reflux, abdominal pain, hemorrhoids.  Has occasional constipation.    Genitourinary: negative for any further problems with lower abdominal discomfort.  Denies dysuria or hematuria.    Musculoskeletal: negative for joint swelling and muscle pain/ soreness  Neurologic: negative for new or changing headaches, new weakness tremor  Psychiatric: Positive for depression and anxiety.  Mentions a very low sex drive.  Endocrine: She does get cold easily.     Objective:     Physical Exam:  Blood pressure 120/68, pulse 100, temperature 37.4 °C (99.3 °F), resp. rate 16, height 1.549 m (5' 1\"), weight 40.4 kg (89 lb), SpO2 96 %, not currently breastfeeding. Body mass index is 16.82 kg/m².   Constitutional: Alert, no distress.  Skin: Warm, dry, good turgor, no rashes in visible areas.  Eye: Equal, round and reactive, conjunctiva clear, lids normal.  ENMT: Lips without lesions, good dentition, oropharynx clear.  Neck: Trachea midline, no masses, no thyromegaly. No cervical or supraclavicular lymphadenopathy.  Respiratory: Unlabored respiratory effort, lungs clear to auscultation, no wheezes, no rhonchi.  Cardiovascular: Normal rate and rhythm  Abdomen: Soft, non-tender, no masses, no hepatosplenomegaly.  Musculoskeletal: No lumbar spine tenderness.  Psych: Alert and oriented x3, normal affect and mood.    Assessment and Plan:   1. Hospital discharge follow-up  -reviewed pt's hospitalization with her and her  in depth including labs / imaging / discharge summary.  Their only question was regarding her abnormal EKG, see below.    2. Reactive depression  -Recommend starting escitalopram and discontinuing Paxil.  Discussed that fatigue and low energy is very common with Paxil.  I encouraged her to follow-up with me in about 3-1/2-4 weeks.  Discussed length " of onset efficacy of Lexapro.  Again she denies any suicidal or homicidal ideations.  Agree with her that discontinuation of alcohol intake well help improve her moods.  She will start an exercise program.  She may email me at any time.  - aspirin EC (ECOTRIN) 81 MG Tablet Delayed Response; Take 81 mg by mouth every day.  - escitalopram (LEXAPRO) 10 MG Tab; Take 1 Tab by mouth every day.  Dispense: 30 Tab; Refill: 5    3. PAD (peripheral artery disease) (HCC)  -Followed closely by vascular surgery.  Stable.  - clopidogrel (PLAVIX) 75 MG Tab; Take 1 Tab by mouth every day.  Dispense: 90 Tab; Refill: 1    4. Abnormal EKG  -Encouraged her to establish care with cardiology to assess the need for more intensive testing.  She was not having any acute chest pain today.  Does not recall any episodes in which she had a severe episode of chest pain or any recollection of being told that she had a heart attack or abnormal EKG in the past.  Fortunately she is on Plavix and aspirin.  I strongly encouraged her to stop smoking.  She is on statin therapy.  - REFERRAL TO CARDIOLOGY    5. Essential hypertension  -Stable.  Continue same.    6. History of seizures  -No history of seizures in over 30 years.    7. Moderate protein-calorie malnutrition (HCC)  -I had a long discussion about her diet and her weight.  She plans on incorporating more protein rich foods and healthy fats.  Her goal is to weigh around 105 pounds within 6 months.  She will follow-up with me in a few months.    8. Bilateral carotid artery stenosis  -Followed closely by vascular.  On Plavix and aspirin for now.    9. History of left common carotid artery stent placement  -As above.    10. Need for influenza vaccination  I have placed the below orders and discussed them with Dr. Gentile. the MA is performing the below orders under the direction of Dr. ALAMO  - Influenza Vaccine, High Dose (65+ Only)    11.  Alcohol abuse:  She understands her increased risk of death  related to alcohol use, particularly considering her Plavix and aspirin use.  She certainly is on a positive road to recovery and is no longer drinking.  She has close contacts with a alcohol support group and her  is very supportive.  Hopefully starting an exercise program and changing her antidepressant will also be helpful for her.  She will follow-up with me every few weeks on email and in person within 2-1/2-3 months.    Discussion today about general wellness and lifestyle habits:    · Engage in regular physical activity and social activities.  · Prevent falls and reduce trip hazards; using ambulatory aides, hearing and vision testing if appropriate.  · Steps to improve urinary incontinence.  · Advanced care planning.    Follow-Up: No Follow-up on file.         PLEASE NOTE: This dictation was created using voice recognition software. I have made every reasonable attempt to correct obvious errors, but I expect that there are errors of grammar and possibly content that I did not discover before finalizing the note.    Total face to face time 40 minutes of which over 50% of this visit is spent in counseling, education and outlining a plan of treatment and coordination of care for the above conditions. This included but was not limited to discussion of medication options and potential risks related to the medications, referral and specialty care options. All patient questions were answered

## 2019-01-23 ENCOUNTER — TELEPHONE (OUTPATIENT)
Dept: URGENT CARE | Facility: PHYSICIAN GROUP | Age: 69
End: 2019-01-23

## 2019-01-23 ENCOUNTER — OFFICE VISIT (OUTPATIENT)
Dept: URGENT CARE | Facility: PHYSICIAN GROUP | Age: 69
End: 2019-01-23
Payer: MEDICARE

## 2019-01-23 VITALS
DIASTOLIC BLOOD PRESSURE: 68 MMHG | RESPIRATION RATE: 18 BRPM | TEMPERATURE: 99.5 F | SYSTOLIC BLOOD PRESSURE: 142 MMHG | OXYGEN SATURATION: 99 % | HEART RATE: 89 BPM

## 2019-01-23 DIAGNOSIS — R04.0 EPISTAXIS: ICD-10-CM

## 2019-01-23 PROCEDURE — 30901 CONTROL OF NOSEBLEED: CPT | Mod: LT | Performed by: EMERGENCY MEDICINE

## 2019-01-23 PROCEDURE — 99204 OFFICE O/P NEW MOD 45 MIN: CPT | Mod: 25 | Performed by: EMERGENCY MEDICINE

## 2019-01-23 RX ORDER — DOXYCYCLINE HYCLATE 100 MG
100 TABLET ORAL 2 TIMES DAILY
Qty: 20 TAB | Refills: 0 | Status: SHIPPED | OUTPATIENT
Start: 2019-01-23 | End: 2019-10-23

## 2019-01-23 ASSESSMENT — ENCOUNTER SYMPTOMS
VOMITING: 0
SENSORY CHANGE: 0
CHILLS: 0
EYE DISCHARGE: 0
NAUSEA: 0
FEVER: 0
NERVOUS/ANXIOUS: 1
SINUS PAIN: 0
EYE REDNESS: 0
COUGH: 0
SPEECH CHANGE: 0
ABDOMINAL PAIN: 0
HEMOPTYSIS: 0

## 2019-01-23 NOTE — PROGRESS NOTES
Subjective:      Reina Gandara is a 68 y.o. female who presents with Epistaxis (x 2 am, on blood thinners)            HPI  Patient is a pleasant 68-year-old female with 2 days of nosebleed worse after acute onset last night approximately 2 AM.  Bleeding is primarily from her left side.  No known trauma or bruising.  No fever chills nausea vomiting or diarrhea.  Patient status post aneurysm on ASA as well as Plavix.    PMH:  has a past medical history of Contact dermatitis due to cosmetics (9/16/2015); Environmental and seasonal allergies; Guillain Barré syndrome (HCC); Hemorrhagic disorder (HCC); History of kidney stones; History of seizures; Hyperlipidemia; Hypertension; Intracranial aneurysm; PAD (peripheral artery disease) (5/7/2014); Psychiatric problem; Reactive depression (9/17/2013); Renal disorder; and Seizure (HCC) (1988).  MEDS:   Current Outpatient Prescriptions:   •  aspirin EC (ECOTRIN) 81 MG Tablet Delayed Response, Take 81 mg by mouth every day., Disp: , Rfl:   •  escitalopram (LEXAPRO) 10 MG Tab, Take 1 Tab by mouth every day., Disp: 30 Tab, Rfl: 5  •  clopidogrel (PLAVIX) 75 MG Tab, Take 1 Tab by mouth every day., Disp: 90 Tab, Rfl: 1  •  therapeutic multivitamin-minerals (THERAGRAN-M) Tab, Take 1 Tab by mouth every day., Disp: , Rfl:   •  naphazoline-pheniramine (OPCON-A) 0.027-0.315 % Solution, Place 1 Drop in left eye 1 time daily as needed., Disp: , Rfl:   •  atorvastatin (LIPITOR) 40 MG Tab, TAKE 1 TABLET BY MOUTH EVERYDAY AT BEDTIME, Disp: 90 Tab, Rfl: 1  •  Ascorbic Acid (VITAMIN C CR) 1000 MG Tab CR, Take 1 Tab by mouth every day., Disp: , Rfl:   ALLERGIES:   Allergies   Allergen Reactions   • Bee Anaphylaxis   • Nsaids Swelling     2 trips to ER w reactions of rash swelling with difficulty breathing.   • Codeine Nausea     dizzy   • Keflex Vomiting     No trouble with amoxil     SURGHX:   Past Surgical History:   Procedure Laterality Date   • THROMBECTOMY Right 5/25/2018     Procedure: FEMORAL ENDARTERECTOMY, ANGIOPLASTY WITH PATCH, FOREIGN BODY REMOVAL;  Surgeon: Carina Durbin M.D.;  Location: SURGERY [facility];  Service: General   • RECOVERY  10/4/2013    Performed by [facility] at SURGERY SAME DAY [facility]   • FEMORAL POPLITEAL BYPASS  7/7/2013    Performed by Carina Durbin M.D. at SURGERY [facility]   • RECOVERY  7/6/2013    Performed by [facility] at SURGERY [facility]   • ABDOMINAL HYSTERECTOMY TOTAL  1981    w/o BSO due to fibroids   • APPENDECTOMY      during hysterectomy   • OTHER      neuroma removed from feet 4-5x   • OTHER      abdominal stent   • TONSILLECTOMY     • WRIST ORIF       SOCHX:  reports that she has been smoking Cigarettes.  She has a 12.50 pack-year smoking history. She has never used smokeless tobacco. She reports that she drinks about 0.6 oz of alcohol per week . She reports that she does not use drugs.  FH: Reviewed with patient, not pertinent to this visit.   Review of Systems   Constitutional: Negative for chills and fever.   HENT: Positive for nosebleeds. Negative for congestion and sinus pain.    Eyes: Negative for discharge and redness.   Respiratory: Negative for cough and hemoptysis.    Cardiovascular: Negative for chest pain.   Gastrointestinal: Negative for abdominal pain, nausea and vomiting.   Neurological: Negative for sensory change and speech change.   Psychiatric/Behavioral: The patient is nervous/anxious.           Objective:     /68 (BP Location: Right arm, Patient Position: Sitting, BP Cuff Size: Adult)   Pulse 89   Temp 37.5 °C (99.5 °F) (Tympanic)   Resp 18   LMP  (LMP Unknown)   SpO2 99%      Physical Exam   Constitutional: She appears well-developed and well-nourished. She appears distressed.   HENT:   Head: Normocephalic and atraumatic.   Right Ear: External ear normal.   Patient is actively bleeding anteriorly from her left nares there is no active bleeding in her posterior  pharynx.  Patient initially coughed up 10-12 cm clot of dried blood from her left naris.  Because of the active bleeding a Rhino pad was placed with good control with the exception of minimal ooze.   Eyes: Conjunctivae are normal. Left eye exhibits no discharge.   Neck: Normal range of motion.   Cardiovascular: Normal rate.    Pulmonary/Chest: Breath sounds normal. No respiratory distress. She has no wheezes.   Abdominal: She exhibits no distension. There is no tenderness.   Musculoskeletal: Normal range of motion.   Neurological: She is alert.   Skin: Skin is warm. No erythema.   Psychiatric: She has a normal mood and affect. Her behavior is normal.   Nursing note and vitals reviewed.           Procedure unable to visualize bleeding after cleaning her nares of clots the briskness of the bleed therefore a Rhino patch was placed on the left nares and taped to her face.  Afterwards there is a small amount of ooze but no active bleeding.  Patient was observed for a time.,  Ambulated in the interiano as well as entered over to touch her feet to see whether recurrent epistaxis could be precipitated.  Assessment/Plan:     Diagnosis: Acute epistaxis left naris.    Patient will contact her primary care provider/specialist regarding continuing the Plavix or holding off.  She will return if unable to control her bleeding she is been given a nasal clamp in addition to her rhino pad in the left naris.  She is also been given a referral to ear nose and throat.    Addendum 2:40 PM I called to check on the patient and her nares was bleeding with small amount of ooze.  I recommended changing the pad as well as applying a clamp intermittently if this did not help recommend she be evaluated in the emergency department.

## 2019-02-18 DIAGNOSIS — M25.552 LEFT HIP PAIN: ICD-10-CM

## 2019-03-26 ENCOUNTER — PHYSICAL THERAPY (OUTPATIENT)
Dept: PHYSICAL THERAPY | Facility: REHABILITATION | Age: 69
End: 2019-03-26
Attending: NURSE PRACTITIONER
Payer: MEDICARE

## 2019-03-26 DIAGNOSIS — M25.552 LEFT HIP PAIN: ICD-10-CM

## 2019-03-26 PROCEDURE — 97110 THERAPEUTIC EXERCISES: CPT

## 2019-03-26 PROCEDURE — 97161 PT EVAL LOW COMPLEX 20 MIN: CPT

## 2019-03-26 ASSESSMENT — ENCOUNTER SYMPTOMS
PAIN SCALE AT HIGHEST: 3
PAIN SCALE AT LOWEST: 0
QUALITY: ACHING
PAIN SCALE: 0
QUALITY: SHARP

## 2019-03-26 NOTE — OP THERAPY EVALUATION
Outpatient Physical Therapy  INITIAL EVALUATION    [facility] [place]  [address], [address]  [place] [address]  Phone:  [phone]  Fax:  [phone]    Date of Evaluation: 2019    Patient: Reina Gandara  YOB: 1950  MRN: [id number]     Referring Provider: ALETA Mahajan  [address] [place] [address]   Referring Diagnosis Left hip pain [M25.552]     Time Calculation  Start time: 215  Stop time: 315 Time Calculation (min): 60 minutes     Physical Therapy Occurrence Codes    Date of onset of impairment:  19   Date physical therapy care plan established or reviewed:  3/26/19   Date physical therapy treatment started:  3/26/19          Chief Complaint:  L hip problem    Visit Diagnoses     ICD-10-CM   1. Left hip pain M25.552       Subjective:   History of Present Illness:     Mechanism of injury:  Reina Gandara is a 68 y.o. female that presents to therapy with R hip/groin and back pain. This has been ongoing for about a month with insidious onset.  Pt is active and has does her stairs into her office about 15 times a day. She was having unlrealenting pain until she started herbal antiinflammatory on a regularly basis.     Aggravating factors: turning toes out and walking     Relieving factors: resting, heat, ice, laying on back.     ADL limitations: limited by pain with activity, pain with twisting and turning while walking.    Pain:     Current pain ratin    At best pain ratin    At worst pain rating:  3    Quality:  Aching and sharp      Past Medical History:   Diagnosis Date   • Contact dermatitis due to cosmetics 2015   • Environmental and seasonal allergies    • Guillain Barré syndrome (HCC)     Hx    • Hemorrhagic disorder (HCC)     plavix   • History of kidney stones     x2 episodes   • History of seizures     x2 episodes - no treatment needed   • Hyperlipidemia    • Hypertension     • Intracranial aneurysm    • PAD (peripheral artery disease) 5/7/2014   • Psychiatric problem     depression   • Reactive depression 9/17/2013   • Renal disorder     stones   • Seizure (HCC) 1988    unknown cause     Past Surgical History:   Procedure Laterality Date   • THROMBECTOMY Right 5/25/2018    Procedure: FEMORAL ENDARTERECTOMY, ANGIOPLASTY WITH PATCH, FOREIGN BODY REMOVAL;  Surgeon: Carina Durbin M.D.;  Location: SURGERY [facility];  Service: General   • RECOVERY  10/4/2013    Performed by [facility] at SURGERY SAME DAY [facility]   • FEMORAL POPLITEAL BYPASS  7/7/2013    Performed by Carina Durbin M.D. at SURGERY [facility]   • RECOVERY  7/6/2013    Performed by [facility] at SURGERY [facility]   • ABDOMINAL HYSTERECTOMY TOTAL  1981    w/o BSO due to fibroids   • APPENDECTOMY      during hysterectomy   • OTHER      neuroma removed from feet 4-5x   • OTHER      abdominal stent   • TONSILLECTOMY     • WRIST ORIF       Social History   Substance Use Topics   • Smoking status: Light Tobacco Smoker     Packs/day: 0.25     Years: 50.00     Types: Cigarettes   • Smokeless tobacco: Never Used      Comment: 1/2 ppd down to 4 cig/day since 4/13, down to 2 cig/day since 1014   • Alcohol use 0.6 oz/week     1 Glasses of wine per week      Comment: 3/week     Family and Occupational History     Social History   • Marital status:      Spouse name: N/A   • Number of children: N/A   • Years of education: N/A     Occupational History   •  [employer]       Objective     Lumbar Screen  Lumbar range of motion within normal limits with the following exceptions:Extension limited approximately 50%, Rotation painful but WNL.    Neurological Testing     Sensation     Hip   Left Hip   Intact: light touch    Right Hip   Intact: light touch    Reflexes   Left   Clonus sign: negative    Right   Clonus sign: negative    Palpation     Additional Palpation  Details  No point tenderness, hyper or hypotonicity    Active Range of Motion   Left Hip   Flexion: WFL  Extension: 10 degrees with pain  Abduction: 45 degrees   Adduction: 20 degrees   External rotation (90/90): 45 degrees   Internal rotation (90/90): 45 degrees     Passive Range of Motion   Left Hip   Flexion: WFL  Extension: 15 degrees with pain  Abduction: WFL  Adduction: WFL  External rotation (90/90): WFL  Internal rotation (90/90): WFL    Joint Play   Left Hip     Posterior hip: within functional limits    Anterior hip: within functional limits    Lateral hip: within functional limits    Long axis distraction: within functional limits    Strength:      Left Hip   Planes of Motion   Flexion: 4-  Extension: 2-  Abduction: 4+  Adduction: 5  External rotation: 4+  Internal rotation: 4+    Right Hip   Planes of Motion   Flexion: 4  Extension: 4+  Abduction: 5  Adduction: 5  External rotation: 4+  Internal rotation: 4+    Tests     Left Hip   Negative Gaenslen's, Andrew, piriformis, scour, SI compression and SI distraction.         Therapeutic Exercises (CPT 46193):       Therapeutic Exercise Summary: HEP instruction/performance and development. Handout provided and exercises located below:  Access Code: [email or web address]   URL: [email or web address]   Date: 03/26/2019   Prepared by: Destin Abdalla      Exercises  · Supine Hip and Knee Flexion AROM with Swiss Ball - 40 reps - 2x daily - 7x weekly  · Hip Flexor Stretch at Edge of Bed - 2 reps - 20 hold - 2x daily - 7x weekly  · Straight Leg Raise - 10 reps - 2 sets - 1x daily - 7x weekly  · Clamshell - 15 reps - 2 sets - 1x daily - 7x weekly      Time-based treatments/modalities:  Therapeutic exercise minutes (CPT 88555): 15 minutes       Assessment, Response and Plan:   Assessment details:  Reina Gandara is a 68 y.o. female with signs and symptoms consistent with recovering hip impingement or tendonitis. She requires skilled physical therapy intervention to  decrease pain, increase strength , increase functional mobility, improve ADL completion and establish a home exercise program.  Goals:   Short Term Goals:   1. Patient will be Independent with prescribed Home Exercise Program (HEP) and will be able to demonstrate exercises without cues for improved overall symptoms/activity tolerance.   2. Pt will improve hip extension ROM to greater than 10 degrees for improved ambulation tolerance.  Short term goal time span:  1-2 weeks      Long Term Goals:    3. Pt will improve ability to ambulate and perform turns without increased pain during or afterwards > 2/10.  4. Pt will improve LEFS score to greater than 55/80 indicative of improved function and reduced perceived disability.  Long term goal time span:  2-4 weeks    Plan:   Planned therapy interventions:  Therapeutic Exercise (CPT 69681), Therapeutic Activities (CPT 21090), Manual Therapy (CPT 63249), Neuromuscular Re-education (CPT 35750) and E Stim Unattended (CPT 31180)  Frequency: 1-2x/week.  Duration in weeks:  6  Discussed with:  Patient    Functional Limitations and Severity Modifiers  PT Functional Assessment Tool Used: LEFS  PT Functional Assessment Score: 45/80     Referring provider co-signature:  I have reviewed this plan of care and my co-signature certifies the need for services.  Certification Dates:   From 03/26/19     To 5/7/19    Physician Signature: ________________________________ Date: ______________

## 2019-04-02 ENCOUNTER — PHYSICAL THERAPY (OUTPATIENT)
Dept: PHYSICAL THERAPY | Facility: REHABILITATION | Age: 69
End: 2019-04-02
Attending: NURSE PRACTITIONER
Payer: MEDICARE

## 2019-04-02 DIAGNOSIS — M25.552 LEFT HIP PAIN: ICD-10-CM

## 2019-04-02 PROCEDURE — 97110 THERAPEUTIC EXERCISES: CPT

## 2019-04-02 NOTE — OP THERAPY DAILY TREATMENT
Outpatient Physical Therapy  DAILY TREATMENT     [facility] [place]  [address], [address]  [place] [address]  Phone:  [phone]  Fax:  [phone]    Date: 04/02/2019    Patient: Reina Gandara  YOB: 1950  MRN: [id number]     Time Calculation  Start time: 1445  Stop time: 1520 Time Calculation (min): 35 minutes     Chief Complaint: Left hip problem    Visit #: 2    SUBJECTIVE:  Pt reports feeling better. Modified stretching to be hanging off of conference table. Not feeling any pain in her left hip lately.      OBJECTIVE:  Current objective measures: hip abduction strength 4+/5, flexion/4/5 without pain.           Therapeutic Exercises (CPT 75168):     3. Nu step, lvl 4 x 8min     4. HEP as below      Therapeutic Exercise Summary: HEP instruction/performance and development. Handout provided and exercises located below:  Access Code: [email or web address]   URL: [email or web address]   Date: 04/02/2019   Prepared by: Destin Abdalla      Exercises  · Supine Hip and Knee Flexion AROM with Swiss Ball - 40 reps - 2x daily - 7x weekly  · Hip Flexor Stretch at Edge of Bed - 2 reps - 20 hold - 2x daily - 7x weekly  · Straight Leg Raise - 10 reps - 2 sets - 1x daily - 7x weekly  · Clamshell - 15 reps - 2 sets - 1x daily - 7x weekly  · X Band Walk - 1x daily - 3x weekly  · Backward Step Down - 25 reps - 2 sets - 1x daily - 3x weekly      Time-based treatments/modalities:  Therapeutic exercise minutes (CPT 81223): 35 minutes       Pain rating before treatment: 0  Pain rating after treatment: 0    ASSESSMENT:   Response to treatment: improved hip strength since last visit. Pt to f/u in one week for advancement of HEP.     PLAN/RECOMMENDATIONS:   Plan for treatment: therapy treatment to continue next visit.  Planned interventions for next visit: continue with current treatment.

## 2019-04-04 ENCOUNTER — APPOINTMENT (OUTPATIENT)
Dept: PHYSICAL THERAPY | Facility: REHABILITATION | Age: 69
End: 2019-04-04
Attending: NURSE PRACTITIONER
Payer: MEDICARE

## 2019-04-09 ENCOUNTER — APPOINTMENT (OUTPATIENT)
Dept: PHYSICAL THERAPY | Facility: REHABILITATION | Age: 69
End: 2019-04-09
Attending: NURSE PRACTITIONER
Payer: MEDICARE

## 2019-04-11 ENCOUNTER — APPOINTMENT (OUTPATIENT)
Dept: PHYSICAL THERAPY | Facility: REHABILITATION | Age: 69
End: 2019-04-11
Attending: NURSE PRACTITIONER
Payer: MEDICARE

## 2019-04-16 ENCOUNTER — PHYSICAL THERAPY (OUTPATIENT)
Dept: PHYSICAL THERAPY | Facility: REHABILITATION | Age: 69
End: 2019-04-16
Attending: NURSE PRACTITIONER
Payer: MEDICARE

## 2019-04-16 DIAGNOSIS — M25.552 LEFT HIP PAIN: ICD-10-CM

## 2019-04-16 PROCEDURE — 97110 THERAPEUTIC EXERCISES: CPT

## 2019-04-16 NOTE — OP THERAPY DAILY TREATMENT
Outpatient Physical Therapy  DAILY TREATMENT     [facility] [place]  [address]  [place] [address]  Phone:  [phone]  Fax:  [phone]    Date: 04/16/2019    Patient: Reina Gandara  YOB: 1950  MRN: [id number]     Time Calculation  Start time: 0245  Stop time: 0308 Time Calculation (min): 23 minutes     Chief Complaint: Left hip problem    Visit #: 3    SUBJECTIVE:  R hip is hurting more than her Left not has not been painful for a while now. Wishes to cut today's session short because she is feeling very itchy and wants to take a benadryl at home.     OBJECTIVE:  Current objective measures: hip abduction strength 4+/5, flexion4+/5 without pain.         Therapeutic Exercises (CPT 87341):     1. Bridge x 10    2. Single leg bridge, 2x 5 each    3. Clamshell, lvl 2 x 15 each    4. X band walk, 10 feet x 5    5. Supine ball rolls, x 25      Time-based treatments/modalities:  Therapeutic exercise minutes (CPT 01226): 23 minutes       Pain rating before treatment: 0  Pain rating after treatment: 0    ASSESSMENT:   Response to treatment: continued improvement in hip strength since last visit. Treatment limited due to itching. F/u in one week for progression/possible discharge.       PLAN/RECOMMENDATIONS:   Plan for treatment: therapy treatment to continue next visit.  Planned interventions for next visit: continue with current treatment.

## 2019-04-18 ENCOUNTER — APPOINTMENT (OUTPATIENT)
Dept: PHYSICAL THERAPY | Facility: REHABILITATION | Age: 69
End: 2019-04-18
Attending: NURSE PRACTITIONER
Payer: MEDICARE

## 2019-04-26 ENCOUNTER — OFFICE VISIT (OUTPATIENT)
Dept: CARDIOLOGY | Facility: MEDICAL CENTER | Age: 69
End: 2019-04-26
Payer: MEDICARE

## 2019-04-26 VITALS
DIASTOLIC BLOOD PRESSURE: 68 MMHG | WEIGHT: 94 LBS | HEIGHT: 61 IN | BODY MASS INDEX: 17.75 KG/M2 | OXYGEN SATURATION: 94 % | SYSTOLIC BLOOD PRESSURE: 130 MMHG

## 2019-04-26 DIAGNOSIS — Z79.899 HIGH RISK MEDICATION USE: ICD-10-CM

## 2019-04-26 DIAGNOSIS — I73.9 PAD (PERIPHERAL ARTERY DISEASE) (HCC): ICD-10-CM

## 2019-04-26 DIAGNOSIS — Z72.0 TOBACCO ABUSE: ICD-10-CM

## 2019-04-26 DIAGNOSIS — R94.31 NONSPECIFIC ABNORMAL ELECTROCARDIOGRAM (ECG) (EKG): ICD-10-CM

## 2019-04-26 PROCEDURE — 99204 OFFICE O/P NEW MOD 45 MIN: CPT | Mod: 25 | Performed by: INTERNAL MEDICINE

## 2019-04-26 PROCEDURE — 99406 BEHAV CHNG SMOKING 3-10 MIN: CPT | Performed by: INTERNAL MEDICINE

## 2019-04-26 ASSESSMENT — ENCOUNTER SYMPTOMS
SPEECH CHANGE: 0
SENSORY CHANGE: 0
PALPITATIONS: 0
CHILLS: 0
DEPRESSION: 0
EYE DISCHARGE: 0
FALLS: 0
BLOOD IN STOOL: 0
SHORTNESS OF BREATH: 0
ORTHOPNEA: 0
HALLUCINATIONS: 0
NAUSEA: 0
BLURRED VISION: 0
DIZZINESS: 0
PND: 0
MYALGIAS: 0
WEIGHT LOSS: 0
CLAUDICATION: 0
COUGH: 0
LOSS OF CONSCIOUSNESS: 0
ABDOMINAL PAIN: 0
DOUBLE VISION: 0
BRUISES/BLEEDS EASILY: 0
HEADACHES: 0
FEVER: 0
EYE PAIN: 0
VOMITING: 0

## 2019-04-26 NOTE — LETTER
[facility]   [address]  [place] [address]  Phone: [phone]  Fax: [phone]              Reina Gnadara  1950    Encounter Date: 4/26/2019    Tavo Patel M.D.          PROGRESS NOTE:  Chief Complaint   Patient presents with   • Abnormal EKG       Subjective:   Reina Gandara is a 68 y.o. female who presents today for cardiac care and evaluation in our cardiology clinic because of abnormal EKG.  I personally reviewed the EKG which show evidence of LVH.  ST segment changes related to possible early repolarization.    Reina Gandara does not have any history of heart attack arrhythmias in the past. she never had transthoracic echocardiogram, cardiac catheterization or ablations procedure in the past. At this time, she denies having chest pain shortness of breath presyncopal syncopal episodes. she is able to exercise with walking for one to 2 miles without having problems of chest pain or shortness of breath. Patient is also able to climb up at least 2 flights of stairs without having symptoms.    Patient does have prior history of peripheral arterial disease for which she had left lower extremity bypass surgery.    Patient is on Plavix because of abnormal brain scan indicating possible prior stroke.    Past Medical History:   Diagnosis Date   • Contact dermatitis due to cosmetics 9/16/2015   • Environmental and seasonal allergies    • Guillain Barré syndrome (HCC)     Hx    • Hemorrhagic disorder (HCC)     plavix   • History of kidney stones     x2 episodes   • History of seizures     x2 episodes - no treatment needed   • Hyperlipidemia    • Hypertension    • Intracranial aneurysm    • PAD (peripheral artery disease) 5/7/2014   • Psychiatric problem     depression   • Reactive depression 9/17/2013   • Renal disorder     stones   • Seizure (HCC) 1988    unknown cause     Past Surgical History:      Procedure Laterality Date   • THROMBECTOMY Right 5/25/2018    Procedure: FEMORAL ENDARTERECTOMY, ANGIOPLASTY WITH PATCH, FOREIGN BODY REMOVAL;  Surgeon: Carina Durbin M.D.;  Location: SURGERY [facility];  Service: General   • RECOVERY  10/4/2013    Performed by [facility] at SURGERY SAME DAY [facility]   • FEMORAL POPLITEAL BYPASS  7/7/2013    Performed by Carina Durbin M.D. at SURGERY [facility]   • RECOVERY  7/6/2013    Performed by [facility] at SURGERY [facility]   • ABDOMINAL HYSTERECTOMY TOTAL  1981    w/o BSO due to fibroids   • APPENDECTOMY      during hysterectomy   • OTHER      neuroma removed from feet 4-5x   • OTHER      abdominal stent   • TONSILLECTOMY     • WRIST ORIF       Family History   Problem Relation Age of Onset   • Other Sister         tremors / instability   • Lung Disease Sister         COPD    • Hyperlipidemia Sister    • Other Paternal Grandfather         brain aneurysm     Social History     Social History   • Marital status:      Spouse name: N/A   • Number of children: N/A   • Years of education: N/A     Occupational History   •  [employer]     Social History Main Topics   • Smoking status: Light Tobacco Smoker     Packs/day: 0.25     Years: 50.00     Types: Cigarettes   • Smokeless tobacco: Never Used      Comment: 1/2 ppd down to 4 cig/day since 4/13, down to 2 cig/day since 1014   • Alcohol use 0.6 oz/week     1 Glasses of wine per week      Comment: 3/week   • Drug use: No   • Sexual activity: Not Currently     Partners: Male      Comment: one son; ;  wk: insurance     Other Topics Concern   • Not on file     Social History Narrative   • No narrative on file     Allergies   Allergen Reactions   • Bee Anaphylaxis   • Nsaids Swelling     2 trips to ER w reactions of rash swelling with difficulty breathing.   • Codeine Nausea     dizzy   • Keflex Vomiting     No trouble with amoxil     Outpatient  Encounter Prescriptions as of 4/26/2019   Medication Sig Dispense Refill   • clopidogrel (PLAVIX) 75 MG Tab Take 1 Tab by mouth every day. 90 Tab 1   • atorvastatin (LIPITOR) 40 MG Tab TAKE 1 TABLET BY MOUTH EVERYDAY AT BEDTIME 90 Tab 1   • doxycycline (VIBRAMYCIN) 100 MG Tab Take 1 Tab by mouth 2 times a day. (Patient not taking: Reported on 4/26/2019) 20 Tab 0   • aspirin EC (ECOTRIN) 81 MG Tablet Delayed Response Take 81 mg by mouth every day.     • escitalopram (LEXAPRO) 10 MG Tab Take 1 Tab by mouth every day. (Patient not taking: Reported on 4/26/2019) 30 Tab 5   • therapeutic multivitamin-minerals (THERAGRAN-M) Tab Take 1 Tab by mouth every day.     • naphazoline-pheniramine (OPCON-A) 0.027-0.315 % Solution Place 1 Drop in left eye 1 time daily as needed.     • Ascorbic Acid (VITAMIN C CR) 1000 MG Tab CR Take 1 Tab by mouth every day.       No facility-administered encounter medications on file as of 4/26/2019.      Review of Systems   Constitutional: Negative for chills, fever, malaise/fatigue and weight loss.   HENT: Negative for ear discharge, ear pain, hearing loss and nosebleeds.    Eyes: Negative for blurred vision, double vision, pain and discharge.   Respiratory: Negative for cough and shortness of breath.    Cardiovascular: Negative for chest pain, palpitations, orthopnea, claudication, leg swelling and PND.   Gastrointestinal: Negative for abdominal pain, blood in stool, melena, nausea and vomiting.   Genitourinary: Negative for dysuria and hematuria.   Musculoskeletal: Negative for falls, joint pain and myalgias.   Skin: Negative for itching and rash.   Neurological: Negative for dizziness, sensory change, speech change, loss of consciousness and headaches.   Endo/Heme/Allergies: Negative for environmental allergies. Does not bruise/bleed easily.   Psychiatric/Behavioral: Negative for depression, hallucinations and suicidal ideas.        Objective:   /68 (BP Location: Left arm, Patient  "Position: Sitting, BP Cuff Size: Adult)   Ht 1.549 m (5' 1\")   Wt 42.6 kg (94 lb)   LMP  (LMP Unknown)   SpO2 94%   BMI 17.76 kg/m²      Physical Exam   Constitutional: She is oriented to person, place, and time. No distress.   HENT:   Head: Normocephalic and atraumatic.   Right Ear: External ear normal.   Left Ear: External ear normal.   Eyes: Right eye exhibits no discharge. Left eye exhibits no discharge.   Neck: No JVD present. No thyromegaly present.   Cardiovascular: Normal rate, regular rhythm, normal heart sounds and intact distal pulses.  Exam reveals no gallop and no friction rub.    No murmur heard.  Pulmonary/Chest: Breath sounds normal. No respiratory distress.   Abdominal: Bowel sounds are normal. She exhibits no distension. There is no tenderness.   Musculoskeletal: She exhibits no edema or tenderness.   Neurological: She is alert and oriented to person, place, and time. No cranial nerve deficit.   Skin: Skin is warm and dry. She is not diaphoretic.   Psychiatric: She has a normal mood and affect. Her behavior is normal.   Nursing note and vitals reviewed.      Assessment:     1. Nonspecific abnormal electrocardiogram (ECG) (EKG)  EC-ECHOCARDIOGRAM COMPLETE W/O CONT   2. PAD (peripheral artery disease) (HCC) prior left LE bypass  EC-ECHOCARDIOGRAM COMPLETE W/O CONT   3. Tobacco abuse     4. High risk medication use         Medical Decision Making:  Today's Assessment / Status / Plan:   I will order transthoracic echocardiogram to assess for structural abnormalities.    I spent 5 minutes talking to patient about the danger of smoking. I advised patient and counseled patient on smoking cessation. Patient has promised to achieve goal of zero cigarettes per day.        Maria Eugenia Ceballos, A.P.N.  [id number]  [facility] [address]  VIA In Basket                 "

## 2019-04-26 NOTE — PROGRESS NOTES
Chief Complaint   Patient presents with   • Abnormal EKG       Subjective:   Reina Gandara is a 68 y.o. female who presents today for cardiac care and evaluation in our cardiology clinic because of abnormal EKG.  I personally reviewed the EKG which show evidence of LVH.  ST segment changes related to possible early repolarization.    Reina Gandara does not have any history of heart attack arrhythmias in the past. she never had transthoracic echocardiogram, cardiac catheterization or ablations procedure in the past. At this time, she denies having chest pain shortness of breath presyncopal syncopal episodes. she is able to exercise with walking for one to 2 miles without having problems of chest pain or shortness of breath. Patient is also able to climb up at least 2 flights of stairs without having symptoms.    Patient does have prior history of peripheral arterial disease for which she had left lower extremity bypass surgery.    Patient is on Plavix because of abnormal brain scan indicating possible prior stroke.    Past Medical History:   Diagnosis Date   • Contact dermatitis due to cosmetics 9/16/2015   • Environmental and seasonal allergies    • Guillain Barré syndrome (HCC)     Hx    • Hemorrhagic disorder (HCC)     plavix   • History of kidney stones     x2 episodes   • History of seizures     x2 episodes - no treatment needed   • Hyperlipidemia    • Hypertension    • Intracranial aneurysm    • PAD (peripheral artery disease) 5/7/2014   • Psychiatric problem     depression   • Reactive depression 9/17/2013   • Renal disorder     stones   • Seizure (HCC) 1988    unknown cause     Past Surgical History:   Procedure Laterality Date   • THROMBECTOMY Right 5/25/2018    Procedure: FEMORAL ENDARTERECTOMY, ANGIOPLASTY WITH PATCH, FOREIGN BODY REMOVAL;  Surgeon: Carina Durbin M.D.;  Location: SURGERY [facility];  Service: General   • RECOVERY  10/4/2013    Performed by [facility]  Surgery at SURGERY SAME DAY [facility] ORS   • FEMORAL POPLITEAL BYPASS  7/7/2013    Performed by Carina Durbin M.D. at SURGERY [facility] ORS   • RECOVERY  7/6/2013    Performed by [facility] at SURGERY [facility] ORS   • ABDOMINAL HYSTERECTOMY TOTAL  1981    w/o BSO due to fibroids   • APPENDECTOMY      during hysterectomy   • OTHER      neuroma removed from feet 4-5x   • OTHER      abdominal stent   • TONSILLECTOMY     • WRIST ORIF       Family History   Problem Relation Age of Onset   • Other Sister         tremors / instability   • Lung Disease Sister         COPD    • Hyperlipidemia Sister    • Other Paternal Grandfather         brain aneurysm     Social History     Social History   • Marital status:      Spouse name: N/A   • Number of children: N/A   • Years of education: N/A     Occupational History   •  [employer]     Social History Main Topics   • Smoking status: Light Tobacco Smoker     Packs/day: 0.25     Years: 50.00     Types: Cigarettes   • Smokeless tobacco: Never Used      Comment: 1/2 ppd down to 4 cig/day since 4/13, down to 2 cig/day since 1014   • Alcohol use 0.6 oz/week     1 Glasses of wine per week      Comment: 3/week   • Drug use: No   • Sexual activity: Not Currently     Partners: Male      Comment: one son; ;  wk: insurance     Other Topics Concern   • Not on file     Social History Narrative   • No narrative on file     Allergies   Allergen Reactions   • Bee Anaphylaxis   • Nsaids Swelling     2 trips to ER w reactions of rash swelling with difficulty breathing.   • Codeine Nausea     dizzy   • Keflex Vomiting     No trouble with amoxil     Outpatient Encounter Prescriptions as of 4/26/2019   Medication Sig Dispense Refill   • clopidogrel (PLAVIX) 75 MG Tab Take 1 Tab by mouth every day. 90 Tab 1   • atorvastatin (LIPITOR) 40 MG Tab TAKE 1 TABLET BY MOUTH EVERYDAY AT BEDTIME 90 Tab 1   • doxycycline (VIBRAMYCIN) 100 MG Tab Take 1  "Tab by mouth 2 times a day. (Patient not taking: Reported on 4/26/2019) 20 Tab 0   • aspirin EC (ECOTRIN) 81 MG Tablet Delayed Response Take 81 mg by mouth every day.     • escitalopram (LEXAPRO) 10 MG Tab Take 1 Tab by mouth every day. (Patient not taking: Reported on 4/26/2019) 30 Tab 5   • therapeutic multivitamin-minerals (THERAGRAN-M) Tab Take 1 Tab by mouth every day.     • naphazoline-pheniramine (OPCON-A) 0.027-0.315 % Solution Place 1 Drop in left eye 1 time daily as needed.     • Ascorbic Acid (VITAMIN C CR) 1000 MG Tab CR Take 1 Tab by mouth every day.       No facility-administered encounter medications on file as of 4/26/2019.      Review of Systems   Constitutional: Negative for chills, fever, malaise/fatigue and weight loss.   HENT: Negative for ear discharge, ear pain, hearing loss and nosebleeds.    Eyes: Negative for blurred vision, double vision, pain and discharge.   Respiratory: Negative for cough and shortness of breath.    Cardiovascular: Negative for chest pain, palpitations, orthopnea, claudication, leg swelling and PND.   Gastrointestinal: Negative for abdominal pain, blood in stool, melena, nausea and vomiting.   Genitourinary: Negative for dysuria and hematuria.   Musculoskeletal: Negative for falls, joint pain and myalgias.   Skin: Negative for itching and rash.   Neurological: Negative for dizziness, sensory change, speech change, loss of consciousness and headaches.   Endo/Heme/Allergies: Negative for environmental allergies. Does not bruise/bleed easily.   Psychiatric/Behavioral: Negative for depression, hallucinations and suicidal ideas.        Objective:   /68 (BP Location: Left arm, Patient Position: Sitting, BP Cuff Size: Adult)   Ht 1.549 m (5' 1\")   Wt 42.6 kg (94 lb)   LMP  (LMP Unknown)   SpO2 94%   BMI 17.76 kg/m²     Physical Exam   Constitutional: She is oriented to person, place, and time. No distress.   HENT:   Head: Normocephalic and atraumatic.   Right Ear: " External ear normal.   Left Ear: External ear normal.   Eyes: Right eye exhibits no discharge. Left eye exhibits no discharge.   Neck: No JVD present. No thyromegaly present.   Cardiovascular: Normal rate, regular rhythm, normal heart sounds and intact distal pulses.  Exam reveals no gallop and no friction rub.    No murmur heard.  Pulmonary/Chest: Breath sounds normal. No respiratory distress.   Abdominal: Bowel sounds are normal. She exhibits no distension. There is no tenderness.   Musculoskeletal: She exhibits no edema or tenderness.   Neurological: She is alert and oriented to person, place, and time. No cranial nerve deficit.   Skin: Skin is warm and dry. She is not diaphoretic.   Psychiatric: She has a normal mood and affect. Her behavior is normal.   Nursing note and vitals reviewed.      Assessment:     1. Nonspecific abnormal electrocardiogram (ECG) (EKG)  EC-ECHOCARDIOGRAM COMPLETE W/O CONT   2. PAD (peripheral artery disease) (HCC) prior left LE bypass  EC-ECHOCARDIOGRAM COMPLETE W/O CONT   3. Tobacco abuse     4. High risk medication use         Medical Decision Making:  Today's Assessment / Status / Plan:   I will order transthoracic echocardiogram to assess for structural abnormalities.    I spent 5 minutes talking to patient about the danger of smoking. I advised patient and counseled patient on smoking cessation. Patient has promised to achieve goal of zero cigarettes per day.

## 2019-05-03 ENCOUNTER — APPOINTMENT (OUTPATIENT)
Dept: PHYSICAL THERAPY | Facility: REHABILITATION | Age: 69
End: 2019-05-03
Payer: MEDICARE

## 2019-05-03 NOTE — OP THERAPY DAILY TREATMENT
Outpatient Physical Therapy  DAILY TREATMENT     [facility]  [address], [address]  [facility] [address]  Phone:  [phone]  Fax:  [phone]    Date: 05/03/2019    Patient: Reina Gandara  YOB: 1950  MRN: [id number]     Time Calculation             Chief Complaint: Left hip problem    Visit #: 4    SUBJECTIVE:  R hip is hurting more than her Left not has not been painful for a while now. Wishes to cut today's session short because she is feeling very itchy and wants to take a benadryl at home.     OBJECTIVE:  Current objective measures: hip abduction strength 4+/5, flexion4+/5 without pain.         Therapeutic Exercises (CPT 70384):     1. Bridge x 10    2. Single leg bridge, 2x 5 each    3. Clamshell, lvl 2 x 15 each    4. X band walk, 10 feet x 5    5. Supine ball rolls, x 25      Time-based treatments/modalities:          Pain rating before treatment: 0  Pain rating after treatment: 0    ASSESSMENT:   Response to treatment: continued improvement in hip strength since last visit. Treatment limited due to itching. F/u in one week for progression/possible discharge.       PLAN/RECOMMENDATIONS:   Plan for treatment: therapy treatment to continue next visit.  Planned interventions for next visit: continue with current treatment.

## 2019-05-06 ENCOUNTER — APPOINTMENT (OUTPATIENT)
Dept: PHYSICAL THERAPY | Facility: REHABILITATION | Age: 69
End: 2019-05-06
Payer: MEDICARE

## 2019-05-10 ENCOUNTER — APPOINTMENT (OUTPATIENT)
Dept: PHYSICAL THERAPY | Facility: REHABILITATION | Age: 69
End: 2019-05-10
Payer: MEDICARE

## 2019-05-15 ENCOUNTER — APPOINTMENT (OUTPATIENT)
Dept: PHYSICAL THERAPY | Facility: REHABILITATION | Age: 69
End: 2019-05-15
Payer: MEDICARE

## 2019-05-20 ENCOUNTER — APPOINTMENT (OUTPATIENT)
Dept: CARDIOLOGY | Facility: MEDICAL CENTER | Age: 69
End: 2019-05-20
Attending: INTERNAL MEDICINE
Payer: MEDICARE

## 2019-05-21 ENCOUNTER — HOSPITAL ENCOUNTER (OUTPATIENT)
Dept: CARDIOLOGY | Facility: MEDICAL CENTER | Age: 69
End: 2019-05-21
Attending: INTERNAL MEDICINE
Payer: MEDICARE

## 2019-05-21 DIAGNOSIS — I73.9 PAD (PERIPHERAL ARTERY DISEASE) (HCC): ICD-10-CM

## 2019-05-21 DIAGNOSIS — R94.31 NONSPECIFIC ABNORMAL ELECTROCARDIOGRAM (ECG) (EKG): ICD-10-CM

## 2019-05-21 LAB
LV EJECT FRACT  99904: 65
LV EJECT FRACT MOD 2C 99903: 45.84
LV EJECT FRACT MOD 4C 99902: 55.75
LV EJECT FRACT MOD BP 99901: 56.02

## 2019-05-21 PROCEDURE — 93306 TTE W/DOPPLER COMPLETE: CPT | Mod: 26 | Performed by: INTERNAL MEDICINE

## 2019-05-21 PROCEDURE — 93306 TTE W/DOPPLER COMPLETE: CPT

## 2019-06-04 ENCOUNTER — TELEPHONE (OUTPATIENT)
Dept: PHYSICAL THERAPY | Facility: REHABILITATION | Age: 69
End: 2019-06-04

## 2019-06-04 DIAGNOSIS — I73.9 PAD (PERIPHERAL ARTERY DISEASE) (HCC): ICD-10-CM

## 2019-06-04 RX ORDER — CLOPIDOGREL BISULFATE 75 MG/1
TABLET ORAL
Qty: 90 TAB | Refills: 1 | Status: SHIPPED | OUTPATIENT
Start: 2019-06-04 | End: 2019-12-09 | Stop reason: SDUPTHER

## 2019-06-04 NOTE — TELEPHONE ENCOUNTER
Was the patient seen in the last year in this department? Yes1/17/2019    Does patient have an active prescription for medications requested? No     Received Request Via: Pharmacy

## 2019-06-04 NOTE — OP THERAPY DISCHARGE SUMMARY
Outpatient Physical Therapy  DISCHARGE SUMMARY NOTE      [facility]  [address], [address]  [facility] [address]  Phone:  [phone]  Fax:  [phone]    Date of Visit: 06/04/2019    Patient: Reina Gandara  YOB: 1950  MRN: [id number]     Referring Provider: ALETA Mahajan   Referring Diagnosis Left hip pain [M25.552]     Physical Therapy Occurrence Codes    Date of onset of impairment:  2/26/19   Date physical therapy care plan established or reviewed:  3/26/19   Date physical therapy treatment started:  3/26/19            Your patient is being discharged from Physical Therapy with the following comments:   · Patient has failed to schedule or reschedule follow-up visits    Comments:  Reina Gandara attended 3 physical therapy sessions with overall improving symptoms. She failed to return to the clinic following her third visit. She has been discharged due to a lapse in care greater than 30 days. Thank you for the opportunity to assist you and your patient.     Limitations Remaining:  hip abduction strength 4+/5, flexion4+/5 on last visit.     Recommendations:  Continue HEP, f/u with pcp as needed    Destin Abdalla, PT, DPT    Date: 6/4/2019

## 2019-08-02 ENCOUNTER — TELEPHONE (OUTPATIENT)
Dept: CARDIOLOGY | Facility: MEDICAL CENTER | Age: 69
End: 2019-08-02

## 2019-08-02 NOTE — TELEPHONE ENCOUNTER
S/w patient about fasting labs, she stated that she will be completing them before her appt with Dr. Patel on 08/05.

## 2019-08-03 ENCOUNTER — HOSPITAL ENCOUNTER (OUTPATIENT)
Dept: LAB | Facility: MEDICAL CENTER | Age: 69
End: 2019-08-03
Attending: INTERNAL MEDICINE
Payer: MEDICARE

## 2019-08-03 DIAGNOSIS — Z79.899 HIGH RISK MEDICATION USE: ICD-10-CM

## 2019-08-03 DIAGNOSIS — I73.9 PAD (PERIPHERAL ARTERY DISEASE) (HCC): ICD-10-CM

## 2019-08-03 DIAGNOSIS — R94.31 NONSPECIFIC ABNORMAL ELECTROCARDIOGRAM (ECG) (EKG): ICD-10-CM

## 2019-08-03 DIAGNOSIS — Z72.0 TOBACCO ABUSE: ICD-10-CM

## 2019-08-03 LAB
ALBUMIN SERPL BCP-MCNC: 4.2 G/DL (ref 3.2–4.9)
ALBUMIN/GLOB SERPL: 1.6 G/DL
ALP SERPL-CCNC: 88 U/L (ref 30–99)
ALT SERPL-CCNC: 11 U/L (ref 2–50)
ANION GAP SERPL CALC-SCNC: 5 MMOL/L (ref 0–11.9)
AST SERPL-CCNC: 17 U/L (ref 12–45)
BILIRUB SERPL-MCNC: 0.6 MG/DL (ref 0.1–1.5)
BUN SERPL-MCNC: 12 MG/DL (ref 8–22)
CALCIUM SERPL-MCNC: 9.4 MG/DL (ref 8.5–10.5)
CHLORIDE SERPL-SCNC: 105 MMOL/L (ref 96–112)
CHOLEST SERPL-MCNC: 314 MG/DL (ref 100–199)
CO2 SERPL-SCNC: 28 MMOL/L (ref 20–33)
CREAT SERPL-MCNC: 0.61 MG/DL (ref 0.5–1.4)
GLOBULIN SER CALC-MCNC: 2.7 G/DL (ref 1.9–3.5)
GLUCOSE SERPL-MCNC: 88 MG/DL (ref 65–99)
HDLC SERPL-MCNC: 62 MG/DL
LDLC SERPL CALC-MCNC: 208 MG/DL
POTASSIUM SERPL-SCNC: 4.3 MMOL/L (ref 3.6–5.5)
PROT SERPL-MCNC: 6.9 G/DL (ref 6–8.2)
SODIUM SERPL-SCNC: 138 MMOL/L (ref 135–145)
TRIGL SERPL-MCNC: 219 MG/DL (ref 0–149)

## 2019-08-03 PROCEDURE — 80061 LIPID PANEL: CPT

## 2019-08-03 PROCEDURE — 80053 COMPREHEN METABOLIC PANEL: CPT

## 2019-08-03 PROCEDURE — 36415 COLL VENOUS BLD VENIPUNCTURE: CPT

## 2019-08-08 NOTE — TELEPHONE ENCOUNTER
Pt apologized for missing 8/5/19 appt with TT, but said that their car wouldn't start and they couldn't get through to the office to notify us. She saw her cholesterol results on Mychart and doesn't believe that the results should be that elevated. Informed her that her results were elevated a year ago as well, so it's not completely unbelievable that they are still elevated. Suggested that she reschedule her 8/5/19 appt so that med adjustments can be made and re draws ordered if appropriate. She is agreeable to this. Provided her with schedulers number and transferred her to set this up.

## 2019-08-08 NOTE — TELEPHONE ENCOUNTER
FELICE/Leatha      Patient said she received her lab results and she said the results are way off and she doesn't believe they are her results. She thinks the lab got her blood samples mixed up with someone else and she is requesting a new lab order. She can be reached at [phone].

## 2019-10-16 ENCOUNTER — TELEPHONE (OUTPATIENT)
Dept: MEDICAL GROUP | Facility: MEDICAL CENTER | Age: 69
End: 2019-10-16

## 2019-10-16 RX ORDER — PAROXETINE 10 MG/1
10 TABLET, FILM COATED ORAL DAILY
COMMUNITY
End: 2020-01-06

## 2019-10-16 NOTE — TELEPHONE ENCOUNTER
Future Appointments       Provider Department Center    10/23/2019 1:20 PM BEN Mahajan.; [facility]           ANNUAL WELLNESS VISIT PRE-VISIT PLANNING WITHOUT OUTREACH    1.  Reviewed note from last office visit with PCP: YES    2.  If any orders were placed at last visit, do we have Results/Consult Notes?        •  Labs - Labs ordered, completed on 8/3/19 and results are in chart.  Note: If patient appointment is for lab review and patient did not complete labs, check with provider if OK to reschedule patient until labs completed.       •  Imaging - Imaging ordered, NOT completed. Patient advised to complete prior to next appointment.       •  Referrals - No referrals were ordered at last office visit.    3.  Immunizations were updated in [employer] using WebIZ?: Yes       •  WebIZ Recommendations: FLU, HEPATITIS B, TDAP and SHINGRIX (Shingles)        •  Is patient due for Tdap? YES. Patient was notified of copay/out of pocket cost.       •  Is patient due for Shingrix? YES. Patient was notified of copay/out of pocket cost.     4.  Patient is due for the following Health Maintenance Topics:   Health Maintenance Due   Topic Date Due   • HEPATITIS C SCREENING  1950   • IMM HEP B VACCINE (1 of 3 - Risk 3-dose series) 06/22/1967   • IMM DTaP/Tdap/Td Vaccine (1 - Tdap) 06/22/1967   • IMM ZOSTER VACCINES (2 of 3) 10/01/2014   • Annual Wellness Visit  06/19/2019   • MAMMOGRAM  07/23/2019   • IMM INFLUENZA (1) 09/01/2019       - Patient already has appointment scheduled for Annual Wellness Visit (AWV).    5.  Reviewed/Updated the following with patient:       •   Preferred Pharmacy? YES       •   Preferred Lab? YES       •   Preferred Communication? YES       •   Allergies? YES       •   Medications? YES. Was Abstract Encounter opened and chart updated? YES       •   Social History? YES. Was Abstract Encounter opened and chart updated? YES       •   Family  History (document living status of immediate family members and if + hx of  cancer, diabetes, hypertension, hyperlipidemia, heart attack, stroke) YES. Was Abstract Encounter opened and chart updated? YES    6.  Care Team Updated:       •   DME Company (gait device, O2, CPAP, etc.): N\A       •   Other Specialists (eye doctor, derm, GYN, cardiology, endo, etc): YES    7. Orders for overdue Health Maintenance topics pended in Pre-Charting? YES    8.  Patient has the following Care Path diagnoses on Problem List:  DEPRESSION     Is patient seeing a counselor, psychiatrist or other healthcare provider regarding their mental health? No, and not interested.     Depression Screen (PHQ-2/PHQ-9) 6/18/2018 10/22/2018 10/23/2018   PHQ-2 Total Score - 0 0   PHQ-2 Total Score - - -   PHQ-2 Total Score 1 - -   PHQ-9 Total Score 5 - -       Interpretation of PHQ-9 Total Score   Score Severity   1-4 No Depression   5-9 Mild Depression   10-14 Moderate Depression   15-19 Moderately Severe Depression   20-27 Severe Depression      9.  Patient was advised: “This is a free wellness visit. The provider will screen for medical conditions to help you stay healthy. If you have other concerns to address you may be asked to discuss these at a separate visit or there may be an additional fee.”     10.  Patient was informed to arrive 15 min prior to their scheduled appointment and bring in their medication bottles.

## 2019-10-23 ENCOUNTER — OFFICE VISIT (OUTPATIENT)
Dept: MEDICAL GROUP | Facility: LAB | Age: 69
End: 2019-10-23
Payer: MEDICARE

## 2019-10-23 ENCOUNTER — TELEPHONE (OUTPATIENT)
Dept: MEDICAL GROUP | Facility: LAB | Age: 69
End: 2019-10-23

## 2019-10-23 VITALS
HEIGHT: 61 IN | RESPIRATION RATE: 14 BRPM | TEMPERATURE: 99.4 F | OXYGEN SATURATION: 95 % | SYSTOLIC BLOOD PRESSURE: 130 MMHG | HEART RATE: 95 BPM | DIASTOLIC BLOOD PRESSURE: 64 MMHG | BODY MASS INDEX: 18.31 KG/M2 | WEIGHT: 97 LBS

## 2019-10-23 DIAGNOSIS — I10 ESSENTIAL HYPERTENSION: ICD-10-CM

## 2019-10-23 DIAGNOSIS — Z72.0 TOBACCO ABUSE: ICD-10-CM

## 2019-10-23 DIAGNOSIS — Z00.00 MEDICARE ANNUAL WELLNESS VISIT, SUBSEQUENT: ICD-10-CM

## 2019-10-23 DIAGNOSIS — Z98.890 HISTORY OF LEFT COMMON CAROTID ARTERY STENT PLACEMENT: ICD-10-CM

## 2019-10-23 DIAGNOSIS — Z95.828 HISTORY OF LEFT COMMON CAROTID ARTERY STENT PLACEMENT: ICD-10-CM

## 2019-10-23 DIAGNOSIS — R79.89 ABNORMAL TSH: ICD-10-CM

## 2019-10-23 DIAGNOSIS — F32.9 REACTIVE DEPRESSION: ICD-10-CM

## 2019-10-23 DIAGNOSIS — I67.1 ANEURYSM OF LEFT INTERNAL CAROTID ARTERY: ICD-10-CM

## 2019-10-23 DIAGNOSIS — E44.0 MODERATE PROTEIN-CALORIE MALNUTRITION (HCC): ICD-10-CM

## 2019-10-23 DIAGNOSIS — I70.201 FEMORAL ARTERY OCCLUSION, RIGHT (HCC): ICD-10-CM

## 2019-10-23 DIAGNOSIS — Z91.030 H/O BEE STING ALLERGY: ICD-10-CM

## 2019-10-23 DIAGNOSIS — I67.1 ANEURYSM OF INTERNAL CAROTID ARTERY: ICD-10-CM

## 2019-10-23 DIAGNOSIS — E78.5 DYSLIPIDEMIA: ICD-10-CM

## 2019-10-23 DIAGNOSIS — Z23 NEED FOR INFLUENZA VACCINATION: ICD-10-CM

## 2019-10-23 DIAGNOSIS — Z86.718 HISTORY OF FEMORAL ARTERY THROMBOSIS: ICD-10-CM

## 2019-10-23 DIAGNOSIS — I73.9 PAD (PERIPHERAL ARTERY DISEASE) (HCC): ICD-10-CM

## 2019-10-23 DIAGNOSIS — Z87.442 HISTORY OF KIDNEY STONES: ICD-10-CM

## 2019-10-23 DIAGNOSIS — L24.89 IRRITANT CONTACT DERMATITIS DUE TO OTHER AGENTS: ICD-10-CM

## 2019-10-23 DIAGNOSIS — I67.1 ANEURYSM OF MIDDLE CEREBRAL ARTERY: ICD-10-CM

## 2019-10-23 DIAGNOSIS — Z86.69 HX OF GUILLAIN-BARRE SYNDROME: ICD-10-CM

## 2019-10-23 DIAGNOSIS — Z87.898 HISTORY OF SEIZURES: ICD-10-CM

## 2019-10-23 PROCEDURE — G0008 ADMIN INFLUENZA VIRUS VAC: HCPCS | Performed by: NURSE PRACTITIONER

## 2019-10-23 PROCEDURE — G0439 PPPS, SUBSEQ VISIT: HCPCS | Performed by: NURSE PRACTITIONER

## 2019-10-23 PROCEDURE — 90662 IIV NO PRSV INCREASED AG IM: CPT | Performed by: NURSE PRACTITIONER

## 2019-10-23 RX ORDER — ROSUVASTATIN CALCIUM 5 MG/1
5 TABLET, COATED ORAL EVERY EVENING
Qty: 30 TAB | Refills: 11 | Status: SHIPPED | OUTPATIENT
Start: 2019-10-23 | End: 2019-10-24 | Stop reason: SDUPTHER

## 2019-10-23 ASSESSMENT — PATIENT HEALTH QUESTIONNAIRE - PHQ9
CLINICAL INTERPRETATION OF PHQ2 SCORE: 4
SUM OF ALL RESPONSES TO PHQ QUESTIONS 1-9: 8
5. POOR APPETITE OR OVEREATING: 0 - NOT AT ALL

## 2019-10-23 ASSESSMENT — ENCOUNTER SYMPTOMS: GENERAL WELL-BEING: GOOD

## 2019-10-23 ASSESSMENT — ACTIVITIES OF DAILY LIVING (ADL): BATHING_REQUIRES_ASSISTANCE: 0

## 2019-10-23 NOTE — TELEPHONE ENCOUNTER
----- Message from ALETA Mahajan sent at 10/23/2019  1:39 PM PDT -----  Please obtain last consult note from Dr. Rivero - thank you.

## 2019-10-23 NOTE — PROGRESS NOTES
Annual wellness      HPI:  Alisa is a 69 y.o. here for Medicare Annual Wellness Visit.    Feeling physically well.   Under a lot of stress.    Denies headaches, CP, leg swelling, dizziness or trouble breathing.    Still smokes.    No alcohol.   She thinks that she is due to see her vascular specialist, last visit was in January 2019.  She has a stent in her left carotid artery and her left femoral artery.  She also has a coil to a cerebral artery.    Patient Active Problem List    Diagnosis Date Noted   • Aneurysm of left internal carotid artery 10/22/2018     Priority: High   • Aneurysm of internal carotid artery 05/26/2018     Priority: High   • Aneurysm of middle cerebral artery 05/26/2018     Priority: High   • Femoral artery occlusion, right (HCC) 05/26/2018     Priority: High   • Reactive depression 09/17/2013     Priority: High   • Tobacco abuse 05/25/2018     Priority: Low   • Moderate protein-calorie malnutrition ([facility]) 01/17/2019   • History of left common carotid artery stent placement 01/17/2019   • History of femoral artery thrombosis 01/17/2019   • Irritant contact dermatitis due to other agents 01/16/2018   • Abnormal TSH 01/16/2018   • Essential hypertension 08/25/2016   • PAD (peripheral artery disease) ([facility]) 01/25/2016   • H/O bee sting allergy 08/26/2014   • Dyslipidemia 08/11/2014   • Hx of Guillain-[place] syndrome 09/17/2013   • HTN (hypertension)    • History of seizures    • History of kidney stones        Current Outpatient Medications   Medication Sig Dispense Refill   • PARoxetine (PAXIL) 10 MG Tab Take 10 mg by mouth every day.     • clopidogrel (PLAVIX) 75 MG Tab TAKE 1 TABLET BY MOUTH EVERY DAY 90 Tab 1   • clopidogrel (PLAVIX) 75 MG Tab Take 1 Tab by mouth every day. 90 Tab 1   • therapeutic multivitamin-minerals (THERAGRAN-M) Tab Take 1 Tab by mouth every day.     • Ascorbic Acid (VITAMIN C CR) 1000 MG Tab CR Take 1 Tab by mouth every day.     • doxycycline (VIBRAMYCIN) 100 MG Tab Take  1 Tab by mouth 2 times a day. (Patient not taking: Reported on 4/26/2019) 20 Tab 0   • aspirin EC (ECOTRIN) 81 MG Tablet Delayed Response Take 81 mg by mouth every day.     • escitalopram (LEXAPRO) 10 MG Tab Take 1 Tab by mouth every day. (Patient not taking: Reported on 4/26/2019) 30 Tab 5   • naphazoline-pheniramine (OPCON-A) 0.027-0.315 % Solution Place 1 Drop in left eye 1 time daily as needed.     • atorvastatin (LIPITOR) 40 MG Tab TAKE 1 TABLET BY MOUTH EVERYDAY AT BEDTIME (Patient not taking: Reported on 10/16/2019) 90 Tab 1     No current facility-administered medications for this visit.         Patient is taking medications as noted in medication list.  Current supplements as per medication list.     Allergies: Bee; Nsaids; Codeine; and Keflex    Current social contact/activities: work / friends / family     Is patient current with immunizations? No, due for FLU. Patient is interested in receiving FLU today.    She  reports that she has been smoking cigarettes. She has a 12.50 pack-year smoking history. She has never used smokeless tobacco. She reports that she drinks about 0.6 oz of alcohol per week. She reports that she does not use drugs.  Ready to quit: Not Answered  Counseling given: Not Answered  Comment: 1/2 ppd down to 4 cig/day since 4/13, down to 2 cig/day since 1014        DPA/Advanced directive: Patient does not have an Advanced Directive.  A packet and workshop information was given on Advanced Directives.    ROS:    Gait: Uses no assistive device   Ostomy: No   Other tubes: No   Amputations: No   Chronic oxygen use No   Last eye exam 2019   Wears hearing aids: No   : Denies any urinary leakage during the last 6 months      Screening:        Depression Screening    Little interest or pleasure in doing things?  3 - nearly every day  Feeling down, depressed, or hopeless? 1 - several days  Trouble falling or staying asleep, or sleeping too much?  0 - not at all  Feeling tired or having little  energy?  3 - nearly every day  Poor appetite or overeating?  0 - not at all  Feeling bad about yourself - or that you are a failure or have let yourself or your family down? 1 - several days  Trouble concentrating on things, such as reading the newspaper or watching television? 0 - not at all  Moving or speaking so slowly that other people could have noticed.  Or the opposite - being so fidgety or restless that you have been moving around a lot more than usual?  0 - not at all  Thoughts that you would be better off dead, or of hurting yourself?  0 - not at all  Patient Health Questionnaire Score: 8      If depressive symptoms identified deferred to follow up visit unless specifically addressed in assessment and plan.    Interpretation of PHQ-9 Total Score   Score Severity   1-4 No Depression   5-9 Mild Depression   10-14 Moderate Depression   15-19 Moderately Severe Depression   20-27 Severe Depression      Screening for Cognitive Impairment    Three Minute Recall (village, kitchen, baby)  3/3    Draw clock face with all 12 numbers and set the hands to show 10 past 10.  No    If cognitive concerns identified, deferred for follow up unless specifically addressed in assessment and plan.    Fall Risk Assessment    Has the patient had two or more falls in the last year or any fall with injury in the last year?  No  If fall risk identified, deferred for follow up unless specifically addressed in assessment and plan.      Safety Assessment    Throw rugs on floor.  No  Handrails on all stairs.  Yes  Good lighting in all hallways.  Yes  Difficulty hearing.  Yes  Patient counseled about all safety risks that were identified.    Functional Assessment ADLs    Are there any barriers preventing you from cooking for yourself or meeting nutritional needs?  No.    Are there any barriers preventing you from driving safely or obtaining transportation?  No.    Are there any barriers preventing you from using a telephone or calling for  help?  No.    Are there any barriers preventing you from shopping?  No.    Are there any barriers preventing you from taking care of your own finances?  No.    Are there any barriers preventing you from managing your medications?    No.    Are there any barriers preventing you from showering, bathing or dressing yourself?  No.    Are you currently engaging in any exercise or physical activity?  No.     What is your perception of your health?  Good.    Health Maintenance Summary                HEPATITIS C SCREENING Overdue 1950     IMM HEP B VACCINE Overdue 6/22/1967     IMM DTaP/Tdap/Td Vaccine Overdue 6/22/1967     IMM ZOSTER VACCINES Overdue 10/1/2014      Done 8/6/2014 Imm Admin: Zoster Vaccine Live (ZVL) (Zostavax)    Annual Wellness Visit Overdue 6/19/2019      Done 6/18/2018 Visit Dx: Medicare annual wellness visit, subsequent     Patient has more history with this topic...    MAMMOGRAM Overdue 7/23/2019      Done 7/23/2018 MA-MAMMO DIAGNOSTIC BILAT W/TOMOSYNTHESIS W/CAD     Patient has more history with this topic...    IMM INFLUENZA Overdue 9/1/2019      Done 1/17/2019 Imm Admin: Influenza Vaccine Adult HD     Patient has more history with this topic...    BONE DENSITY Next Due 7/20/2020      Done 7/20/2015 DS-BONE DENSITY STUDY (DEXA)     Patient has more history with this topic...          Patient Care Team:  ALETA Mahajan as PCP - General (Family Medicine)  Carina Durbin M.D. as Consulting Physician (Surgery)  Janes Rivero M.D. as Medical Home Care Manager (Radiology)  Bret Stiles O.D. as Consulting Physician (Optometry)      Social History     Tobacco Use   • Smoking status: Light Tobacco Smoker     Packs/day: 0.25     Years: 50.00     Pack years: 12.50     Types: Cigarettes   • Smokeless tobacco: Never Used   • Tobacco comment: 1/2 ppd down to 4 cig/day since 4/13, down to 2 cig/day since 1014   Substance Use Topics   • Alcohol use: Yes     Alcohol/week: 0.6 oz      "Types: 1 Glasses of wine per week     Comment: 3/week   • Drug use: No     Family History   Problem Relation Age of Onset   • Other Sister         tremors / instability   • Lung Disease Sister         COPD    • Hyperlipidemia Sister    • Other Paternal Grandfather         brain aneurysm     She  has a past medical history of Contact dermatitis due to cosmetics (9/16/2015), Environmental and seasonal allergies, Guillain Barré syndrome (HCC), Hemorrhagic disorder (HCC), History of kidney stones, History of seizures, Hyperlipidemia, Hypertension, Intracranial aneurysm, PAD (peripheral artery disease) (5/7/2014), Psychiatric problem, Reactive depression (9/17/2013), Renal disorder, and Seizure (HCC) (1988).   Past Surgical History:   Procedure Laterality Date   • THROMBECTOMY Right 5/25/2018    Procedure: FEMORAL ENDARTERECTOMY, ANGIOPLASTY WITH PATCH, FOREIGN BODY REMOVAL;  Surgeon: Carina Dubrin M.D.;  Location: [facility];  Service: General   • RECOVERY  10/4/2013    Performed by [facility] at SURGERY SAME DAY [facility]   • FEMORAL POPLITEAL BYPASS  7/7/2013    Performed by Carina Durbin M.D. [place] [facility]   • RECOVERY  7/6/2013    Performed by [facility] at [facility]   • ABDOMINAL HYSTERECTOMY TOTAL  1981    w/o BSO due to fibroids   • APPENDECTOMY      during hysterectomy   • OTHER      neuroma removed from feet 4-5x   • OTHER      abdominal stent   • TONSILLECTOMY     • WRIST ORIF           Exam:     /64 (BP Location: Left arm, Patient Position: Sitting, BP Cuff Size: Adult)   Pulse 95   Temp 37.4 °C (99.4 °F)   Resp 14   Ht 1.549 m (5' 1\")   Wt 44 kg (97 lb)   SpO2 95%  Body mass index is 18.33 kg/m².    Hearing excellent.    Dentition good  Alert, oriented in no acute distress.  Eye contact is good, speech goal directed, affect calm  CV: RRR. No edema  Lungs : CTA bilateral  Abdomen: soft and nontender.   Rectal: Prostate is " enlarged but soft and nontender    Assessment and Plan. The following treatment and monitoring plan is recommended:    1. Medicare annual wellness visit, subsequent    2. Abnormal TSH  -Resolved.    3. Aneurysm of internal carotid artery  -Last follow-up that I could find for this patient was in January 2019.  I requested records from  and encouraged her to contact his office for next recommended follow-up date.    4. Aneurysm of left internal carotid artery  -As in #3.  Fortunately she is feeling very well without any dizziness, headaches or shortness of breath.    5. Aneurysm of middle cerebral artery  -As in #3 and 4    6. Dyslipidemia  -I strongly encouraged her to take a statin, she was noncompliant with atorvastatin, stating it made her memory feel sluggish.  She will do a trial of rosuvastatin, contacting me if she is having any problems taking this medication.  - rosuvastatin (CRESTOR) 5 MG Tab; Take 1 Tab by mouth every evening. For cholesterol  Dispense: 30 Tab; Refill: 11    7. Essential hypertension  -Stable.  Continue same.    8. H/O bee sting allergy  -Has an EpiPen on hand if needed.  Reminded her of 9 1 precautions.    9. History of femoral artery thrombosis  -Compliant with Plavix.  Denies any leg pain.    10. History of kidney stones  -Has not had a recurrence of kidney stones this year.  Staying well-hydrated.  Avoiding calcium supplements.    11. History of left common carotid artery stent placement    12. History of seizures    13. Hx of Guillain-[place] syndrome    14. Irritant contact dermatitis due to other agents  -Resolved.    15. .Moderate protein-calorie malnutrition (HCC)  -She has gained a bit of weight.  Appetite is good.  Improving.    16. PAD (peripheral artery disease) (HCC)  -Compliant with Plavix.  Restarted statin therapy.  Also on a baby aspirin.  Encouraged follow-up with her vascular specialist.    17. Reactive depression  -Stable.  Continue same.  Doing well on  Paxil.    18. Tobacco abuse  -She was counseled in the strongest way to stop smoking.  She states that she is trying.    19. Need for influenza vaccination  -Discussed her risk with her history of recurrent Guillain-Barré.  She prefers to get the flu shot because of her increased risk of upper respiratory tract infection illness/smoking  She is had a flu shot for several years without any problems.  - Influenza Vaccine, High Dose (65+ Only)      Services suggested: No services needed at this time  Health Care Screening recommendations as per orders if indicated.  Referrals offered: PT/OT/Nutrition counseling/Behavioral Health/Smoking cessation as per orders if indicated.    Discussion today about general wellness and lifestyle habits:    · Prevent falls and reduce trip hazards; Cautioned about securing or removing rugs.  · Have a working fire alarm and carbon monoxide detector;   · Engage in regular physical activity and social activities       Follow-up: No follow-ups on file.

## 2019-10-23 NOTE — LETTER
[facility]  Maria Eugenia ACE Lin A.P.N.  [address] [address]  [place] [address]  Fax: [phone]   Authorization for Release/Disclosure of   Protected Health Information   Name: VIKAS CHRISTINE : 1950 SSN: [id number]   Address: [address] [place] [address] Phone:    [phone] (home)    I authorize the entity listed below to release/disclose the PHI below to:   [facility]/Maria Eugenia Ceballos, A.P.N. and Maria Eugenia Ceballos A.P.N.   Provider or Entity Name:  Janes Rivero M.D.   Address   [facility], [facility], [facility]   Phone:      Fax:  Fax: [phone]   Reason for request: continuity of care   Information to be released:    [  ] LAST COLONOSCOPY,  including any PATH REPORT and follow-up  [  ] LAST FIT/COLOGUARD RESULT [  ] LAST DEXA  [  ] LAST MAMMOGRAM  [  ] LAST PAP  [  ] LAST LABS [  ] RETINA EXAM REPORT  [  ] IMMUNIZATION RECORDS  [ XX ] LAST CONSULT NOTE      [  ] Check here and initial the line next to each item to release ALL health information INCLUDING  _____ Care and treatment for drug and / or alcohol abuse  _____ HIV testing, infection status, or AIDS  _____ Genetic Testing    DATES OF SERVICE OR TIME PERIOD TO BE DISCLOSED: _____________  I understand and acknowledge that:  * This Authorization may be revoked at any time by you in writing, except if your health information has already been used or disclosed.  * Your health information that will be used or disclosed as a result of you signing this authorization could be re-disclosed by the recipient. If this occurs, your re-disclosed health information may no longer be protected by State or Federal laws.  * You may refuse to sign this Authorization. Your refusal will not affect your ability to obtain treatment.  * This Authorization becomes effective upon signing and will  on (date) __________.      If no date is indicated, this Authorization will  one (1) year from the signature date.    Name:  Reina Gandara    Signature:  CONTINUITY OF CARE Date:     10/23/2019       PLEASE FAX REQUESTED RECORDS BACK TO: [phone]

## 2019-10-24 DIAGNOSIS — E78.5 DYSLIPIDEMIA: ICD-10-CM

## 2019-10-24 RX ORDER — ROSUVASTATIN CALCIUM 5 MG/1
5 TABLET, COATED ORAL EVERY EVENING
Qty: 100 TAB | Refills: 3 | Status: SHIPPED | OUTPATIENT
Start: 2019-10-24 | End: 2020-09-01

## 2019-10-24 NOTE — TELEPHONE ENCOUNTER
Was the patient seen in the last year in this department? Yes 10/23/19    Does patient have an active prescription for medications requested? No     Received Request Via: Pharm[facility]

## 2019-12-09 DIAGNOSIS — I73.9 PAD (PERIPHERAL ARTERY DISEASE) (HCC): ICD-10-CM

## 2019-12-09 RX ORDER — CLOPIDOGREL BISULFATE 75 MG/1
TABLET ORAL
Qty: 90 TAB | Refills: 1 | Status: SHIPPED | OUTPATIENT
Start: 2019-12-09 | End: 2020-06-17 | Stop reason: SDUPTHER

## 2019-12-09 NOTE — TELEPHONE ENCOUNTER
Was the patient seen in the last year in this department? Yes  10/23/19  Does patient have an active prescription for medications requested? No     Received Request Via: Pharmacy

## 2020-01-28 ENCOUNTER — APPOINTMENT (OUTPATIENT)
Dept: ADMISSIONS | Facility: MEDICAL CENTER | Age: 70
End: 2020-01-28
Payer: MEDICARE

## 2020-02-12 DIAGNOSIS — Z01.812 PRE-OPERATIVE LABORATORY EXAMINATION: ICD-10-CM

## 2020-02-12 LAB
CREAT SERPL-MCNC: 0.62 MG/DL (ref 0.5–1.4)
INR PPP: 0.95 (ref 0.87–1.13)
PLATELET # BLD AUTO: 318 K/UL (ref 164–446)
PROTHROMBIN TIME: 12.8 SEC (ref 12–14.6)

## 2020-02-12 PROCEDURE — 82565 ASSAY OF CREATININE: CPT

## 2020-02-12 PROCEDURE — 85610 PROTHROMBIN TIME: CPT

## 2020-02-12 PROCEDURE — 85049 AUTOMATED PLATELET COUNT: CPT

## 2020-02-12 PROCEDURE — 36415 COLL VENOUS BLD VENIPUNCTURE: CPT

## 2020-02-14 ENCOUNTER — APPOINTMENT (OUTPATIENT)
Dept: RADIOLOGY | Facility: MEDICAL CENTER | Age: 70
End: 2020-02-14
Attending: RADIOLOGY
Payer: MEDICARE

## 2020-02-14 ENCOUNTER — HOSPITAL ENCOUNTER (OUTPATIENT)
Facility: MEDICAL CENTER | Age: 70
End: 2020-02-14
Attending: RADIOLOGY | Admitting: RADIOLOGY
Payer: MEDICARE

## 2020-02-14 VITALS
HEART RATE: 74 BPM | SYSTOLIC BLOOD PRESSURE: 138 MMHG | TEMPERATURE: 98 F | DIASTOLIC BLOOD PRESSURE: 72 MMHG | RESPIRATION RATE: 18 BRPM | OXYGEN SATURATION: 94 % | BODY MASS INDEX: 18.9 KG/M2 | WEIGHT: 100.09 LBS | HEIGHT: 61 IN

## 2020-02-14 DIAGNOSIS — I67.1 INTRACRANIAL ANEURYSM: ICD-10-CM

## 2020-02-14 PROCEDURE — 99153 MOD SED SAME PHYS/QHP EA: CPT

## 2020-02-14 PROCEDURE — 36226 PLACE CATH VERTEBRAL ART: CPT

## 2020-02-14 PROCEDURE — 700117 HCHG RX CONTRAST REV CODE 255: Performed by: RADIOLOGY

## 2020-02-14 PROCEDURE — 36224 PLACE CATH CAROTD ART: CPT

## 2020-02-14 PROCEDURE — 160002 HCHG RECOVERY MINUTES (STAT)

## 2020-02-14 PROCEDURE — 700111 HCHG RX REV CODE 636 W/ 250 OVERRIDE (IP)

## 2020-02-14 PROCEDURE — 700111 HCHG RX REV CODE 636 W/ 250 OVERRIDE (IP): Performed by: RADIOLOGY

## 2020-02-14 RX ORDER — OXYCODONE HYDROCHLORIDE 5 MG/1
2.5 TABLET ORAL
Status: DISCONTINUED | OUTPATIENT
Start: 2020-02-14 | End: 2020-02-14 | Stop reason: HOSPADM

## 2020-02-14 RX ORDER — ONDANSETRON 2 MG/ML
4 INJECTION INTRAMUSCULAR; INTRAVENOUS EVERY 8 HOURS PRN
Status: DISCONTINUED | OUTPATIENT
Start: 2020-02-14 | End: 2020-02-14 | Stop reason: HOSPADM

## 2020-02-14 RX ORDER — SODIUM CHLORIDE 9 MG/ML
INJECTION, SOLUTION INTRAVENOUS CONTINUOUS
Status: DISCONTINUED | OUTPATIENT
Start: 2020-02-14 | End: 2020-02-14 | Stop reason: HOSPADM

## 2020-02-14 RX ORDER — HYDROMORPHONE HYDROCHLORIDE 2 MG/ML
0.25 INJECTION, SOLUTION INTRAMUSCULAR; INTRAVENOUS; SUBCUTANEOUS
Status: DISCONTINUED | OUTPATIENT
Start: 2020-02-14 | End: 2020-02-14 | Stop reason: HOSPADM

## 2020-02-14 RX ORDER — SODIUM CHLORIDE 9 MG/ML
500 INJECTION, SOLUTION INTRAVENOUS
Status: ACTIVE | OUTPATIENT
Start: 2020-02-14 | End: 2020-02-14

## 2020-02-14 RX ORDER — MIDAZOLAM HYDROCHLORIDE 1 MG/ML
.5-2 INJECTION INTRAMUSCULAR; INTRAVENOUS PRN
Status: ACTIVE | OUTPATIENT
Start: 2020-02-14 | End: 2020-02-14

## 2020-02-14 RX ORDER — OXYCODONE HYDROCHLORIDE 5 MG/1
5 TABLET ORAL
Status: DISCONTINUED | OUTPATIENT
Start: 2020-02-14 | End: 2020-02-14 | Stop reason: HOSPADM

## 2020-02-14 RX ORDER — MIDAZOLAM HYDROCHLORIDE 1 MG/ML
INJECTION INTRAMUSCULAR; INTRAVENOUS
Status: COMPLETED
Start: 2020-02-14 | End: 2020-02-14

## 2020-02-14 RX ORDER — ONDANSETRON 2 MG/ML
4 INJECTION INTRAMUSCULAR; INTRAVENOUS PRN
Status: ACTIVE | OUTPATIENT
Start: 2020-02-14 | End: 2020-02-14

## 2020-02-14 RX ADMIN — FENTANYL CITRATE 25 MCG: 0.05 INJECTION, SOLUTION INTRAMUSCULAR; INTRAVENOUS at 09:24

## 2020-02-14 RX ADMIN — FENTANYL CITRATE 25 MCG: 50 INJECTION, SOLUTION INTRAMUSCULAR; INTRAVENOUS at 09:24

## 2020-02-14 RX ADMIN — IOHEXOL 150 ML: 300 INJECTION, SOLUTION INTRAVENOUS at 10:00

## 2020-02-14 RX ADMIN — MIDAZOLAM HYDROCHLORIDE 1 MG: 1 INJECTION, SOLUTION INTRAMUSCULAR; INTRAVENOUS at 09:48

## 2020-02-14 RX ADMIN — MIDAZOLAM HYDROCHLORIDE 1 MG: 1 INJECTION, SOLUTION INTRAMUSCULAR; INTRAVENOUS at 09:24

## 2020-02-14 RX ADMIN — FENTANYL CITRATE 25 MCG: 50 INJECTION, SOLUTION INTRAMUSCULAR; INTRAVENOUS at 09:48

## 2020-02-14 NOTE — H&P
History and Physical    Date: 2/14/2020    PCP: ALETA Mahajan          HPI: This is a 69 y.o. female who is presenting with repaired aneurysms    Past Medical History:   Diagnosis Date   • Acute nasopharyngitis     cold 2/1/20   • Blood clotting disorder (HCC) 2016/2017    DVT   • Contact dermatitis due to cosmetics 9/16/2015   • Environmental and seasonal allergies    • Guillain Barré syndrome (HCC)     Hx    • Hemorrhagic disorder (HCC)     plavix   • History of kidney stones     x2 episodes   • History of seizures     x2 episodes - no treatment needed   • Hyperlipidemia    • Hypertension    • Intracranial aneurysm    • PAD (peripheral artery disease) 5/7/2014   • Psychiatric problem     depression   • Reactive depression 9/17/2013   • Renal disorder     stones   • Seizure (HCC) 1988    unknown cause       Past Surgical History:   Procedure Laterality Date   • THROMBECTOMY Right 5/25/2018    Procedure: FEMORAL ENDARTERECTOMY, ANGIOPLASTY WITH PATCH, FOREIGN BODY REMOVAL;  Surgeon: Carina Durbin M.D.;  Location: SURGERY [facility];  Service: General   • RECOVERY  10/4/2013    Performed by [facility] at SURGERY SAME DAY [facility]   • FEMORAL POPLITEAL BYPASS  7/7/2013    Performed by Carina Durbin M.D. at SURGERY [facility]   • RECOVERY  7/6/2013    Performed by [facility] at [facility]   • ABDOMINAL HYSTERECTOMY TOTAL  1981    w/o BSO due to fibroids   • APPENDECTOMY      during hysterectomy   • OTHER      neuroma removed from feet 4-5x   • OTHER      abdominal stent   • TONSILLECTOMY     • WRIST ORIF         Current Facility-Administered Medications   Medication Dose Route Frequency Provider Last Rate Last Dose   • NS infusion   Intravenous Continuous Janes Rivero M.D.            Social History     Socioeconomic History   • Marital status:      Spouse name: Not on file   • Number of children: Not on file   • Years of education: Not on  file   • Highest education level: Not on file   Occupational History   • Occupation:      Employer: [employer]   Social Needs   • Financial resource strain: Not on file   • Food insecurity     Worry: Not on file     Inability: Not on file   • Transportation needs     Medical: Not on file     Non-medical: Not on file   Tobacco Use   • Smoking status: Light Tobacco Smoker     Packs/day: 0.25     Years: 50.00     Pack years: 12.50     Types: Cigarettes   • Smokeless tobacco: Never Used   • Tobacco comment: 1/2 ppd down to 4 cig/day since 4/13, down to 2 cig/day since 1014   Substance and Sexual Activity   • Alcohol use: Not Currently     Alcohol/week: 0.6 oz     Types: 1 Glasses of wine per week   • Drug use: No   • Sexual activity: Not Currently     Partners: Male     Comment: one son; ;  wk: insurance   Lifestyle   • Physical activity     Days per week: Not on file     Minutes per session: Not on file   • Stress: Not on file   Relationships   • Social connections     Talks on phone: Not on file     Gets together: Not on file     Attends Yazidi service: Not on file     Active member of club or organization: Not on file     Attends meetings of clubs or organizations: Not on file     Relationship status: Not on file   • Intimate partner violence     Fear of current or ex partner: Not on file     Emotionally abused: Not on file     Physically abused: Not on file     Forced sexual activity: Not on file   Other Topics Concern   • Not on file   Social History Narrative   • Not on file       Family History   Problem Relation Age of Onset   • Other Sister         tremors / instability   • Lung Disease Sister         COPD    • Hyperlipidemia Sister    • Other Paternal Grandfather         brain aneurysm       Allergies:  Bee; Nsaids; Codeine; and Keflex    Review of Systems:  Cerebral aneurysms    Physical Exam   Constitutional: She is oriented to person, place, and time. She appears  well-developed and well-nourished. No distress.   Eyes: No scleral icterus.   Pulmonary/Chest: Effort normal. No respiratory distress.   Abdominal: Soft. She exhibits no distension.   Neurological: She is alert and oriented to person, place, and time. No cranial nerve deficit. Coordination normal.   Skin: Skin is warm and dry. No rash noted. She is not diaphoretic. No erythema. No pallor.   Psychiatric: She has a normal mood and affect. Her behavior is normal. Judgment and thought content normal.       Vital Signs  Blood Pressure : 111/61   Temperature: 36.9 °C (98.4 °F)   Pulse: 73   Respiration: 16   Pulse Oximetry: 96 %        Labs:  Recent Labs     02/12/20  1312   PLATELETCT 318     Recent Labs     02/12/20  1312   CREATININE 0.62     Recent Labs     02/12/20  1312   INR 0.95     Recent Labs     02/12/20  1312   INR 0.95       Radiology:  IR-CAROTID-CEREBRAL BILATERAL    (Results Pending)             Assessment and Plan:This is a 69 y.o. with cerebral aneurysms    Plan:Diagnostic cerebral angiogram

## 2020-02-14 NOTE — OR SURGEON
Immediate Post- Operative Note        PostOp Diagnosis: Aneurysms      Procedure(s):Diagnostic cerebral angiogram      Estimated Blood Loss: Less than 5 ml        Complications: None            2/14/2020     10:10 AM     Janes Rivero M.D.

## 2020-02-14 NOTE — OR NURSING
1020 Pt over from IR post cerebral angiogram. Left groin site CDI and soft, no signs of bleeding. Pt awake and alert, denies pain or nausea. VSS.  1030 Tolerating orals  1050 Family to bedside  1155 Dr. Rivero at bedside  1200 Bedrest complete pt sat up, left groin site CDI and soft, no signs of bleeding  1205 Criteria met, pt ambulated to restroom, tolerated well, L groin site CDI and soft no signs of bleeding  1207 Discharge instructions provided to pt and spouse. Discussed diet, activity, follow up, medications and symptom management. Pt and spouse state understanding. Pt and spouse state all questions have been answered. Copy of discharge provided to pt.   1220 pt wheeled off unit by volunteer with all belongings without incident.

## 2020-02-14 NOTE — PROGRESS NOTES
IR RN note:     Cerebral angiogram by MD Rivero       Patient tolerated procedure, pt awake and talking post procedure     Report given to RN Blanca, patient transported back to PPU via IR RN monitored then transferred care    Angio-Seal 6fr deployed @ 1001 into left femoral artery REF: [id number] LOT: [id number]

## 2020-02-14 NOTE — DISCHARGE INSTRUCTIONS
ACTIVITY: Rest and take it easy for the first 24 hours.  A responsible adult is recommended to remain with you during that time.  It is normal to feel sleepy.  We encourage you to not do anything that requires balance, judgment or coordination.    MILD FLU-LIKE SYMPTOMS ARE NORMAL. YOU MAY EXPERIENCE GENERALIZED MUSCLE ACHES, THROAT IRRITATION, HEADACHE AND/OR SOME NAUSEA.  [id number]  Discharge Instructions for Cerebral Angiography   You had a procedure called cerebral angiography. This is an X-ray study of the blood vessels that supply your brain. During the procedure, the healthcare provider put a thin, flexible tube (catheter) into a blood vessel in your groin, arm, or neck through a small cut (incision). The provider injected contrast dye into your bloodstream to help take clear X-ray images. Here’s what to do at home afterward.     Possible complications to watch for  The most common complication of this test is a blood clot (hematoma) where the catheter was inserted. This is usually in the groin.  A blood clot appears as a lump the size of a tennis ball under the skin.  The medical staff usually notices this before you leave the imaging facility.  A blood clot is treated by putting pressure on the site for a few hours to prevent it from getting bigger.  You will be told to put cold packs on your groin for 24 hours to ease the pain.  It takes a couple of weeks for the blood clot to heal.   A less common complication is transient ischemic attacks (TIA) or a stroke.  A TIA or stroke is caused by less blood flow to your brain. You might have weakness of an arm or leg, have difficulty speaking and understanding words, or lose some vision or not remember things well.  A TIA can last just a few hours. A stroke can last for days or weeks, or be permanent.  The older you are, the greater the risk for a TIA or a stroke after a cerebral angiography. You might notice these symptoms at the time of the test or after you have  "left the imaging facility, sometimes days later.     What to do at home   1. Examine (look and feel) the site of your incision site TODAY so you can recognize changes that should be called to your doctor (see below).  2. Avoid straining either by lifting or pulling objects for 5 days. Avoid lifting over 10 pounds.   3. For at least 72 hours, if you should sneeze or cough, please hold pressure over your groin area.  4. If you should begin to have oozing from the catheterization site, please hold firm pressure and call your doctor's office immediately.  5. If profuse bleeding occurs from the catheterization site, hold firm pressure and call \"911\" immediately for assistance.  · Rest at home in bed for 12 hours, or as long as directed.  · Go back to your normal diet and take your regular medicines.  · Do only light and easy activities for 2 to 3 days.  · Drink 6 to 8 glasses of water a day. This will keep you from losing fluid. It will also help flush the X-ray dye out of your body.  · Don't drive until the day after your procedure.  · Don’t do strenuous activity for 2 weeks. Don’t lift anything heavier than 10 pounds for 5 days.  · You can shower the day after your procedure. But don't swim or sit in a bath or hot tub until your incision site has healed.  · Take your temperature and check your incision site for signs of infection every day for 1 week. Check for redness, swelling, or warmth at the site.  · Ask your healthcare provider when you can go back to work.     Call 911  A stroke is a medical emergency. Call 911 right away if you have any of the following symptoms of a stroke or TIA:  · Weakness, tingling, or loss of feeling on one side of your face or body  · Sudden double vision or trouble seeing in one or both eyes  · Sudden trouble talking or slurred speech  · Sudden, severe headache  F.A.S.T. is an easy way to remember the signs of stroke. When you see these signs, you know that you need to call 911 " fast.  F.A.S.T. stands for:  · F is for face drooping. One side of the face is drooping or numb. When the person smiles, the smile is uneven.  · A is for arm weakness. One arm is weak or numb. When the person lifts both arms at the same time, one arm may drift downward.  · S is for speech difficulty. You may notice slurred speech or trouble speaking. The person can't repeat a simple sentence correctly when asked.  · T is for time to call 911. If someone shows any of these symptoms, even if they go away, call 911 right away. Make note of the time the symptoms first appeared.  When to call your healthcare provider  Call your healthcare provider right away if you have any of the following:  · Trouble breathing  · Dizziness  · Constant or increasing pain or numbness in your leg, arm, or neck  · Fever of 100.4°F (38°C) or higher, or as directed by your healthcare provider  · Signs of infection at the incision site such as redness, swelling, or warmth  · Shortness of breath  · Leg feels cold or looks blue  · Bleeding, bruising, or a large swelling where the catheter was inserted  · Not enough urine or no urine         FOR 24 HOURS DO NOT:  Drive, operate machinery or run household appliances.  Drink beer or alcoholic beverages.   Make important decisions or sign legal documents.    DIET: To avoid nausea, slowly advance diet as tolerated, avoiding spicy or greasy foods for the first day.  Add more substantial food to your diet according to your physician's instructions.  INCREASE FLUIDS AND FIBER TO AVOID CONSTIPATION.    SURGICAL DRESSING/BATHING: Keep dressing dry for 24 hours. May remove dressing and shower after 1pm on 2/15, do not need to replace dressing. Do not submerge in water or bath for 7 days.     FOLLOW-UP APPOINTMENT:  A follow-up appointment should be arranged with your doctor in 1 week; call to schedule.    You should CALL YOUR PHYSICIAN if you develop:  Fever greater than 101 degrees F.  Pain not relieved  by medication, or persistent nausea or vomiting.  Excessive bleeding (blood soaking through dressing) or unexpected drainage from the wound.  Extreme redness or swelling around the incision site, drainage of pus or foul smelling drainage.  Inability to urinate or empty your bladder within 8 hours.  Problems with breathing or chest pain.    You should call 911 if you develop problems with breathing or chest pain.  If you are unable to contact your doctor or surgical center, you should go to the nearest emergency room or urgent care center.  Physician's telephone #: Vkyygzlry [phone]    If any questions arise, call your doctor.  If your doctor is not available, please feel free to call the Surgical Center at [phone].  The Center is open Monday through Friday from 7AM to 7PM.  You can also call the [facility] HOTLINE open 24 hours/day, 7 days/week and speak to a nurse at [phone], or toll free at [phone].    A registered nurse may call you a few days after your surgery to see how you are doing after your procedure.    MEDICATIONS: Resume taking daily medication.  Take prescribed pain medication with food.  If no medication is prescribed, you may take non-aspirin pain medication if needed.  PAIN MEDICATION CAN BE VERY CONSTIPATING.  Take a stool softener or laxative such as senokot, pericolace, or milk of magnesia if needed.    If your physician has prescribed pain medication that includes Acetaminophen (Tylenol), do not take additional Acetaminophen (Tylenol) while taking the prescribed medication.    Depression / Suicide Risk    As you are discharged from this [facility] facility, it is important to learn how to keep safe from harming yourself.    Recognize the warning signs:  · Abrupt changes in personality, positive or negative- including increase in energy   · Giving away possessions  · Change in eating patterns- significant weight changes-  positive or negative  · Change in sleeping patterns-  unable to sleep or sleeping all the time   · Unwillingness or inability to communicate  · Depression  · Unusual sadness, discouragement and loneliness  · Talk of wanting to die  · Neglect of personal appearance   · Rebelliousness- reckless behavior  · Withdrawal from people/activities they love  · Confusion- inability to concentrate     If you or a loved one observes any of these behaviors or has concerns about self-harm, here's what you can do:  · Talk about it- your feelings and reasons for harming yourself  · Remove any means that you might use to hurt yourself (examples: pills, rope, extension cords, firearm)  · Get professional help from the community (Mental Health, Substance Abuse, psychological counseling)  · Do not be alone:Call your Safe Contact- someone whom you trust who will be there for you.  · Call your local CRISIS HOTLINE 574-7442 or 116-744-6389  · Call your local Children's Mobile Crisis Response Team [place] (651) 995-8270 or www.[employer]  · Call the toll free National Suicide Prevention Hotlines   · National Suicide Prevention Lifeline 771-991-WOCP (6775)  · National Hope Line Network 800-SUICIDE (778-9363)

## 2020-05-28 ENCOUNTER — PATIENT OUTREACH (OUTPATIENT)
Dept: HEALTH INFORMATION MANAGEMENT | Facility: OTHER | Age: 70
End: 2020-05-28

## 2020-05-28 NOTE — PROGRESS NOTES
Calling member to introduce myself as their [facility] . I explained what I am here to assist with. The member has no other questions at this time.  If they have further questions comments or concerns please transfer to [phone]  She is also aware of VV and will call me if she needs to schedule one.

## 2020-06-17 DIAGNOSIS — I73.9 PAD (PERIPHERAL ARTERY DISEASE) (HCC): ICD-10-CM

## 2020-06-17 RX ORDER — CLOPIDOGREL BISULFATE 75 MG/1
75 TABLET ORAL
Qty: 90 TAB | Refills: 1 | Status: SHIPPED | OUTPATIENT
Start: 2020-06-17 | End: 2020-12-24

## 2020-06-17 NOTE — TELEPHONE ENCOUNTER
Received request via: Pharmacy    Was the patient seen in the last year in this department? Yes10/23/19    Does the patient have an active prescription (recently filled or refills available) for medication(s) requested? No

## 2020-07-30 ENCOUNTER — TELEPHONE (OUTPATIENT)
Dept: MEDICAL GROUP | Facility: LAB | Age: 70
End: 2020-07-30

## 2020-07-30 DIAGNOSIS — Z78.0 POSTMENOPAUSAL: ICD-10-CM

## 2020-07-30 DIAGNOSIS — Z12.31 ENCOUNTER FOR SCREENING MAMMOGRAM FOR BREAST CANCER: ICD-10-CM

## 2020-07-30 DIAGNOSIS — Z12.39 SCREENING FOR BREAST CANCER: ICD-10-CM

## 2020-07-30 NOTE — TELEPHONE ENCOUNTER
Called patient regarding health maintenance. She states she would be interested in a mammogram if it were advised for her by Maria Eugenia, however, in the past all of her results came back as her having dense breasts so she is unsure if there is a screening method that would be more effective for her so they are able to see more. I pended the bone density for her with her approval and provided her with the [facility] Scheduling phone number. Please advise.

## 2020-07-31 NOTE — TELEPHONE ENCOUNTER
Mammogram and DEXA ordered.   Will start with screening mammogram and consider sonocine depending on the radiologists reading. Please let patient know. Thanks

## 2020-09-01 ENCOUNTER — HOSPITAL ENCOUNTER (EMERGENCY)
Facility: MEDICAL CENTER | Age: 70
End: 2020-09-01
Attending: EMERGENCY MEDICINE
Payer: MEDICARE

## 2020-09-01 ENCOUNTER — APPOINTMENT (OUTPATIENT)
Dept: RADIOLOGY | Facility: MEDICAL CENTER | Age: 70
End: 2020-09-01
Attending: EMERGENCY MEDICINE
Payer: MEDICARE

## 2020-09-01 VITALS
RESPIRATION RATE: 16 BRPM | HEART RATE: 94 BPM | TEMPERATURE: 96.8 F | DIASTOLIC BLOOD PRESSURE: 71 MMHG | HEIGHT: 61 IN | SYSTOLIC BLOOD PRESSURE: 126 MMHG | BODY MASS INDEX: 18.9 KG/M2 | OXYGEN SATURATION: 97 % | WEIGHT: 100.09 LBS

## 2020-09-01 DIAGNOSIS — S90.31XA CONTUSION OF RIGHT FOOT, INITIAL ENCOUNTER: ICD-10-CM

## 2020-09-01 PROCEDURE — A9270 NON-COVERED ITEM OR SERVICE: HCPCS | Performed by: EMERGENCY MEDICINE

## 2020-09-01 PROCEDURE — 73630 X-RAY EXAM OF FOOT: CPT | Mod: RT

## 2020-09-01 PROCEDURE — 99283 EMERGENCY DEPT VISIT LOW MDM: CPT

## 2020-09-01 PROCEDURE — 700102 HCHG RX REV CODE 250 W/ 637 OVERRIDE(OP): Performed by: EMERGENCY MEDICINE

## 2020-09-01 RX ORDER — HYDROCODONE BITARTRATE AND ACETAMINOPHEN 5; 325 MG/1; MG/1
1 TABLET ORAL ONCE
Status: COMPLETED | OUTPATIENT
Start: 2020-09-01 | End: 2020-09-01

## 2020-09-01 RX ORDER — HYDROCODONE BITARTRATE AND ACETAMINOPHEN 5; 325 MG/1; MG/1
1 TABLET ORAL EVERY 4 HOURS PRN
Qty: 10 TAB | Refills: 0 | Status: SHIPPED | OUTPATIENT
Start: 2020-09-01 | End: 2020-09-04

## 2020-09-01 RX ADMIN — HYDROCODONE BITARTRATE AND ACETAMINOPHEN 1 TABLET: 5; 325 TABLET ORAL at 22:12

## 2020-09-01 ASSESSMENT — FIBROSIS 4 INDEX: FIB4 SCORE: 1.16

## 2020-09-02 NOTE — ED PROVIDER NOTES
ED Provider Note    CHIEF COMPLAINT  Chief Complaint   Patient presents with   • Foot Pain     Pt reports right foot pain, rated 10/10. Pt dropped a truck tailgate on the top of her right foot a couple of hours ago. Pt can move toes, CMS intact. Obvious bruising to top of foot.         HPI    Primary care provider: ALETA Mahajan   History obtained from: Patient and   History limited by: None     Reina Gandara is a 70 y.o. female who presents to the ED with  complaining of right foot injury around 4:00 this afternoon when a truck tailgate accidentally fell onto her right foot.  She was wearing shoes but reports that they were not very thick or protective.  She states that the pain was not too bad initially but has worsened and now she is unable to move or bear weight due to the pain.  She denies any weakness or sensory change.  She denies pain anywhere else or any other injuries.  She has not taken any pain medicine and declines pain medicine at this time.    REVIEW OF SYSTEMS  Please see HPI for pertinent positives/negatives.     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[facility])     Hx    • Hemorrhagic disorder ([facility])     plavix   • History of kidney stones     x2 episodes   • History of seizures     x2 episodes - no treatment needed   • Hyperlipidemia    • Hypertension    • Intracranial aneurysm    • Psychiatric problem     depression   • Renal disorder     stones        SURGICAL HISTORY  Past Surgical History:   Procedure Laterality Date   • THROMBECTOMY Right 5/25/2018    Procedure: FEMORAL ENDARTERECTOMY, ANGIOPLASTY WITH PATCH,  FOREIGN BODY REMOVAL;  Surgeon: Carina Durbin M.D.;  Location: SURGERY [facility];  Service: General   • RECOVERY  10/4/2013    Performed by [facility] at SURGERY SAME DAY [facility]   • FEMORAL POPLITEAL BYPASS  7/7/2013    Performed by Carina Durbin M.D. at SURGERY [facility]   • RECOVERY  7/6/2013    Performed by [facility] at SURGERY [facility]   • ABDOMINAL HYSTERECTOMY TOTAL  1981    w/o BSO due to fibroids   • APPENDECTOMY      during hysterectomy   • OTHER      neuroma removed from feet 4-5x   • OTHER      abdominal stent   • TONSILLECTOMY     • WRIST ORIF          SOCIAL HISTORY  Social History     Tobacco Use   • Smoking status: Light Tobacco Smoker     Packs/day: 0.25     Years: 50.00     Pack years: 12.50     Types: Cigarettes   • Smokeless tobacco: Never Used   • Tobacco comment: 1/2 ppd down to 4 cig/day since 4/13, down to 2 cig/day since 1014   Substance and Sexual Activity   • Alcohol use: Not Currently     Alcohol/week: 0.6 oz     Types: 1 Glasses of wine per week   • Drug use: No   • Sexual activity: Not Currently     Partners: Male     Comment: one son; ;  wk: insurance        FAMILY HISTORY  Family History   Problem Relation Age of Onset   • Other Sister         tremors / instability   • Lung Disease Sister         COPD    • Hyperlipidemia Sister    • Other Paternal Grandfather         brain aneurysm        CURRENT MEDICATIONS  Home Medications     Reviewed by Brandy Juarez R.N. (Registered Nurse) on 09/01/20 at 1940  Med List Status: Not Addressed   Medication Last Dose Status   Ascorbic Acid (VITAMIN C CR) 1000 MG Tab CR  Active   clopidogrel (PLAVIX) 75 MG Tab  Active   PARoxetine (PAXIL) 10 MG Tab  Active   therapeutic multivitamin-minerals (THERAGRAN-M) Tab  Active                 ALLERGIES  Allergies   Allergen Reactions   • Bee Anaphylaxis   • Nsaids Swelling     ADVIL  2 trips to ER w reactions of rash swelling with difficulty  "breathing.   • Codeine Nausea     dizzy   • Keflex Vomiting     No trouble with amoxil        PHYSICAL EXAM  VITAL SIGNS: /71   Pulse 94   Temp 36 °C (96.8 °F) (Temporal)   Resp 16   Ht 1.549 m (5' 1\")   Wt 45.4 kg (100 lb 1.4 oz)   LMP  (LMP Unknown)   SpO2 97%   BMI 18.91 kg/m²  @NAYE[[id number]::@     Pulse ox interpretation: 97% I interpret this pulse ox as normal     Constitutional: Well developed, well nourished, alert in no apparent distress, nontoxic appearance   HENT: No external signs of trauma, normocephalic   Eyes: No discharge, no icterus   Neck: No stridor   Cardiovascular: Strong distal pulses and good perfusion   Thorax & Lungs: No respiratory distress   Extremities: No cyanosis, mild edema to the right midfoot with diffuse tenderness to palpation, nontender to palpation proximally or distally, no gross deformity, patient able to wiggle her toes, sensation intact to touch throughout, intact distal pulses with brisk cap refill   Skin: Warm, dry, no pallor/cyanosis, no rash noted   Neuro: A/O times 3, no focal deficits noted   Psychiatric: Cooperative, normal mood and affect, normal judgement, appropriate for clinical situation        DIAGNOSTIC STUDIES / PROCEDURES        LABS  All labs reviewed by me.     Results for orders placed or performed in visit on 02/12/20   PT   Result Value Ref Range    PT 12.8 12.0 - 14.6 sec    INR 0.95 0.87 - 1.13   PLATELET COUNT   Result Value Ref Range    Platelet Count 318 164 - 446 K/uL   CREATININE   Result Value Ref Range    Creatinine 0.62 0.50 - 1.40 mg/dL   ESTIMATED GFR   Result Value Ref Range    GFR If African American >60 >60 mL/min/1.73 m 2    GFR If Non African American >60 >60 mL/min/1.73 m 2        RADIOLOGY  The radiologist's interpretation of all radiological studies have been reviewed by me.     DX-FOOT-COMPLETE 3+ RIGHT   Final Result         1.  No acute traumatic bony injury.             COURSE & MEDICAL DECISION MAKING  Nursing notes, " VS, PMSFHx reviewed in chart.       Differential diagnoses considered include but are not limited to: Fx, dislocation, subluxation, contusion, strain, sprain, neurovascular injury       History and physical exam as above.  X-rays of the right foot without evidence for acute bony injury.  I discussed the findings with the patient and .  This is a pleasant well-appearing patient in no acute distress and nontoxic in appearance with likely right foot contusion.  Low clinical suspicion at this time for compartment syndrome.  I discussed with them worrisome signs and symptoms and return to ED precautions as well as outpatient follow-up for reevaluation.  Patient declined crutches or splint.  She declined pain medicine in the ED initially but did agree to be given a Norco and will be discharged home with a short course of Norco to use as needed.  I discussed with them ice and elevation to help minimize swelling.  They verbalized understanding and agreed with plan of care with no further questions or concerns.      In prescribing controlled substances to this patient, I certify that I have obtained and reviewed the medical history of Reina Gandara. I have also made a good angela effort to obtain applicable records from other providers who have treated the patient and records did not demonstrate any increased risk of substance abuse that would prevent me from prescribing controlled substances.     I have conducted a physical exam and documented it. I have reviewed patient's prescription history as maintained by the [place] Prescription Monitoring Program.     I have assessed the patient’s risk for abuse, dependency, and addiction using the validated Opioid Risk Tool available at https://www.mdcalc.com/ffbyli-resx-sbzh-ort-narcotic-abuse.     Given the above, I believe the benefits of controlled substance therapy outweigh the risks. The reasons for prescribing controlled substances include non-narcotic, oral  analgesic alternatives have been inadequate for pain control. Accordingly, I have discussed the risk and benefits, treatment plan, and alternative therapies with the patient.       The patient is referred to a primary physician for blood pressure management, diabetic screening, and for all other preventative health concerns.       FINAL IMPRESSION  1. Contusion of right foot, initial encounter Acute          DISPOSITION  Patient will be discharged home in stable condition.       FOLLOW UP  ALETA Mahajan  [address]  [place] [address] [phone]    Call in 1 day  Please follow-up in 7 to 10 days for reevaluation    [facility] [place] [facility], Emergency Dept  [address] [address] [phone]    If symptoms worsen         OUTPATIENT MEDICATIONS  Discharge Medication List as of 9/1/2020 10:13 PM      START taking these medications    Details   HYDROcodone-acetaminophen (NORCO) 5-325 MG Tab per tablet Take 1 Tab by mouth every four hours as needed for up to 3 days., Disp-10 Tab,R-0, Print Rx Paper                Electronically signed by: Price Lugo D.O., 9/1/2020 9:13 PM      Portions of this record were made with voice recognition software.  Despite my review, spelling/grammar/context errors may still remain.  Interpretation of this chart should be taken in this context.

## 2020-09-02 NOTE — ED NOTES
Patient educated on discharge instructions, prescriptions, and home care. Patient verbalized understanding. Patient refused discharge vitals. Patient wheeled to ER lobby.

## 2020-09-02 NOTE — ED TRIAGE NOTES
Chief Complaint   Patient presents with   • Foot Pain     Pt reports right foot pain, rated 10/10. Pt dropped a truck tailgate on the top of her right foot a couple of hours ago. Pt can move toes, CMS intact. Obvious bruising to top of foot.      Vitals:    09/01/20 1925   BP: 126/71   Pulse: 94   Resp: 16   Temp: 36 °C (96.8 °F)   SpO2: 97%       Pt amb to triage with steady gait for above complaint.   Pt is alert and oriented, speaking in full sentences, follows commands and responds appropriately to questions. NAD. Resp are even and unlabored.  Pt placed in lobby. Pt educated on triage process. Pt encouraged to alert staff for any changes.

## 2020-09-09 ENCOUNTER — PATIENT OUTREACH (OUTPATIENT)
Dept: HEALTH INFORMATION MANAGEMENT | Facility: OTHER | Age: 70
End: 2020-09-09

## 2020-09-09 NOTE — PROGRESS NOTES
ED follow up. Asked if she would like to see PCP. She said she is doing fine and wishes she didn't go to ED because her foot is fine. I asked her if she would like to schedule the shasha and bone density exam. She declined my offer at this time. She feels that doing a shasha is not helpful since she has dense breast tissue. They always tell her that the results are inconclusive.

## 2020-10-02 ENCOUNTER — TELEPHONE (OUTPATIENT)
Dept: HEALTH INFORMATION MANAGEMENT | Facility: OTHER | Age: 70
End: 2020-10-02

## 2020-10-02 ENCOUNTER — TELEPHONE (OUTPATIENT)
Dept: MEDICAL GROUP | Facility: LAB | Age: 70
End: 2020-10-02

## 2020-10-02 NOTE — TELEPHONE ENCOUNTER
I sent an e-mail with the release form they are requiring to be filled out. Please let me know if you have any questions.

## 2020-10-02 NOTE — TELEPHONE ENCOUNTER
1. Caller Name: Claudia         [facility]       Call Back Number: [phone]      How would the patient prefer to be contacted with a response:     Pt is on blood thinners, she is having oral surgery tomorrow. Does she need to stop, if so how long.

## 2020-10-10 DIAGNOSIS — I10 ESSENTIAL HYPERTENSION: ICD-10-CM

## 2020-10-10 DIAGNOSIS — E03.8 SUBCLINICAL HYPOTHYROIDISM: ICD-10-CM

## 2020-10-10 DIAGNOSIS — E78.5 DYSLIPIDEMIA: ICD-10-CM

## 2020-10-10 DIAGNOSIS — R79.89 ABNORMAL TSH: ICD-10-CM

## 2020-10-26 ENCOUNTER — TELEPHONE (OUTPATIENT)
Dept: MEDICAL GROUP | Facility: LAB | Age: 70
End: 2020-10-26

## 2020-10-26 NOTE — TELEPHONE ENCOUNTER
ANNUAL WELLNESS VISIT PRE-VISIT PLANNING WITHOUT OUTREACH    1.  Reviewed note from last office visit with PCP: YES    2.  If any orders were placed at last visit, do we have Results/Consult Notes?        •  Labs - Labs ordered, NOT completed. Patient advised to complete prior to next appointment.  Note: If patient appointment is for lab review and patient did not complete labs, check with provider if OK to reschedule patient until labs completed.       •  Imaging - Imaging was not ordered at last office visit.       •  Referrals - No referrals were ordered at last office visit.    3.  Immunizations were updated in Epic using WebIZ?: Epic matches WebIZ       •  WebIZ Recommendations: FLU, TDAP and SHINGRIX (Shingles)        •  Is patient due for Tdap? YES. Patient was not notified of copay/out of pocket cost.       •  Is patient due for Shingrix? YES. Patient was not notified of copay/out of pocket cost.     4.  Patient is due for the following Health Maintenance Topics:   Health Maintenance Due   Topic Date Due   • HEPATITIS C SCREENING  1950   • IMM DTaP/Tdap/Td Vaccine (1 - Tdap) 06/22/1967   • IMM ZOSTER VACCINES (2 of 3) 10/01/2014   • BONE DENSITY  07/20/2020   • MAMMOGRAM  07/23/2020   • IMM INFLUENZA (1) 09/01/2020   • Annual Wellness Visit  10/23/2020       - Patient already has appointment scheduled for Annual Wellness Visit (AWV).    5.  Reviewed/Updated the following with patient:       •   Preferred Pharmacy? NO       •   Preferred Lab? NO       •   Preferred Communication? NO       •   Allergies? NO       •   Medications? NO       •   Social History? NO       •   Family History (document living status of immediate family members and if + hx of  cancer, diabetes, hypertension, hyperlipidemia, heart attack, stroke) NO    6.  Care Team Updated:       •   DME Company (gait device, O2, CPAP, etc.): NO       •   Other Specialists (eye doctor, derm, GYN, cardiology, endo, etc): NO    7. Orders for  overdue Health Maintenance topics pended in Pre-Charting? NO    8.  Patient has the following Care Path diagnoses on Problem List:  DEPRESSION         Depression Screen (PHQ-2/PHQ-9) 10/22/2018 10/23/2018 10/23/2019   PHQ-2 Total Score 0 0 -   PHQ-2 Total Score - - -   PHQ-2 Total Score - - 4   PHQ-9 Total Score - - 8       Interpretation of PHQ-9 Total Score   Score Severity   1-4 No Depression   5-9 Mild Depression   10-14 Moderate Depression   15-19 Moderately Severe Depression   20-27 Severe Depression      9.  Patient was not advised: “This is a free wellness visit. The provider will screen for medical conditions to help you stay healthy. If you have other concerns to address you may be asked to discuss these at a separate visit or there may be an additional fee.”     10.  Patient was NOT informed to arrive 15 min prior to their scheduled appointment and bring in their medication bottles.

## 2020-10-28 ENCOUNTER — APPOINTMENT (OUTPATIENT)
Dept: MEDICAL GROUP | Facility: LAB | Age: 70
End: 2020-10-28
Payer: MEDICARE

## 2020-11-16 ENCOUNTER — OFFICE VISIT (OUTPATIENT)
Dept: MEDICAL GROUP | Facility: LAB | Age: 70
End: 2020-11-16
Payer: MEDICARE

## 2020-11-16 ENCOUNTER — HOSPITAL ENCOUNTER (OUTPATIENT)
Dept: LAB | Facility: MEDICAL CENTER | Age: 70
End: 2020-11-16
Attending: NURSE PRACTITIONER
Payer: MEDICARE

## 2020-11-16 VITALS
DIASTOLIC BLOOD PRESSURE: 70 MMHG | WEIGHT: 96 LBS | HEIGHT: 61 IN | OXYGEN SATURATION: 97 % | SYSTOLIC BLOOD PRESSURE: 118 MMHG | BODY MASS INDEX: 18.12 KG/M2 | HEART RATE: 81 BPM | TEMPERATURE: 98.3 F | RESPIRATION RATE: 14 BRPM

## 2020-11-16 DIAGNOSIS — Z87.898 HISTORY OF SEIZURES: ICD-10-CM

## 2020-11-16 DIAGNOSIS — E03.8 SUBCLINICAL HYPOTHYROIDISM: ICD-10-CM

## 2020-11-16 DIAGNOSIS — R79.89 ABNORMAL TSH: ICD-10-CM

## 2020-11-16 DIAGNOSIS — Z86.718 HISTORY OF FEMORAL ARTERY THROMBOSIS: ICD-10-CM

## 2020-11-16 DIAGNOSIS — Z98.890 HISTORY OF LEFT COMMON CAROTID ARTERY STENT PLACEMENT: ICD-10-CM

## 2020-11-16 DIAGNOSIS — I73.9 PAD (PERIPHERAL ARTERY DISEASE) (HCC): ICD-10-CM

## 2020-11-16 DIAGNOSIS — I67.1 ANEURYSM OF MIDDLE CEREBRAL ARTERY: ICD-10-CM

## 2020-11-16 DIAGNOSIS — I67.1 ANEURYSM OF LEFT INTERNAL CAROTID ARTERY: ICD-10-CM

## 2020-11-16 DIAGNOSIS — I10 ESSENTIAL HYPERTENSION: ICD-10-CM

## 2020-11-16 DIAGNOSIS — E78.5 DYSLIPIDEMIA: ICD-10-CM

## 2020-11-16 DIAGNOSIS — E44.0 MODERATE PROTEIN-CALORIE MALNUTRITION (HCC): ICD-10-CM

## 2020-11-16 DIAGNOSIS — F32.9 REACTIVE DEPRESSION: ICD-10-CM

## 2020-11-16 DIAGNOSIS — Z72.0 TOBACCO ABUSE: ICD-10-CM

## 2020-11-16 DIAGNOSIS — Z91.030 H/O BEE STING ALLERGY: ICD-10-CM

## 2020-11-16 DIAGNOSIS — Z86.69 HX OF GUILLAIN-BARRE SYNDROME: ICD-10-CM

## 2020-11-16 DIAGNOSIS — L24.89 IRRITANT CONTACT DERMATITIS DUE TO OTHER AGENTS: ICD-10-CM

## 2020-11-16 DIAGNOSIS — Z00.00 MEDICARE ANNUAL WELLNESS VISIT, SUBSEQUENT: ICD-10-CM

## 2020-11-16 DIAGNOSIS — Z23 NEED FOR VACCINATION: ICD-10-CM

## 2020-11-16 DIAGNOSIS — I67.1 ANEURYSM OF INTERNAL CAROTID ARTERY: ICD-10-CM

## 2020-11-16 DIAGNOSIS — Z95.828 HISTORY OF LEFT COMMON CAROTID ARTERY STENT PLACEMENT: ICD-10-CM

## 2020-11-16 DIAGNOSIS — Z87.442 HISTORY OF KIDNEY STONES: ICD-10-CM

## 2020-11-16 DIAGNOSIS — R41.3 MEMORY DIFFICULTIES: ICD-10-CM

## 2020-11-16 DIAGNOSIS — R92.30 DENSE BREAST TISSUE: ICD-10-CM

## 2020-11-16 PROBLEM — I70.201 FEMORAL ARTERY OCCLUSION, RIGHT (HCC): Status: RESOLVED | Noted: 2018-05-26 | Resolved: 2020-11-16

## 2020-11-16 LAB
ALBUMIN SERPL BCP-MCNC: 4.5 G/DL (ref 3.2–4.9)
ALBUMIN/GLOB SERPL: 2 G/DL
ALP SERPL-CCNC: 103 U/L (ref 30–99)
ALT SERPL-CCNC: 10 U/L (ref 2–50)
ANION GAP SERPL CALC-SCNC: 10 MMOL/L (ref 7–16)
AST SERPL-CCNC: 16 U/L (ref 12–45)
BASOPHILS # BLD AUTO: 0.4 % (ref 0–1.8)
BASOPHILS # BLD: 0.04 K/UL (ref 0–0.12)
BILIRUB SERPL-MCNC: 0.4 MG/DL (ref 0.1–1.5)
BUN SERPL-MCNC: 10 MG/DL (ref 8–22)
CALCIUM SERPL-MCNC: 9.8 MG/DL (ref 8.5–10.5)
CHLORIDE SERPL-SCNC: 106 MMOL/L (ref 96–112)
CHOLEST SERPL-MCNC: 302 MG/DL (ref 100–199)
CO2 SERPL-SCNC: 26 MMOL/L (ref 20–33)
CREAT SERPL-MCNC: 0.52 MG/DL (ref 0.5–1.4)
EOSINOPHIL # BLD AUTO: 0.09 K/UL (ref 0–0.51)
EOSINOPHIL NFR BLD: 0.8 % (ref 0–6.9)
ERYTHROCYTE [DISTWIDTH] IN BLOOD BY AUTOMATED COUNT: 48.1 FL (ref 35.9–50)
FASTING STATUS PATIENT QL REPORTED: NORMAL
GLOBULIN SER CALC-MCNC: 2.3 G/DL (ref 1.9–3.5)
GLUCOSE SERPL-MCNC: 88 MG/DL (ref 65–99)
HCT VFR BLD AUTO: 45.2 % (ref 37–47)
HDLC SERPL-MCNC: 68 MG/DL
HGB BLD-MCNC: 14 G/DL (ref 12–16)
IMM GRANULOCYTES # BLD AUTO: 0.05 K/UL (ref 0–0.11)
IMM GRANULOCYTES NFR BLD AUTO: 0.5 % (ref 0–0.9)
LDLC SERPL CALC-MCNC: 191 MG/DL
LYMPHOCYTES # BLD AUTO: 2.29 K/UL (ref 1–4.8)
LYMPHOCYTES NFR BLD: 21.3 % (ref 22–41)
MCH RBC QN AUTO: 30.5 PG (ref 27–33)
MCHC RBC AUTO-ENTMCNC: 31 G/DL (ref 33.6–35)
MCV RBC AUTO: 98.5 FL (ref 81.4–97.8)
MONOCYTES # BLD AUTO: 0.57 K/UL (ref 0–0.85)
MONOCYTES NFR BLD AUTO: 5.3 % (ref 0–13.4)
NEUTROPHILS # BLD AUTO: 7.73 K/UL (ref 2–7.15)
NEUTROPHILS NFR BLD: 71.7 % (ref 44–72)
NRBC # BLD AUTO: 0 K/UL
NRBC BLD-RTO: 0 /100 WBC
PLATELET # BLD AUTO: 328 K/UL (ref 164–446)
PMV BLD AUTO: 10.3 FL (ref 9–12.9)
POTASSIUM SERPL-SCNC: 4.7 MMOL/L (ref 3.6–5.5)
PROT SERPL-MCNC: 6.8 G/DL (ref 6–8.2)
RBC # BLD AUTO: 4.59 M/UL (ref 4.2–5.4)
SODIUM SERPL-SCNC: 142 MMOL/L (ref 135–145)
T4 FREE SERPL-MCNC: 1.17 NG/DL (ref 0.93–1.7)
TRIGL SERPL-MCNC: 217 MG/DL (ref 0–149)
TSH SERPL DL<=0.005 MIU/L-ACNC: 2.48 UIU/ML (ref 0.38–5.33)
WBC # BLD AUTO: 10.8 K/UL (ref 4.8–10.8)

## 2020-11-16 PROCEDURE — 85025 COMPLETE CBC W/AUTO DIFF WBC: CPT

## 2020-11-16 PROCEDURE — G0008 ADMIN INFLUENZA VIRUS VAC: HCPCS | Performed by: NURSE PRACTITIONER

## 2020-11-16 PROCEDURE — 36415 COLL VENOUS BLD VENIPUNCTURE: CPT

## 2020-11-16 PROCEDURE — 84443 ASSAY THYROID STIM HORMONE: CPT

## 2020-11-16 PROCEDURE — 80061 LIPID PANEL: CPT

## 2020-11-16 PROCEDURE — 80053 COMPREHEN METABOLIC PANEL: CPT

## 2020-11-16 PROCEDURE — G0439 PPPS, SUBSEQ VISIT: HCPCS | Performed by: NURSE PRACTITIONER

## 2020-11-16 PROCEDURE — 84439 ASSAY OF FREE THYROXINE: CPT

## 2020-11-16 PROCEDURE — 90662 IIV NO PRSV INCREASED AG IM: CPT | Performed by: NURSE PRACTITIONER

## 2020-11-16 RX ORDER — CHOLECALCIFEROL (VITAMIN D3) 125 MCG
5000 CAPSULE ORAL DAILY
COMMUNITY

## 2020-11-16 RX ORDER — VITAMIN E 268 MG
400 CAPSULE ORAL DAILY
COMMUNITY

## 2020-11-16 ASSESSMENT — ENCOUNTER SYMPTOMS: GENERAL WELL-BEING: EXCELLENT

## 2020-11-16 ASSESSMENT — FIBROSIS 4 INDEX: FIB4 SCORE: 1.16

## 2020-11-16 ASSESSMENT — ACTIVITIES OF DAILY LIVING (ADL): BATHING_REQUIRES_ASSISTANCE: 0

## 2020-12-18 ENCOUNTER — HOSPITAL ENCOUNTER (OUTPATIENT)
Dept: RADIOLOGY | Facility: MEDICAL CENTER | Age: 70
End: 2020-12-18
Attending: NURSE PRACTITIONER
Payer: MEDICARE

## 2020-12-18 DIAGNOSIS — Z12.31 ENCOUNTER FOR SCREENING MAMMOGRAM FOR BREAST CANCER: ICD-10-CM

## 2020-12-18 DIAGNOSIS — R92.30 DENSE BREAST TISSUE: ICD-10-CM

## 2020-12-18 PROCEDURE — 77067 SCR MAMMO BI INCL CAD: CPT

## 2020-12-18 PROCEDURE — 76641 ULTRASOUND BREAST COMPLETE: CPT

## 2020-12-24 DIAGNOSIS — I73.9 PAD (PERIPHERAL ARTERY DISEASE) (HCC): ICD-10-CM

## 2020-12-24 RX ORDER — CLOPIDOGREL BISULFATE 75 MG/1
TABLET ORAL
Qty: 90 TAB | Refills: 1 | Status: SHIPPED | OUTPATIENT
Start: 2020-12-24 | End: 2021-09-13

## 2020-12-24 NOTE — TELEPHONE ENCOUNTER
Received request via: Pharmacy    Was the patient seen in the last year in this department? Yes LOV 11/16/2020    Does the patient have an active prescription (recently filled or refills available) for medication(s) requested? No

## 2021-01-15 DIAGNOSIS — Z23 NEED FOR VACCINATION: ICD-10-CM

## 2021-01-27 DIAGNOSIS — E78.5 DYSLIPIDEMIA: ICD-10-CM

## 2021-01-27 RX ORDER — PAROXETINE 10 MG/1
TABLET, FILM COATED ORAL
Qty: 90 TAB | Refills: 3 | Status: SHIPPED | OUTPATIENT
Start: 2021-01-27 | End: 2022-02-03

## 2021-01-27 NOTE — TELEPHONE ENCOUNTER
Received request via: Pharmacy    Was the patient seen in the last year in this department? Yes  11/16/20  Does the patient have an active prescription (recently filled or refills available) for medication(s) requested? No

## 2021-01-28 RX ORDER — ROSUVASTATIN CALCIUM 5 MG/1
5 TABLET, COATED ORAL EVERY EVENING
Qty: 100 TAB | Refills: 3 | Status: SHIPPED | OUTPATIENT
Start: 2021-01-28 | End: 2021-05-05

## 2021-02-22 ENCOUNTER — HOSPITAL ENCOUNTER (OUTPATIENT)
Dept: RADIOLOGY | Facility: MEDICAL CENTER | Age: 71
End: 2021-02-22
Attending: NURSE PRACTITIONER
Payer: MEDICARE

## 2021-02-22 DIAGNOSIS — I67.1 INTRACRANIAL ANEURYSM: ICD-10-CM

## 2021-02-22 PROCEDURE — 70544 MR ANGIOGRAPHY HEAD W/O DYE: CPT | Mod: MH

## 2021-02-24 NOTE — PROGRESS NOTES
MRA reviewed with Dr. Rivero. Pt contacted with results of MRA to follow up multiple aneurysms, treated and untreated. No change compared to prior studies. Next MRA in 1 year.     Pt disclosed that she has been experiencing significant memory problems and has upcoming consultation with neurology service. IR notes are in EMR for review if needed and we are available for any specific questions or concerns.

## 2021-03-02 ENCOUNTER — OFFICE VISIT (OUTPATIENT)
Dept: NEUROLOGY | Facility: MEDICAL CENTER | Age: 71
End: 2021-03-02
Attending: PSYCHIATRY & NEUROLOGY
Payer: MEDICARE

## 2021-03-02 VITALS
HEIGHT: 61 IN | OXYGEN SATURATION: 97 % | SYSTOLIC BLOOD PRESSURE: 120 MMHG | WEIGHT: 103.4 LBS | TEMPERATURE: 96.8 F | DIASTOLIC BLOOD PRESSURE: 66 MMHG | HEART RATE: 81 BPM | BODY MASS INDEX: 19.52 KG/M2

## 2021-03-02 DIAGNOSIS — R41.89 COGNITIVE DEFICIT WITH IMPAIRED VISUOSPATIAL FUNCTION: ICD-10-CM

## 2021-03-02 DIAGNOSIS — G30.9 ALZHEIMER'S DISEASE, UNSPECIFIED (CODE) (HCC): ICD-10-CM

## 2021-03-02 DIAGNOSIS — R41.842 COGNITIVE DEFICIT WITH IMPAIRED VISUOSPATIAL FUNCTION: ICD-10-CM

## 2021-03-02 PROCEDURE — 99212 OFFICE O/P EST SF 10 MIN: CPT | Performed by: PSYCHIATRY & NEUROLOGY

## 2021-03-02 PROCEDURE — 99205 OFFICE O/P NEW HI 60 MIN: CPT | Performed by: PSYCHIATRY & NEUROLOGY

## 2021-03-02 ASSESSMENT — FIBROSIS 4 INDEX: FIB4 SCORE: 1.11

## 2021-03-02 ASSESSMENT — ENCOUNTER SYMPTOMS
MEMORY LOSS: 1
LOSS OF CONSCIOUSNESS: 0
DEPRESSION: 0
INSOMNIA: 0
FALLS: 0
HALLUCINATIONS: 0
HEADACHES: 0

## 2021-03-03 NOTE — PROGRESS NOTES
Subjective:      Reina Gandara is a 70 y.o. female who presents with her  Walter, from the office of QUOC Reyes, for consultation, with a history of cognitive impairment with short-term memory loss dating back a couple of years and which seems to have gotten a little bit worse.  Her  provides additional history and commentary.    CHARISSE Huang is a very pleasant 70-year-old right-handed woman who has noted some issues with short-term memory that has been ongoing for a brief time, she cannot be exactly clear when things started, but she is certainly aware of it now.  They both recount that she may repeat herself on an occasion, she might have some difficulty remembering recent events without being reminded, and circumstances like this, she may even forget details of these events.  Though infrequent, all of this is happening much more often.    As a result she is using lists more than she would normally.  With the list in hand, she does get things done.  Multitasking is a little more difficult, she is not more distractible than usual, it just simply takes her longer.  She requires use of organization and structure when doing things around the house, or simply completing her daily routines.  Things are done in usual fashion without issue as long she is not distracted.  She seems to be able to  where she leaves off, but it has become a bit more disconcerting.    With her medicine she is now finding the use of a medication organizer easier.  She has become aware that she may be more likely to forget pills.  She is now having to double check herself when doing the finances, finds that she is more likely to forget if she has paid a bill if not taken care of immediately.  Finances themselves are being completed appropriately.    She drives safely, does not get lost.  At home, her organization around the house, etc., is still the same as usual.  Her ADLs are undisturbed.  She  "has not left the house with the stove burning, water on, etc.  She uses her iPhone appropriately.  She is not misplacing objects more frequently with an inability to find them.  She does not get lost in the house.    Her personality and behavior have not been distorted.  She denies feeling depressed or more anxious than usual.  She sleeps well.  She denies any history of RBD or other REM activation behaviors.  She has never suffered from hallucinosis or paranoid delirium with significant confusion at nighttime.  Appetite and weight are stable.    Voice volume and clarity maintained, she has never had problems with tremor or loss of strength and dexterity with her hands.  She walks and steady fashion, certainly has not been falling with any kind of regularity.  Bowel and bladder functions are maintained.    Imaging of the brain has been done in the past, though for other reasons, demonstrating multiple aneurysms of the intracerebral vascular beds including the ophthalmic division of the right internal carotid artery, right middle cerebral artery and the cavernous portion of the left internal carotid artery.  Her most recent MRA from January, 2021 revealed successful ablation of both internal carotid artery lesions, the right middle cerebral artery remaining unchanged in size.  MRI of the brain from 2017 (complaints of \"memory loss\" on the request) demonstrated minimal generalized atrophy and periventricular white matter changes. From 2016, folic acid, B12 and thyroid functions were all normal.  From November, 2020, lipid profile showed total cholesterol 302, triglycerides 217, HDL 68 and   She has not been treated.    She has a history of dyslipidemia, mild depression and PVD, and she suffered from 2 isolated seizures about 25 years ago, never requiring long-term antiepileptic therapy.  She has no history of ischemic heart disease, cerebrovascular disease and stroke, diabetes, hypertension, autoimmune disease, " "liver or kidney disease, pulmonary disease, MS, migraine, or diagnosed neurodegenerative disease. .    Her surgical history is noncontributory from my standpoint.    She knows very little of her family's medical history.  She believes that her father had some type of intracerebral hemorrhagic event.  Her son does have bipolar disease.    She has a long history of tobacco use, she estimates never more than a pack per day, she is now down to about 3 cigarettes in a day.  She does not drink alcohol though had consumed on a more regular and daily basis for about 4 to 5 years associated with significant personal stressors.  Usually it was a weekend drink with social occasions.    She is on Plavix 75 mg daily, Crestor 5 mg every evening, vitamin D with calcium, Paxil 10 mg daily, vitamin C and vitamin B supplements along with vitamin D.    Review of Systems   Musculoskeletal: Negative for falls.   Neurological: Negative for loss of consciousness and headaches.   Psychiatric/Behavioral: Positive for memory loss. Negative for depression and hallucinations. The patient does not have insomnia.    All other systems reviewed and are negative.       Objective:     /66 (BP Location: Right arm, Patient Position: Sitting, BP Cuff Size: Adult)   Pulse 81   Temp 36 °C (96.8 °F) (Temporal)   Ht 1.549 m (5' 1\")   Wt 46.9 kg (103 lb 6.3 oz)   LMP  (LMP Unknown)   SpO2 97%   BMI 19.54 kg/m²      Physical Exam    She appears in no acute distress.  She is appropriately dressed, very pleasant in spirit and demeanor, quite cooperative.  Vital signs are stable.  There is no malar rash, jaw or temporal tenderness, or jaw claudication.  Her neck is supple, range of motion is full.  Cardiac evaluation reveals a regular rhythm.  There is no lower extremity edema.    Fully oriented, there is no aphasia, agnosia, apraxia, or inattention.  Her MoCA is 18/30.  She shows some deficits in several cognitive domains including visuospatial " and executive functions as well as memory formation and learning with delayed recall.  Language function is actually quite good.  Frontal release signs are absent, there is no rostrocaudal extinction.  Her mood is euthymic, affect is mood appropriate.    PERRLA/EOMI, visual fields are full to movement detection on confrontation bilaterally.  Funduscopic exam reveals sharp disc margin on the right.  There is no sensory loss of temperature on either side of the face.  Facial movements are symmetric.  The tongue and uvula are midline, there is no dysarthria.  Shoulder shrug and head rotation are intact and symmetric.    Musculoskeletal exam reveals normal tone bilaterally, there is no tremor, asterixis, or drift.  Strength is intact throughout.  Reflexes are present without asymmetries, the ankle jerks are relatively diminished bilaterally.  The toes are downgoing.    She stands easily, armswing is symmetric, station is normal.  Heel, toe, and tandem walking are all normal.  There is no appendicular dystaxia with any of the extremities.  Repetitive movements and fine motor control are intact in all 4 extremities with normal amplitude and symmetric frequencies.    Sensory exam is intact to vibration and temperature, Romberg is absent.     Assessment/Plan:     1. Cognitive deficit with impaired visuospatial function  She does have some cognitive impairment that I would cleanly say is beyond a simple aging process.  Today's examination findings, combined with the history that she and Walter provide suggest more of a global process.  Still, one cannot be more precise, and whether or not this becomes a more progressive process remains to be seen.  Some additional diagnostics are indicated to help better clarify what may be going on as well as identifying a cause.    We talked about the rationale for further diagnostic testing being done and the information that can be provided to rule out conditions that might be treatable.   They were also told that there is no singular diagnostic test that can pinpoint a specific degenerative process if that exists.  She was told that she does not as of yet fit criteria for actual dementia, simply a cognitive impairment affecting several different cognitive domains.  He can pain on MRI scan    MRI imaging of the brain does need to be repeated to be sure that there is no real change from the study from 5 years prior, also metabolic study to better classify a pattern of abnormal cortical cellular activity.  Some additional blood work culture should be checked for thoroughness.    For treatment, since medications are symptomatically based, I would elect to hold since she seems to be doing quite well compensating for the few deficits that she has.  She was encouraged to remain both physically as well as cognitively active, and that there are no specific types of cognitive exercises that are more effective than others.  She was encouraged to stop tobacco use entirely, she has already gotten off alcohol completely.  Maintaining good sleep hygiene is also important, fortunately she is not dealing with an obstructive breathing pattern.  We will follow-up in another 6 months for reevaluation, they were told I will call them with test results as they come in and if they are significant and warrant a different and more acute approach.  They were also encouraged to contact my office if they have any questions.    - CT-PET METABOLIC EVALUATION - BRAIN; Future  - MR-BRAIN-W/O; Future  - VITAMIN B12; Future  - FOLATE; Future  - SPEP W/REFLEX TO ACE PENDLETON G, M; Future    Time: 60 minutes spent face-to-face for exam, review, discussion, and education, of this over 50% of the time spent counseling and coordinating care.  No

## 2021-03-11 ENCOUNTER — HOSPITAL ENCOUNTER (OUTPATIENT)
Dept: RADIOLOGY | Facility: MEDICAL CENTER | Age: 71
End: 2021-03-11
Attending: PSYCHIATRY & NEUROLOGY
Payer: MEDICARE

## 2021-03-11 DIAGNOSIS — G30.9 ALZHEIMER'S DISEASE, UNSPECIFIED (CODE) (HCC): ICD-10-CM

## 2021-03-11 DIAGNOSIS — R41.89 COGNITIVE DEFICIT WITH IMPAIRED VISUOSPATIAL FUNCTION: ICD-10-CM

## 2021-03-11 DIAGNOSIS — R41.842 COGNITIVE DEFICIT WITH IMPAIRED VISUOSPATIAL FUNCTION: ICD-10-CM

## 2021-03-11 PROCEDURE — A9552 F18 FDG: HCPCS

## 2021-03-18 ENCOUNTER — APPOINTMENT (OUTPATIENT)
Dept: RADIOLOGY | Facility: MEDICAL CENTER | Age: 71
End: 2021-03-18
Attending: PSYCHIATRY & NEUROLOGY
Payer: MEDICARE

## 2021-03-19 ENCOUNTER — HOSPITAL ENCOUNTER (OUTPATIENT)
Dept: RADIOLOGY | Facility: MEDICAL CENTER | Age: 71
End: 2021-03-19
Attending: PSYCHIATRY & NEUROLOGY
Payer: MEDICARE

## 2021-03-19 DIAGNOSIS — R41.89 COGNITIVE DEFICIT WITH IMPAIRED VISUOSPATIAL FUNCTION: ICD-10-CM

## 2021-03-19 DIAGNOSIS — R41.842 COGNITIVE DEFICIT WITH IMPAIRED VISUOSPATIAL FUNCTION: ICD-10-CM

## 2021-03-19 PROCEDURE — 70551 MRI BRAIN STEM W/O DYE: CPT | Mod: MH

## 2021-04-29 ENCOUNTER — OFFICE VISIT (OUTPATIENT)
Dept: URGENT CARE | Facility: CLINIC | Age: 71
End: 2021-04-29
Payer: MEDICARE

## 2021-04-29 VITALS
HEIGHT: 61 IN | RESPIRATION RATE: 16 BRPM | BODY MASS INDEX: 19.26 KG/M2 | HEART RATE: 90 BPM | TEMPERATURE: 99.1 F | WEIGHT: 102 LBS | OXYGEN SATURATION: 95 % | DIASTOLIC BLOOD PRESSURE: 60 MMHG | SYSTOLIC BLOOD PRESSURE: 120 MMHG

## 2021-04-29 DIAGNOSIS — R04.0 EPISTAXIS: ICD-10-CM

## 2021-04-29 PROCEDURE — 99213 OFFICE O/P EST LOW 20 MIN: CPT | Performed by: PHYSICIAN ASSISTANT

## 2021-04-29 ASSESSMENT — FIBROSIS 4 INDEX: FIB4 SCORE: 1.11

## 2021-05-05 ASSESSMENT — ENCOUNTER SYMPTOMS
FEVER: 0
NAUSEA: 0
SHORTNESS OF BREATH: 0
DIARRHEA: 0
ABDOMINAL PAIN: 0
MUSCULOSKELETAL NEGATIVE: 1
VOMITING: 0
DIZZINESS: 0
CHILLS: 0

## 2021-05-05 NOTE — PROGRESS NOTES
"Subjective:      Reina Gandara is a 70 y.o. female who presents with Epistaxis (x 3 hours)        Patient is accompanied by her .     Epistaxis   The bleeding has been from the left nare. This is a new problem. The current episode started today. The problem occurs constantly. The problem has been resolved. She has tried pressure and ice for the symptoms. The treatment provided significant relief. There is no history of a bleeding disorder.     Patient presents to urgent care reporting a left side nose bleed starting approximately 3 hours PTA. She's had problems with nosebleeds in the past. Symptoms started after rubbing her nose. She applied ice and pressure to the area afterwards and applied a nasal clamp before coming into urgent care. She is currently taking Plavix. She denies headaches, lightheadedness, or fatigue.       Review of Systems   Constitutional: Negative for chills and fever.   HENT: Positive for nosebleeds.    Respiratory: Negative for shortness of breath.    Cardiovascular: Negative for chest pain.   Gastrointestinal: Negative for abdominal pain, diarrhea, nausea and vomiting.   Genitourinary: Negative.    Musculoskeletal: Negative.    Skin: Negative for rash.   Neurological: Negative for dizziness.        Objective:     /60 (BP Location: Right arm, Patient Position: Sitting, BP Cuff Size: Adult)   Pulse 90   Temp 37.3 °C (99.1 °F) (Temporal)   Resp 16   Ht 1.549 m (5' 1\")   Wt 46.3 kg (102 lb)   LMP  (LMP Unknown)   SpO2 95%   BMI 19.27 kg/m²      Physical Exam  Vitals and nursing note reviewed.   Constitutional:       General: She is not in acute distress.     Appearance: Normal appearance. She is well-developed. She is not diaphoretic.   HENT:      Head: Normocephalic and atraumatic.      Right Ear: External ear normal.      Left Ear: External ear normal.      Nose: Mucosal edema present.      Right Nostril: No foreign body, epistaxis or occlusion.      Left " Nostril: No foreign body, epistaxis or occlusion.      Comments: No active epistaxis noted.      Mouth/Throat:      Mouth: Mucous membranes are moist.      Pharynx: Uvula midline. No oropharyngeal exudate or posterior oropharyngeal erythema.   Eyes:      Conjunctiva/sclera: Conjunctivae normal.   Cardiovascular:      Rate and Rhythm: Normal rate.   Pulmonary:      Effort: Pulmonary effort is normal.   Musculoskeletal:         General: Normal range of motion.      Cervical back: Normal range of motion.   Skin:     General: Skin is warm and dry.   Neurological:      Mental Status: She is alert and oriented to person, place, and time.   Psychiatric:         Behavior: Behavior normal.              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  MEDS:   Current Outpatient Medications:   •  PARoxetine (PAXIL) 10 MG Tab, TAKE 1 TABLET BY MOUTH EVERY DAY WITH FOOD, Disp: 90 Tab, Rfl: 3  •  clopidogrel (PLAVIX) 75 MG Tab, TAKE 1 TABLET BY MOUTH EVERY DAY, Disp: 90 Tab, Rfl: 1  •  Omega-3 Fatty Acids (FISH OIL) 600 MG Cap, Take  by mouth., Disp: , Rfl:   •  vitamin e (VITAMIN E) 400 UNIT Cap, Take 400 Units by mouth every day., Disp: , Rfl:   •  cholecalciferol (D-3-5) 5000 UNIT Cap, Take 5,000 Units by mouth every day., Disp: , Rfl:   •  B Complex Vitamins (B COMPLEX B-12 PO), Take  by mouth., Disp: , Rfl:   •  therapeutic multivitamin-minerals (THERAGRAN-M) Tab, Take 1 Tab by mouth every day., Disp: , Rfl:   •  Ascorbic Acid (VITAMIN C CR) 1000 MG Tab CR, Take 1 Tab by mouth every day.,  Disp: , Rfl:   ALLERGIES:   Allergies   Allergen Reactions   • Bee Anaphylaxis   • Nsaids Swelling     ADVIL  2 trips to ER w reactions of rash swelling with difficulty breathing.   • Codeine Nausea     dizzy   • Keflex Vomiting     No trouble with amoxil     SURGHX:   Past Surgical History:   Procedure Laterality Date   • THROMBECTOMY Right 5/25/2018    Procedure: FEMORAL ENDARTERECTOMY, ANGIOPLASTY WITH PATCH, FOREIGN BODY REMOVAL;  Surgeon: Carina Durbin M.D.;  Location: SURGERY [facility];  Service: General   • RECOVERY  10/4/2013    Performed by [facility] at SURGERY SAME DAY [facility]   • FEMORAL POPLITEAL BYPASS  7/7/2013    Performed by Carina Durbin M.D. at SURGERY [facility]   • RECOVERY  7/6/2013    Performed by [facility] at SURGERY [facility]   • ABDOMINAL HYSTERECTOMY TOTAL  1981    w/o BSO due to fibroids   • APPENDECTOMY      during hysterectomy   • OTHER      neuroma removed from feet 4-5x   • OTHER      abdominal stent   • TONSILLECTOMY     • WRIST ORIF       SOCHX:  reports that she has been smoking cigarettes. She has a 12.50 pack-year smoking history. She has never used smokeless tobacco. She reports previous alcohol use of about 0.6 oz of alcohol per week. She reports that she does not use drugs.  FH: family history includes Hyperlipidemia in her sister; Lung Disease in her sister; Other in her paternal grandfather and sister.       Assessment/Plan:        1. Epistaxis      Patient given 2 sprays of Reinaldo-Synephrine in each nostril at today's visit. Patient monitored for 15 minutes without any recurrent bleeding. Encouraged to avoid rubbing nose. Discussed use of Vaseline in internal nares over the next few days. Seek medical attention if uncontrolled epistaxis returns. The patient and her  demonstrated a good understanding and agreed with the treatment plan.

## 2021-06-15 ENCOUNTER — OFFICE VISIT (OUTPATIENT)
Dept: NEUROLOGY | Facility: MEDICAL CENTER | Age: 71
End: 2021-06-15
Attending: PSYCHIATRY & NEUROLOGY
Payer: MEDICARE

## 2021-06-15 VITALS
TEMPERATURE: 97.8 F | BODY MASS INDEX: 19.19 KG/M2 | WEIGHT: 101.63 LBS | HEART RATE: 82 BPM | DIASTOLIC BLOOD PRESSURE: 58 MMHG | HEIGHT: 61 IN | OXYGEN SATURATION: 96 % | SYSTOLIC BLOOD PRESSURE: 114 MMHG

## 2021-06-15 DIAGNOSIS — F02.80 LATE ONSET ALZHEIMER'S DEMENTIA WITHOUT BEHAVIORAL DISTURBANCE (HCC): Primary | ICD-10-CM

## 2021-06-15 DIAGNOSIS — G30.1 LATE ONSET ALZHEIMER'S DEMENTIA WITHOUT BEHAVIORAL DISTURBANCE (HCC): Primary | ICD-10-CM

## 2021-06-15 PROCEDURE — 99211 OFF/OP EST MAY X REQ PHY/QHP: CPT | Performed by: PSYCHIATRY & NEUROLOGY

## 2021-06-15 PROCEDURE — 99214 OFFICE O/P EST MOD 30 MIN: CPT | Performed by: PSYCHIATRY & NEUROLOGY

## 2021-06-15 ASSESSMENT — FIBROSIS 4 INDEX: FIB4 SCORE: 1.11

## 2021-06-15 ASSESSMENT — ENCOUNTER SYMPTOMS
MEMORY LOSS: 1
FALLS: 0

## 2021-06-15 NOTE — PROGRESS NOTES
"Subjective:      Reina Gandara is a 70 y.o. female who presents with her  Walter, for follow-up, seen with complaints of cognitive impairments with test results consistent with mild cognitive impairment, possible early dementia.    HPI    Since last seen just 3 months ago, obviously, there has not been much of a change clinically.  Reina is still making use of her system as of order and structure to allow her to remain independent.  She makes lists, uses a calendar, compensates well when mistakes are made.  She has routine at home of placing objects in certain locations, organizing rooms around the home, etc.  She limits what she does at the same time, it is easier to do one task at the time and sequence.  She is driving safely.  She still engages socially.  She is independent with her ADLs.  She has not had any distortions with appetite or weight, sleep hygiene, etc.    MRI of the brain revealed nonspecific vascular changes and atrophy, TSH, B12, folate and SPEP values all normal. Head CT did reveal selective hypometabolism in the temporal and right parietal lobes consistent with possible Alzheimer's disease.    Medical, surgical and family histories are reviewed, there are no new drug allergies. She remains on Paxil 10 mg nightly for anxiety, she is wondering if this is still necessary. Otherwise she is on Plavix and vitamin and mineral supplements.    Review of Systems   Musculoskeletal: Negative for falls.   Psychiatric/Behavioral: Positive for memory loss.   All other systems reviewed and are negative.       Objective:     /58 (BP Location: Right arm, Patient Position: Sitting)   Pulse 82   Temp 36.6 °C (97.8 °F) (Temporal)   Ht 1.549 m (5' 1\")   Wt 46.1 kg (101 lb 10.1 oz)   LMP  (LMP Unknown)   SpO2 96%   BMI 19.20 kg/m²      Physical Exam    She appears in no acute distress. Vital signs are stable. There is no malar rash. The neck is supple. Cardiac evaluation is " unremarkable.    Neurological Exam    Deferred for today. Examination time was spent essentially reviewing test results as well as clinical impressions, diagnoses, prognosis, treatment options, etc.     Assessment/Plan:     1. Late onset Alzheimer's dementia without behavioral disturbance (HCC)  She was told that she does have dementia, and all likelihood given the results of the work-up, this will turn out to be due to Alzheimer's disease. We talked at length about different causes of dementia and why I suspect Alzheimer's is the cause of hers. She was told that hers is still fairly mild, she is learning to compensate well for the symptoms that she has, and that treatment options now available are symptomatically based; thus they can be held until symptoms are severe enough. These treatments are not indicated to slow the disease process down. Though there was the recent authorization by the FDA of aducanumab as a possible treatment to affect disease progression, this is not something that I feel is appropriate for her at this time. She is quite comfortable with that. We talked about this at length.    With Paxil, she was told that this can be increased in dose if she felt it necessary. She does acknowledge some depressed feelings especially given the diagnosis, but she is working through this. She is sleeping well. For now we can simply observe, though I recommended a therapist being found which can be helpful and may avert the need for medication.    For now we will simply follow-up in 1 year. They were told to call the office in the interim if there are other more acute issues that arise.    Time: 25 minutes spent face-to-face for exam, review, discussion, and education, of this over 50% of the time spent counseling and coordinating care.

## 2021-08-25 ENCOUNTER — OFFICE VISIT (OUTPATIENT)
Dept: MEDICAL GROUP | Facility: PHYSICIAN GROUP | Age: 71
End: 2021-08-25
Payer: MEDICARE

## 2021-08-25 VITALS
DIASTOLIC BLOOD PRESSURE: 60 MMHG | OXYGEN SATURATION: 98 % | BODY MASS INDEX: 18.43 KG/M2 | WEIGHT: 97.6 LBS | SYSTOLIC BLOOD PRESSURE: 130 MMHG | HEART RATE: 69 BPM | HEIGHT: 61 IN | RESPIRATION RATE: 14 BRPM | TEMPERATURE: 98.1 F

## 2021-08-25 DIAGNOSIS — I67.1 ANEURYSM OF MIDDLE CEREBRAL ARTERY: ICD-10-CM

## 2021-08-25 DIAGNOSIS — Z13.0 SCREENING FOR DEFICIENCY ANEMIA: ICD-10-CM

## 2021-08-25 DIAGNOSIS — F32.9 REACTIVE DEPRESSION: ICD-10-CM

## 2021-08-25 DIAGNOSIS — F17.200 CURRENT SMOKER: ICD-10-CM

## 2021-08-25 DIAGNOSIS — Z72.0 TOBACCO ABUSE: ICD-10-CM

## 2021-08-25 DIAGNOSIS — Z78.0 POST-MENOPAUSAL: ICD-10-CM

## 2021-08-25 DIAGNOSIS — I10 ESSENTIAL HYPERTENSION: ICD-10-CM

## 2021-08-25 DIAGNOSIS — Z13.29 THYROID DISORDER SCREEN: ICD-10-CM

## 2021-08-25 DIAGNOSIS — I67.1 ANEURYSM OF INTERNAL CAROTID ARTERY: ICD-10-CM

## 2021-08-25 DIAGNOSIS — G30.1 LATE ONSET ALZHEIMER'S DEMENTIA WITHOUT BEHAVIORAL DISTURBANCE (HCC): ICD-10-CM

## 2021-08-25 DIAGNOSIS — I73.9 PAD (PERIPHERAL ARTERY DISEASE) (HCC): ICD-10-CM

## 2021-08-25 DIAGNOSIS — E78.5 DYSLIPIDEMIA: ICD-10-CM

## 2021-08-25 DIAGNOSIS — F02.80 LATE ONSET ALZHEIMER'S DEMENTIA WITHOUT BEHAVIORAL DISTURBANCE (HCC): ICD-10-CM

## 2021-08-25 PROBLEM — G30.9 ALZHEIMER'S DEMENTIA (HCC): Status: ACTIVE | Noted: 2021-08-25

## 2021-08-25 PROBLEM — G30.9 ALZHEIMER'S DEMENTIA (HCC): Chronic | Status: ACTIVE | Noted: 2021-08-25

## 2021-08-25 PROCEDURE — 99214 OFFICE O/P EST MOD 30 MIN: CPT | Performed by: INTERNAL MEDICINE

## 2021-08-25 ASSESSMENT — PATIENT HEALTH QUESTIONNAIRE - PHQ9
9. THOUGHTS THAT YOU WOULD BE BETTER OFF DEAD, OR OF HURTING YOURSELF: NOT AT ALL
SUM OF ALL RESPONSES TO PHQ9 QUESTIONS 1 AND 2: 4
5. POOR APPETITE OR OVEREATING: NOT AT ALL
SUM OF ALL RESPONSES TO PHQ QUESTIONS 1-9: 7
3. TROUBLE FALLING OR STAYING ASLEEP OR SLEEPING TOO MUCH: SEVERAL DAYS
1. LITTLE INTEREST OR PLEASURE IN DOING THINGS: MORE THAN HALF THE DAYS
2. FEELING DOWN, DEPRESSED, IRRITABLE, OR HOPELESS: MORE THAN HALF THE DAYS
7. TROUBLE CONCENTRATING ON THINGS, SUCH AS READING THE NEWSPAPER OR WATCHING TELEVISION: NOT AT ALL
4. FEELING TIRED OR HAVING LITTLE ENERGY: NOT AT ALL
6. FEELING BAD ABOUT YOURSELF - OR THAT YOU ARE A FAILURE OR HAVE LET YOURSELF OR YOUR FAMILY DOWN: MORE THAN HALF THE DAYS
8. MOVING OR SPEAKING SO SLOWLY THAT OTHER PEOPLE COULD HAVE NOTICED. OR THE OPPOSITE, BEING SO FIGETY OR RESTLESS THAT YOU HAVE BEEN MOVING AROUND A LOT MORE THAN USUAL: NOT AT ALL

## 2021-08-25 ASSESSMENT — FIBROSIS 4 INDEX: FIB4 SCORE: 1.13

## 2021-08-25 NOTE — PROGRESS NOTES
Subjective:     CC: Establish care    HISTORY OF THE PRESENT ILLNESS: Patient is a 71 y.o. female. This pleasant patient is here today to establish care and discuss the following issues:    The patient states she was recently diagnosed with Alzheimer's dementia after a 2 to 3-year history of progressive memory loss.  She is now followed by Dr. Salvador.  She is slowly adjusting to the diagnosis.  She is also on paroxetine for some anxiety and depression, but is not know if the medication is working for her.  She is not interested, however, in adjusting the medication dose.    Allergies: Bee, Nsaids, Codeine, and Keflex    Current Outpatient Medications Ordered in Epic   Medication Sig Dispense Refill   • PARoxetine (PAXIL) 10 MG Tab TAKE 1 TABLET BY MOUTH EVERY DAY WITH FOOD 90 Tab 3   • clopidogrel (PLAVIX) 75 MG Tab TAKE 1 TABLET BY MOUTH EVERY DAY 90 Tab 1   • Omega-3 Fatty Acids (FISH OIL) 600 MG Cap Take  by mouth.     • vitamin e (VITAMIN E) 400 UNIT Cap Take 400 Units by mouth every day.     • cholecalciferol (D-3-5) 5000 UNIT Cap Take 5,000 Units by mouth every day.     • B Complex Vitamins (B COMPLEX B-12 PO) Take  by mouth.     • therapeutic multivitamin-minerals (THERAGRAN-M) Tab Take 1 Tab by mouth every day.     • Ascorbic Acid (VITAMIN C CR) 1000 MG Tab CR Take 1 Tab by mouth every day.       No current [facility]-ordered facility-administered medications on file.       Past Medical History:   Diagnosis Date   • Acute nasopharyngitis     cold 2/1/20   • Blood clotting disorder (HCC) 2016/2017    DVT   • Contact dermatitis due to cosmetics 9/16/2015   • Environmental and seasonal allergies    • Guillain Barré syndrome (HCC)     Hx    • Hemorrhagic disorder (HCC)     plavix   • History of kidney stones     x2 episodes   • History of seizures     x2 episodes - no treatment needed   • Hyperlipidemia    • Hypertension    • Intracranial aneurysm    • PAD (peripheral artery disease) 5/7/2014   • Psychiatric problem   "   depression   • Reactive depression 9/17/2013   • Renal disorder     stones   • Seizure (HCC) 1988    unknown cause       Past Surgical History:   Procedure Laterality Date   • THROMBECTOMY Right 5/25/2018    Procedure: FEMORAL ENDARTERECTOMY, ANGIOPLASTY WITH PATCH, FOREIGN BODY REMOVAL;  Surgeon: Carina Durbin M.D.;  Location: SURGERY [facility];  Service: General   • RECOVERY  10/4/2013    Performed by [facility] at SURGERY SAME DAY [facility]   • FEMORAL POPLITEAL BYPASS  7/7/2013    Performed by Carina Durbin M.D. at SURGERY [facility]   • RECOVERY  7/6/2013    Performed by [facility] at SURGERY [facility]   • ABDOMINAL HYSTERECTOMY TOTAL  1981    w/o BSO due to fibroids   • APPENDECTOMY      during hysterectomy   • OTHER      neuroma removed from feet 4-5x   • OTHER      abdominal stent   • TONSILLECTOMY     • WRIST ORIF         Social History     Tobacco Use   • Smoking status: Light Tobacco Smoker     Packs/day: 0.25     Years: 50.00     Pack years: 12.50     Types: Cigarettes   • Smokeless tobacco: Never Used   • Tobacco comment: 1/2 ppd down to 4 cig/day since 4/13, down to 2 cig/day since 1014   Vaping Use   • Vaping Use: Never used   Substance Use Topics   • Alcohol use: Not Currently     Alcohol/week: 0.6 oz     Types: 1 Glasses of wine per week   • Drug use: No       Social History     Social History Narrative   • Not on file       Family History   Problem Relation Age of Onset   • Other Sister         tremors / instability   • Lung Disease Sister         COPD    • Hyperlipidemia Sister    • Other Paternal Grandfather         brain aneurysm       Health Maintenance: Completed    ROS:   Gen: no fevers/chills  Pulm: no sob, no cough  CV: no chest pain, no palpitations  GI: no nausea/vomiting, no diarrhea  Neuro: no headaches, no numbness/tingling      Objective:     Exam: /60   Pulse 69   Temp 36.7 °C (98.1 °F)   Resp 14   Ht 1.549 m (5' 1\")   Wt " 44.3 kg (97 lb 9.6 oz)   SpO2 98%  Body mass index is 18.44 kg/m².    General: Normal appearing. No distress.  HEENT: Normocephalic. Eyes conjunctiva clear lids without ptosis, pupils equal and reactive to light accommodation, oropharynx is without erythema, edema or exudates.   Pulmonary: Clear to ausculation.  Normal effort. No rales, ronchi, or wheezing.  Cardiovascular: Regular rate and rhythm without murmur.   Abdomen: Soft, nontender, nondistended.   Musculoskeletal: No extremity cyanosis, clubbing, or edema.  Psych: Normal mood and affect. Alert and oriented x3. Judgment and insight is normal.    Assessment & Plan:   71 y.o. female with the following -    Late onset Alzheimer's dementia without behavioral disturbance (HCC)  This is a chronic condition. Stable. Followed by neurology, Dr. Salvador. MRI of the brain revealed nonspecific vascular changes and atrophy, TSH, B12, folate and SPEP values all normal. Head CT did reveal selective hypometabolism in the temporal and right parietal lobes consistent with possible Alzheimer's disease.     Dyslipidemia  PAD (peripheral artery disease) (HCC)  Aneurysm of internal carotid artery  Aneurysm of middle cerebral artery  This is a chronic condition.  Not at goal.  Lipid panel from 11/16/2020 showed a total cholesterol 302, , HDL 68, triglycerides 217. The 10-year ASCVD risk score (Davidson DC Jr., et al., 2013) is: 20.9% No recent labs.  The patient has previously been on rosuvastatin 5 mg nightly.  Patient has had 3 different brain coiling procedures for cerebral aneurysms.  The patient is on Plavix, followed by Dr. Durbin.  -Continue clopidogrel 75 mg daily  - Comp Metabolic Panel; Future  - Lipid Profile; Future    Reactive depression  This is a chronic condition.  Well-controlled.  The patient is on paroxetine 10 mg daily.  The patient is unable to tell how helpful it is.  She is not interested in dose changes at this time.  -Continue paroxetine 10 mg  daily    Current smoker  Tobacco abuse  This is a chronic condition.  The patient states she started smoking at age 11, she has smoked approximately half a pack per day.  She states that she has smoked about as much as her sister, who is in the lung cancer screening program.    -Smoking cessation was encouraged  - REFERRAL TO LUNG CANCER SCREENING PROGRAM    Screening for deficiency anemia  - CBC WITHOUT DIFFERENTIAL; Future    Thyroid disorder screen  - TRIIDOTHYRONINE; Future  - TSH; Future  - FREE THYROXINE; Future    Post-menopausal  - DS-BONE DENSITY STUDY (DEXA); Future      Return in about 3 months (around 11/25/2021) for f/u labs.    Please note that this dictation was created using voice recognition software. I have made every reasonable attempt to correct obvious errors, but I expect that there are errors of grammar and possibly content that I did not discover before finalizing the note.

## 2021-08-25 NOTE — PATIENT INSTRUCTIONS
You will be contacted for lung cancer screening program  Call to schedule the bine density  Get fasting labs

## 2021-08-30 ENCOUNTER — TELEPHONE (OUTPATIENT)
Dept: HEMATOLOGY ONCOLOGY | Facility: MEDICAL CENTER | Age: 71
End: 2021-08-30

## 2021-08-30 NOTE — TELEPHONE ENCOUNTER
Received referral to lung cancer screening program.  Chart review to assess for lung cancer screening program eligibility.   1. Age 55-77 yrs of age? Yes 71 y.o.  2. 30 pack year hx of smoking, or greater? Yes 0.5 [id number]= [id number] hx  3. Current smoker or if quit, has pt quit within last 15 yrs?Yes  Current smoker  4. Any signs or symptoms of lung cancer? None noted  5. Previous history of lung cancer? None noted  6. Chest CT within past 12 mos.? None noted  Patient does meet eligibility criteria. [facility] scheduling notified to schedule the shared decision making visit.

## 2021-09-10 ENCOUNTER — HOSPITAL ENCOUNTER (OUTPATIENT)
Dept: RADIOLOGY | Facility: MEDICAL CENTER | Age: 71
End: 2021-09-10
Attending: INTERNAL MEDICINE
Payer: MEDICARE

## 2021-09-10 DIAGNOSIS — I73.9 PAD (PERIPHERAL ARTERY DISEASE) (HCC): ICD-10-CM

## 2021-09-10 DIAGNOSIS — Z78.0 POST-MENOPAUSAL: ICD-10-CM

## 2021-09-10 PROCEDURE — 77080 DXA BONE DENSITY AXIAL: CPT

## 2021-09-13 RX ORDER — CLOPIDOGREL BISULFATE 75 MG/1
TABLET ORAL
Qty: 90 TABLET | Refills: 1 | Status: SHIPPED | OUTPATIENT
Start: 2021-09-13 | End: 2022-09-07

## 2021-09-27 ENCOUNTER — PATIENT MESSAGE (OUTPATIENT)
Dept: HEALTH INFORMATION MANAGEMENT | Facility: OTHER | Age: 71
End: 2021-09-27

## 2021-09-28 ENCOUNTER — HOSPITAL ENCOUNTER (OUTPATIENT)
Dept: LAB | Facility: MEDICAL CENTER | Age: 71
End: 2021-09-28
Attending: INTERNAL MEDICINE
Payer: MEDICARE

## 2021-09-28 DIAGNOSIS — E78.5 DYSLIPIDEMIA: ICD-10-CM

## 2021-09-28 DIAGNOSIS — Z13.0 SCREENING FOR DEFICIENCY ANEMIA: ICD-10-CM

## 2021-09-28 DIAGNOSIS — Z13.29 THYROID DISORDER SCREEN: ICD-10-CM

## 2021-09-28 LAB
ALBUMIN SERPL BCP-MCNC: 4 G/DL (ref 3.2–4.9)
ALBUMIN/GLOB SERPL: 1.5 G/DL
ALP SERPL-CCNC: 91 U/L (ref 30–99)
ALT SERPL-CCNC: 10 U/L (ref 2–50)
ANION GAP SERPL CALC-SCNC: 10 MMOL/L (ref 7–16)
AST SERPL-CCNC: 19 U/L (ref 12–45)
BILIRUB SERPL-MCNC: 0.6 MG/DL (ref 0.1–1.5)
BUN SERPL-MCNC: 9 MG/DL (ref 8–22)
CALCIUM SERPL-MCNC: 8.1 MG/DL (ref 8.5–10.5)
CHLORIDE SERPL-SCNC: 105 MMOL/L (ref 96–112)
CHOLEST SERPL-MCNC: 317 MG/DL (ref 100–199)
CO2 SERPL-SCNC: 24 MMOL/L (ref 20–33)
CREAT SERPL-MCNC: 0.45 MG/DL (ref 0.5–1.4)
ERYTHROCYTE [DISTWIDTH] IN BLOOD BY AUTOMATED COUNT: 49.5 FL (ref 35.9–50)
FASTING STATUS PATIENT QL REPORTED: NORMAL
GLOBULIN SER CALC-MCNC: 2.7 G/DL (ref 1.9–3.5)
GLUCOSE SERPL-MCNC: 89 MG/DL (ref 65–99)
HCT VFR BLD AUTO: 41.8 % (ref 37–47)
HDLC SERPL-MCNC: 52 MG/DL
HGB BLD-MCNC: 13.8 G/DL (ref 12–16)
LDLC SERPL CALC-MCNC: 244 MG/DL
MCH RBC QN AUTO: 31.9 PG (ref 27–33)
MCHC RBC AUTO-ENTMCNC: 33 G/DL (ref 33.6–35)
MCV RBC AUTO: 96.8 FL (ref 81.4–97.8)
PLATELET # BLD AUTO: 295 K/UL (ref 164–446)
PMV BLD AUTO: 9.8 FL (ref 9–12.9)
POTASSIUM SERPL-SCNC: 4.4 MMOL/L (ref 3.6–5.5)
PROT SERPL-MCNC: 6.7 G/DL (ref 6–8.2)
RBC # BLD AUTO: 4.32 M/UL (ref 4.2–5.4)
SODIUM SERPL-SCNC: 139 MMOL/L (ref 135–145)
T3 SERPL-MCNC: 98.9 NG/DL (ref 60–181)
T4 FREE SERPL-MCNC: 1.18 NG/DL (ref 0.93–1.7)
TRIGL SERPL-MCNC: 106 MG/DL (ref 0–149)
TSH SERPL DL<=0.005 MIU/L-ACNC: 1.71 UIU/ML (ref 0.38–5.33)
WBC # BLD AUTO: 6.8 K/UL (ref 4.8–10.8)

## 2021-09-28 PROCEDURE — 84480 ASSAY TRIIODOTHYRONINE (T3): CPT

## 2021-09-28 PROCEDURE — 80061 LIPID PANEL: CPT

## 2021-09-28 PROCEDURE — 84443 ASSAY THYROID STIM HORMONE: CPT

## 2021-09-28 PROCEDURE — 85027 COMPLETE CBC AUTOMATED: CPT

## 2021-09-28 PROCEDURE — 84439 ASSAY OF FREE THYROXINE: CPT

## 2021-09-28 PROCEDURE — 80053 COMPREHEN METABOLIC PANEL: CPT

## 2021-09-28 PROCEDURE — 36415 COLL VENOUS BLD VENIPUNCTURE: CPT

## 2021-09-29 ENCOUNTER — TELEPHONE (OUTPATIENT)
Dept: HEMATOLOGY ONCOLOGY | Facility: MEDICAL CENTER | Age: 71
End: 2021-09-29

## 2021-09-29 NOTE — LETTER
October 12, 2021        Reina Gandara  [address] [address]        Dear Reina:    After several attempts we have been unable to contact you regarding your missed appointment in our office.     Please call our office at [phone] and ask for Medical Oncology at your earliest convenience.     Thank you for your prompt attention to this matter.           [facility]       If you have any questions or concerns, please don't hesitate to call.        Sincerely,        CAPRICE Staley.    Electronically Signed

## 2021-09-30 ENCOUNTER — TELEPHONE (OUTPATIENT)
Dept: MEDICAL GROUP | Facility: PHYSICIAN GROUP | Age: 71
End: 2021-09-30

## 2021-10-01 ENCOUNTER — OFFICE VISIT (OUTPATIENT)
Dept: MEDICAL GROUP | Facility: PHYSICIAN GROUP | Age: 71
End: 2021-10-01
Payer: MEDICARE

## 2021-10-01 VITALS
SYSTOLIC BLOOD PRESSURE: 120 MMHG | OXYGEN SATURATION: 98 % | RESPIRATION RATE: 20 BRPM | BODY MASS INDEX: 18.17 KG/M2 | TEMPERATURE: 98.3 F | HEART RATE: 76 BPM | WEIGHT: 96.25 LBS | HEIGHT: 61 IN | DIASTOLIC BLOOD PRESSURE: 78 MMHG

## 2021-10-01 DIAGNOSIS — E83.51 HYPOCALCEMIA: ICD-10-CM

## 2021-10-01 DIAGNOSIS — F32.9 REACTIVE DEPRESSION: ICD-10-CM

## 2021-10-01 DIAGNOSIS — E78.5 DYSLIPIDEMIA: ICD-10-CM

## 2021-10-01 DIAGNOSIS — M81.0 AGE-RELATED OSTEOPOROSIS WITHOUT CURRENT PATHOLOGICAL FRACTURE: ICD-10-CM

## 2021-10-01 PROCEDURE — 99214 OFFICE O/P EST MOD 30 MIN: CPT | Performed by: INTERNAL MEDICINE

## 2021-10-01 RX ORDER — ROSUVASTATIN CALCIUM 10 MG/1
10 TABLET, COATED ORAL EVERY EVENING
Qty: 90 TABLET | Refills: 3 | Status: SHIPPED | OUTPATIENT
Start: 2021-10-01 | End: 2022-03-13

## 2021-10-01 ASSESSMENT — FIBROSIS 4 INDEX: FIB4 SCORE: 1.49

## 2021-10-01 ASSESSMENT — PATIENT HEALTH QUESTIONNAIRE - PHQ9: CLINICAL INTERPRETATION OF PHQ2 SCORE: 0

## 2021-10-01 NOTE — TELEPHONE ENCOUNTER
Called the patient and notified her of her Labs and patient did receive her labs in [facility] and the note from Dr. Gale.  Patient has appointment with Dr. Gale tomorrow morning to discuss more in detail with her labs.

## 2021-10-01 NOTE — PROGRESS NOTES
Subjective:     CC: Follow-up on DEXA results    HPI:   Reina presents today to discuss the following issues:    The patient feels well.  No acute complaints.  With regard to the patient's osteoporosis, she wonders why she has it as she performs daily weightbearing exercises and also takes a vitamin D supplement.  She does have below the gumline dental work planned for this coming January, there are plans for her to get a dental implant.  She states that she does not like how the Paxil makes her feel, and makes her very tired.  She has not taken it in a week and is feeling better.  She has not been taking her cholesterol medication.  She is also following a ketogenic diet for her Alzheimer's.      Past Medical History:   Diagnosis Date   • Acute nasopharyngitis     cold 2/1/20   • Blood clotting disorder (HCC) 2016/2017    DVT   • Contact dermatitis due to cosmetics 9/16/2015   • Environmental and seasonal allergies    • Guillain Barré syndrome (HCC)     Hx    • Hemorrhagic disorder (HCC)     plavix   • History of kidney stones     x2 episodes   • History of seizures     x2 episodes - no treatment needed   • Hyperlipidemia    • Hypertension    • Intracranial aneurysm    • PAD (peripheral artery disease) 5/7/2014   • Psychiatric problem     depression   • Reactive depression 9/17/2013   • Renal disorder     stones   • Seizure (HCC) 1988    unknown cause       Social History     Tobacco Use   • Smoking status: Light Tobacco Smoker     Packs/day: 0.25     Years: 50.00     Pack years: 12.50     Types: Cigarettes   • Smokeless tobacco: Never Used   • Tobacco comment: 1/2 ppd down to 4 cig/day since 4/13, down to 2 cig/day since 1014   Vaping Use   • Vaping Use: Never used   Substance Use Topics   • Alcohol use: Not Currently     Alcohol/week: 0.6 oz     Types: 1 Glasses of wine per week   • Drug use: No       Current Outpatient Medications Ordered in Epic   Medication Sig Dispense Refill   • rosuvastatin (CRESTOR) 10  "MG Tab Take 1 Tablet by mouth every evening. 90 Tablet 3   • clopidogrel (PLAVIX) 75 MG Tab TAKE 1 TABLET BY MOUTH EVERY DAY 90 Tablet 1   • PARoxetine (PAXIL) 10 MG Tab TAKE 1 TABLET BY MOUTH EVERY DAY WITH FOOD 90 Tab 3   • Omega-3 Fatty Acids (FISH OIL) 600 MG Cap Take  by mouth.     • vitamin e (VITAMIN E) 400 UNIT Cap Take 400 Units by mouth every day.     • cholecalciferol (D-3-5) 5000 UNIT Cap Take 5,000 Units by mouth every day.     • B Complex Vitamins (B COMPLEX B-12 PO) Take  by mouth.     • therapeutic multivitamin-minerals (THERAGRAN-M) Tab Take 1 Tab by mouth every day.     • Ascorbic Acid (VITAMIN C CR) 1000 MG Tab CR Take 1 Tab by mouth every day.       No current [facility]-ordered facility-administered medications on file.       Allergies:  Bee, Nsaids, Codeine, and Keflex    Health Maintenance: Completed    ROS:   Denies any recent fevers or chills. No nausea or vomiting. No chest pains or shortness of breath.      Objective:       Exam:  /78 (BP Location: Left arm, Patient Position: Sitting, BP Cuff Size: Adult)   Pulse 76   Temp 36.8 °C (98.3 °F) (Temporal)   Resp 20   Ht 1.549 m (5' 1\")   Wt 43.7 kg (96 lb 4 oz)   LMP  (LMP Unknown)   SpO2 98%   Breastfeeding No   BMI 18.19 kg/m²  Body mass index is 18.19 kg/m².    Gen: Alert and oriented, No apparent distress.      Assessment & Plan:     71 y.o. female with the following -     Age-related osteoporosis without current pathological fracture  This is a chronic condition.  Progressive.  Bone density study on 9/10/2021 osteopenia of the lumbar region and osteoporosis of the left femoral region with a significant worsening since her prior DEXA study in 2015.  The patient's 10-year probability of a major osteoporotic fracture is 16.2%, and hip fracture 7.9%.  -Start calcium and vitamin D supplementation as well as daily weightbearing exercises  -We will defer starting bisphosphonate therapy at this time as the patient is scheduled for a " dental implant this coming January.  We will follow up in January to reevaluate.    Dyslipidemia  This is a chronic condition.  Not at goal.    Lipid panel from 9/28/2021 showed a total cholesterol of 317, , HDL 52, triglycerides 106.  The 10-year ASCVD risk score (Bernemiranda HOLM Jr., et al., 2013) is: 21.4% .  The patient states she has not been taking her cholesterol medication.  She also reports a rise in her cholesterol because she started the ketogenic diet for her Alzheimer's dementia.  -Resume rosuvastatin 10 mg nightly  - Comp Metabolic Panel; Future  - Lipid Profile; Future  - rosuvastatin (CRESTOR) 10 MG Tab; Take 1 Tablet by mouth every evening.  Dispense: 90 Tablet; Refill: 3    Hypocalcemia  This is an acute condition.  Labs from 9/28/2021 showed a reduced calcium level of 8.1.  Previous calcium levels have been normal.  -Start calcium carbonate 500 mg 2000 mg (Tums, 1 to 2 tablets) daily   - VITAMIN D,25 HYDROXY; Future  - IONIZED CALCIUM; Future    Reactive depression  This is a chronic condition.  Stable. The patient is on paroxetine 10 mg daily, but does not like the way it makes her feel.  It causes excessive fatigue.  She has not taken it in the last week and is feeling better.  I did advise against suddenly stopping antidepressant medications.  -Continue to hold the paroxetine 10 mg daily for now      Return in about 3 months (around 1/1/2022) for f/u labs.    Please note that this dictation was created using voice recognition software. I have made every reasonable attempt to correct obvious errors, but I expect that there are errors of grammar and possibly content that I did not discover before finalizing the note.

## 2021-10-12 NOTE — TELEPHONE ENCOUNTER
Attempted to call pt, number not in service could not lvm to renetta an appt.  Will send letter 3rd attempt

## 2022-02-25 ENCOUNTER — HOSPITAL ENCOUNTER (OUTPATIENT)
Dept: RADIOLOGY | Facility: MEDICAL CENTER | Age: 72
End: 2022-02-25
Attending: NURSE PRACTITIONER
Payer: MEDICARE

## 2022-02-25 DIAGNOSIS — I67.1 INTRACRANIAL ANEURYSM: ICD-10-CM

## 2022-02-25 PROCEDURE — 70544 MR ANGIOGRAPHY HEAD W/O DYE: CPT

## 2022-03-04 ENCOUNTER — OFFICE VISIT (OUTPATIENT)
Dept: MEDICAL GROUP | Facility: PHYSICIAN GROUP | Age: 72
End: 2022-03-04
Payer: MEDICARE

## 2022-03-04 VITALS
OXYGEN SATURATION: 97 % | DIASTOLIC BLOOD PRESSURE: 60 MMHG | WEIGHT: 96.25 LBS | HEIGHT: 61 IN | HEART RATE: 84 BPM | RESPIRATION RATE: 18 BRPM | TEMPERATURE: 99.1 F | SYSTOLIC BLOOD PRESSURE: 120 MMHG | BODY MASS INDEX: 18.17 KG/M2

## 2022-03-04 DIAGNOSIS — G30.1 LATE ONSET ALZHEIMER'S DEMENTIA WITHOUT BEHAVIORAL DISTURBANCE (HCC): Chronic | ICD-10-CM

## 2022-03-04 DIAGNOSIS — F32.9 REACTIVE DEPRESSION: ICD-10-CM

## 2022-03-04 DIAGNOSIS — F02.80 LATE ONSET ALZHEIMER'S DEMENTIA WITHOUT BEHAVIORAL DISTURBANCE (HCC): Chronic | ICD-10-CM

## 2022-03-04 PROCEDURE — 99213 OFFICE O/P EST LOW 20 MIN: CPT | Performed by: INTERNAL MEDICINE

## 2022-03-04 ASSESSMENT — PATIENT HEALTH QUESTIONNAIRE - PHQ9: CLINICAL INTERPRETATION OF PHQ2 SCORE: 0

## 2022-03-04 ASSESSMENT — FIBROSIS 4 INDEX: FIB4 SCORE: 1.49

## 2022-03-04 NOTE — PROGRESS NOTES
Subjective:     CC:   Chief Complaint   Patient presents with   • Medication Follow-up     Depression meds         HPI:   Reina presents today to discuss the following issues:    Late onset Alzheimer's dementia without behavioral disturbance (HCC)  The patient is followed by neurology, Dr. Salvador. MRI of the brain revealed nonspecific vascular changes and atrophy.  Brain PET CT revealed selective hypometabolism in the temporal and right parietal lobes consistent with possible Alzheimer's disease. TSH, B12, folate and SPEP values all normal.  The patient and her  are doing a great deal of research on their own regarding nutritional changes that can be made to help slow the progression of her dementia.  The best research they have been able to locate is in regard to the ketogenic diet, which the patient is trying to follow.  She is also getting regular exercise.     Reactive depression  This is a chronic medical condition.  Stable. The patient is on paroxetine 10 mg daily, but does not like the way it makes her feel.  It causes excessive fatigue.        Past Medical History:   Diagnosis Date   • Acute nasopharyngitis     cold 2/1/20   • Blood clotting disorder (HCC) 2016/2017    DVT   • Contact dermatitis due to cosmetics 9/16/2015   • Environmental and seasonal allergies    • Guillain Barré syndrome (HCC)     Hx    • Hemorrhagic disorder (HCC)     plavix   • History of kidney stones     x2 episodes   • History of seizures     x2 episodes - no treatment needed   • Hyperlipidemia    • Hypertension    • Intracranial aneurysm    • PAD (peripheral artery disease) 5/7/2014   • Psychiatric problem     depression   • Reactive depression 9/17/2013   • Renal disorder     stones   • Seizure (HCC) 1988    unknown cause       Social History     Tobacco Use   • Smoking status: Light Tobacco Smoker     Packs/day: 0.25     Years: 50.00     Pack years: 12.50     Types: Cigarettes   • Smokeless tobacco: Never Used   •  "Tobacco comment: 1/2 ppd down to 4 cig/day since 4/13, down to 2 cig/day since 1014   Vaping Use   • Vaping Use: Never used   Substance Use Topics   • Alcohol use: Not Currently     Alcohol/week: 0.6 oz     Types: 1 Glasses of wine per week   • Drug use: No       Current Outpatient Medications Ordered in Epic   Medication Sig Dispense Refill   • PARoxetine (PAXIL) 10 MG Tab TAKE 1 TABLET BY MOUTH EVERY DAY WITH FOOD 100 Tablet 3   • clopidogrel (PLAVIX) 75 MG Tab TAKE 1 TABLET BY MOUTH EVERY DAY 90 Tablet 1   • Omega-3 Fatty Acids (FISH OIL) 600 MG Cap Take  by mouth.     • vitamin e (VITAMIN E) 400 UNIT Cap Take 400 Units by mouth every day.     • cholecalciferol (D-3-5) 5000 UNIT Cap Take 5,000 Units by mouth every day.     • B Complex Vitamins (B COMPLEX B-12 PO) Take  by mouth.     • therapeutic multivitamin-minerals (THERAGRAN-M) Tab Take 1 Tab by mouth every day.     • Ascorbic Acid (VITAMIN C CR) 1000 MG Tab CR Take 1 Tab by mouth every day.     • rosuvastatin (CRESTOR) 10 MG Tab Take 1 Tablet by mouth every evening. 90 Tablet 3     No current Epic-ordered facility-administered medications on file.       Allergies:  Bee, Nsaids, Codeine, and Keflex    Health Maintenance: Completed    ROS:   Denies any recent fevers or chills. No nausea or vomiting. No chest pains or shortness of breath.      Objective:       Exam:  /60 (BP Location: Right arm, Patient Position: Sitting, BP Cuff Size: Adult)   Pulse 84   Temp 37.3 °C (99.1 °F) (Temporal)   Resp 18   Ht 1.549 m (5' 1\")   Wt 43.7 kg (96 lb 4 oz)   LMP  (LMP Unknown)   SpO2 97%   Breastfeeding No   BMI 18.19 kg/m²  Body mass index is 18.19 kg/m².    Gen: Alert and oriented, No apparent distress.      Assessment & Plan:     71 y.o. female with the following -        Late onset Alzheimer's dementia without behavioral disturbance (HCC)  This is a chronic medical condition. Stable. Followed by neurology, Dr. Salvador. MRI of the brain revealed " nonspecific vascular changes and atrophy.  Brain PET CT revealed selective hypometabolism in the temporal and right parietal lobes consistent with possible Alzheimer's disease. TSH, B12, folate and SPEP values all normal.  The patient and her  are doing a great deal of research on their own regarding nutritional changes that can be made to help slow the progression of her dementia.  The best research they have been able to locate is in regard to the ketogenic diet, which the patient is trying to follow.  She is also getting regular exercise.  -Continue management per neurology, ketogenic diet and regular exercise     Reactive depression  This is a chronic medical condition.  Stable. The patient is on paroxetine 10 mg daily, but does not like the way it makes her feel.  It causes excessive fatigue.    -Continue paroxetine 10 mg daily for now     I spent a total of 22 minutes with record review, exam, communication with the patient, communication with other providers, and documentation of this encounter.      Return in about 3 months (around 6/4/2022) for 3-month f/u visit.    Please note that this dictation was created using voice recognition software. I have made every reasonable attempt to correct obvious errors, but I expect that there are errors of grammar and possibly content that I did not discover before finalizing the note.

## 2022-03-05 NOTE — PROGRESS NOTES
Annual Health Assessment Questions:    1.  Are you currently engaging in any exercise or physical activity? Yes    2.  How would you describe your mood or emotional well-being today? depressed    3.  Have you had any falls in the last year? No    4.  Have you noticed any problems with your balance or had difficulty walking? No    5.  In the last six months have you experienced any leakage of urine? No    6. DPA/Advanced Directive: Patient does not have an Advanced Directive.  A packet and workshop information was given on Advanced Directives.

## 2022-05-04 PROBLEM — I77.9 DISORDER OF ARTERIES AND ARTERIOLES (HCC): Status: ACTIVE | Noted: 2022-05-04

## 2022-05-04 PROBLEM — D68.69 SECONDARY HYPERCOAGULABLE STATE (HCC): Status: ACTIVE | Noted: 2022-05-04

## 2022-05-04 PROBLEM — F33.1 MODERATE EPISODE OF RECURRENT MAJOR DEPRESSIVE DISORDER (HCC): Status: ACTIVE | Noted: 2022-05-04

## 2022-09-07 DIAGNOSIS — I73.9 PAD (PERIPHERAL ARTERY DISEASE) (HCC): ICD-10-CM

## 2022-09-07 RX ORDER — CLOPIDOGREL BISULFATE 75 MG/1
TABLET ORAL
Qty: 90 TABLET | Refills: 1 | Status: SHIPPED | OUTPATIENT
Start: 2022-09-07 | End: 2023-04-28

## 2022-09-08 ENCOUNTER — OFFICE VISIT (OUTPATIENT)
Dept: URGENT CARE | Facility: PHYSICIAN GROUP | Age: 72
End: 2022-09-08
Payer: MEDICARE

## 2022-09-08 VITALS
SYSTOLIC BLOOD PRESSURE: 126 MMHG | HEIGHT: 61 IN | DIASTOLIC BLOOD PRESSURE: 70 MMHG | HEART RATE: 75 BPM | BODY MASS INDEX: 17.67 KG/M2 | RESPIRATION RATE: 16 BRPM | TEMPERATURE: 98.2 F | WEIGHT: 93.6 LBS | OXYGEN SATURATION: 98 %

## 2022-09-08 DIAGNOSIS — Z79.01 CHRONIC ANTICOAGULATION: ICD-10-CM

## 2022-09-08 DIAGNOSIS — H11.31 SUBCONJUNCTIVAL HEMORRHAGE OF RIGHT EYE: Primary | ICD-10-CM

## 2022-09-08 PROCEDURE — 99214 OFFICE O/P EST MOD 30 MIN: CPT | Performed by: NURSE PRACTITIONER

## 2022-09-08 ASSESSMENT — VISUAL ACUITY
OD_CC: 20/70
OU: 1
OS_CC: 20/30

## 2022-09-08 ASSESSMENT — FIBROSIS 4 INDEX: FIB4 SCORE: 1.51

## 2022-09-08 NOTE — PROGRESS NOTES
Reina Gandara is a 72 y.o. female who presents for Eye Problem (Red eye, right side x 2 days)    Accompanied by her   HPI Reina is a 72-year-old female presents with right eye redness.  Symptoms started 2 days ago.  They got much worse yesterday.  Her entire right eye is red now.  She denies unusual activity, trauma or straining.  She thinks she may have rubbed her eye.  She is on chronic anticoagulation therapy but has been on the same dose for 20 years.  She has never had a red eye like this before.  She denies vision change, drainage, headache, fever, chills. She is not experiencing any unusual bruising or other bleeding.     ROS see HPI    Allergies:       Allergies   Allergen Reactions   • Bee Anaphylaxis   • Nsaids Swelling     ADVIL  2 trips to ER w reactions of rash swelling with difficulty breathing.   • Codeine Nausea     dizzy   • Keflex Vomiting     No trouble with amoxil       PMSFS Hx:  Past Medical History:   Diagnosis Date   • Acute nasopharyngitis     cold 2/1/20   • Blood clotting disorder (HCC) 2016/2017    DVT   • Contact dermatitis due to cosmetics 9/16/2015   • Environmental and seasonal allergies    • Guillain Barré syndrome (HCC)     Hx    • Hemorrhagic disorder (HCC)     plavix   • History of kidney stones     x2 episodes   • History of seizures     x2 episodes - no treatment needed   • Hyperlipidemia    • Hypertension    • Intracranial aneurysm    • PAD (peripheral artery disease) 5/7/2014   • Psychiatric problem     depression   • Reactive depression 9/17/2013   • Renal disorder     stones   • Seizure (HCC) 1988    unknown cause     Past Surgical History:   Procedure Laterality Date   • THROMBECTOMY Right 5/25/2018    Procedure: FEMORAL ENDARTERECTOMY, ANGIOPLASTY WITH PATCH, FOREIGN BODY REMOVAL;  Surgeon: Carina Durbin M.D.;  Location: SURGERY [facility];  Service: General   • RECOVERY  10/4/2013    Performed by [facility] at SURGERY SAME DAY [facility]  ORS   • FEMORAL POPLITEAL BYPASS  7/7/2013    Performed by Carina Durbin M.D. at SURGERY [facility] ORS   • RECOVERY  7/6/2013    Performed by [facility] at SURGERY [facility]   • ABDOMINAL HYSTERECTOMY TOTAL  1981    w/o BSO due to fibroids   • APPENDECTOMY      during hysterectomy   • ORIF, WRIST     • OTHER      neuroma removed from feet 4-5x   • OTHER      abdominal stent   • TONSILLECTOMY       Family History   Problem Relation Age of Onset   • Other Sister         tremors / instability   • Lung Disease Sister         COPD    • Hyperlipidemia Sister    • Other Paternal Grandfather         brain aneurysm     Social History     Tobacco Use   • Smoking status: Some Days     Packs/day: 0.50     Years: 50.00     Pack years: 25.00     Types: Cigarettes   • Smokeless tobacco: Never   Substance Use Topics   • Alcohol use: Not Currently     Alcohol/week: 0.6 oz     Types: 1 Glasses of wine per week       Problems:   Patient Active Problem List   Diagnosis   • History of seizures   • History of kidney stones   • Reactive depression   • Hx of Guillain-[place] syndrome   • Dyslipidemia   • H/O bee sting allergy   • PAD (peripheral artery disease) ([facility])   • Irritant contact dermatitis due to other agents   • Abnormal TSH   • Tobacco abuse   • Aneurysm of internal carotid artery   • Aneurysm of middle cerebral artery   • Aneurysm of left internal carotid artery   • Moderate protein-calorie malnutrition (HCC)   • History of left common carotid artery stent placement   • History of femoral artery thrombosis   • Alzheimer's dementia ([facility])   • Age related osteoporosis   • Hypocalcemia   • Body mass index (BMI) of 19.0 to 19.9 in adult   • Moderate episode of recurrent major depressive disorder (HCC)   • Disorder of arteries and arterioles ([facility])   • Secondary hypercoagulable state ([facility])       Medications:   Current Outpatient Medications on File Prior to Visit   Medication Sig Dispense Refill   • clopidogrel  "(PLAVIX) 75 MG Tab TAKE 1 TABLET BY MOUTH EVERY DAY 90 Tablet 1   • PARoxetine (PAXIL) 10 MG Tab TAKE 1 TABLET BY MOUTH EVERY DAY WITH FOOD 100 Tablet 3   • Omega-3 Fatty Acids (FISH OIL) 600 MG Cap Take  by mouth.     • vitamin e (VITAMIN E) 400 UNIT Cap Take 400 Units by mouth every day.     • cholecalciferol (D-3-5) 5000 UNIT Cap Take 5,000 Units by mouth every day.     • B Complex Vitamins (B COMPLEX B-12 PO) Take  by mouth.     • therapeutic multivitamin-minerals (THERAGRAN-M) Tab Take 1 Tab by mouth every day.     • Ascorbic Acid (VITAMIN C CR) 1000 MG Tab CR Take 1 Tab by mouth every day.       No current facility-administered medications on file prior to visit.          Objective:     /70 (BP Location: Left arm, Patient Position: Sitting, BP Cuff Size: Adult)   Pulse 75   Temp 36.8 °C (98.2 °F) (Temporal)   Resp 16   Ht 1.549 m (5' 1\")   Wt 42.5 kg (93 lb 9.6 oz)   LMP  (LMP Unknown)   SpO2 98%   BMI 17.69 kg/m²     Physical Exam  Vitals and nursing note reviewed.   Constitutional:       General: She is not in acute distress.     Appearance: Normal appearance. She is well-developed and well-groomed.   HENT:      Head: Normocephalic.   Eyes:      General: Lids are normal. Lids are everted, no foreign bodies appreciated. Vision grossly intact.         Right eye: No foreign body.         Left eye: No foreign body or discharge.      Extraocular Movements:      Right eye: Normal extraocular motion and no nystagmus.      Left eye: Normal extraocular motion and no nystagmus.      Conjunctiva/sclera:      Right eye: Hemorrhage present.      Left eye: No hemorrhage.     Pupils: Pupils are equal, round, and reactive to light.      Funduscopic exam:     Right eye: No hemorrhage or exudate. Red reflex present.         Left eye: No hemorrhage or exudate. Red reflex present.       Comments: Wears glasses.    Cardiovascular:      Rate and Rhythm: Normal rate.      Pulses: Normal pulses.      Heart sounds: " Normal heart sounds.   Pulmonary:      Effort: Pulmonary effort is normal.   Musculoskeletal:      Cervical back: Full passive range of motion without pain and normal range of motion.   Skin:     General: Skin is warm and dry.      Capillary Refill: Capillary refill takes less than 2 seconds.   Neurological:      Mental Status: She is alert and oriented to person, place, and time.   Psychiatric:         Mood and Affect: Mood normal.         Speech: Speech normal.         Behavior: Behavior normal. Behavior is cooperative.         Thought Content: Thought content normal.         Assessment /Associated Orders:      1. Subconjunctival hemorrhage of right eye        2. Chronic anticoagulation                Medical Decision Making:    Pt is clinically stable at today's acute urgent care visit.  No acute distress noted. Appropriate for outpatient care at this time.   Acute problem today .  This problem resolves without specific treatment. No underlying concerning etiology today.  BP is normal. No systemic concerns. Vision is reported as normal for her with her glasses.     Cool compress prn     Stay in cool environment     Do not rub eye   Discussed Dx, management options (risks,benefits, and alternatives to planned treatment), natural progression and supportive care.  Expressed understanding and the treatment plan was agreed upon.   Questions were encouraged and answered   Return to urgent care prn if new or worsening sx or if there is no improvement in condition prn.    Educated in Red flags and indications to immediately call 911 or present to the Emergency Department.       Time I spent evaluating Reina Gandara in urgent care today was 31  minutes. This time includes preparing for visit, reviewing any pertinent notes or test results, counseling/education, exam, obtaining HPI, interpretation of lab tests, medication management and documentation as indicated above.Time does not include separately billable  procedures noted .       Please note that this dictation was created using voice recognition software. I have worked with consultants from the vendor as well as technical experts from [employer] [facility] to optimize the interface. I have made every reasonable attempt to correct obvious errors, but I expect that there are errors of grammar and possibly content that I did not discover before finalizing the note.  This note was electronically signed by provider

## 2022-11-22 ENCOUNTER — DOCUMENTATION (OUTPATIENT)
Dept: HEALTH INFORMATION MANAGEMENT | Facility: OTHER | Age: 72
End: 2022-11-22
Payer: MEDICARE

## 2022-12-13 NOTE — ASSESSMENT & PLAN NOTE
Chronic issue.  Lipid improvement was not significant with pravastatin and now does well with atorvastatin.  + PVD and brain aneurysms.   Took a break from atorvastatin for a few months and restarted it as soon as she saw her labs last week.     Qbrexza Pregnancy And Lactation Text: There is no available data on Qbrexza use in pregnant women.  There is no available data on Qbrexza use in lactation.

## 2023-01-06 NOTE — TELEPHONE ENCOUNTER
Received request via: Pharmacy    Was the patient seen in the last year in this department? Yes  LOV 08/25/2021  Does the patient have an active prescription (recently filled or refills available) for medication(s) requested? No  
Vaccine status unknown

## 2023-02-06 RX ORDER — PAROXETINE 10 MG/1
TABLET, FILM COATED ORAL
Qty: 100 TABLET | Refills: 3 | Status: SHIPPED | OUTPATIENT
Start: 2023-02-06

## 2023-03-06 ENCOUNTER — TELEPHONE (OUTPATIENT)
Dept: HEALTH INFORMATION MANAGEMENT | Facility: OTHER | Age: 73
End: 2023-03-06

## 2023-03-08 NOTE — PROGRESS NOTES
Subjective:     CC: Annual wellness exam      HPI:           Past Medical History:   Diagnosis Date    Acute nasopharyngitis     cold 2/1/20    Blood clotting disorder (HCC) 2016/2017    DVT    Contact dermatitis due to cosmetics 9/16/2015    Environmental and seasonal allergies     Guillain Barré syndrome (HCC)     Hx     Hemorrhagic disorder (HCC)     plavix    History of kidney stones     x2 episodes    History of seizures     x2 episodes - no treatment needed    Hyperlipidemia     Hypertension     Intracranial aneurysm     PAD (peripheral artery disease) 5/7/2014    Psychiatric problem     depression    Reactive depression 9/17/2013    Renal disorder     stones    Seizure (HCC) 1988    unknown cause       Social History     Tobacco Use    Smoking status: Some Days     Packs/day: 0.50     Years: 50.00     Pack years: 25.00     Types: Cigarettes    Smokeless tobacco: Never   Vaping Use    Vaping Use: Never used   Substance Use Topics    Alcohol use: Not Currently     Alcohol/week: 0.6 oz     Types: 1 Glasses of wine per week    Drug use: No       Current Outpatient Medications Ordered in Epic   Medication Sig Dispense Refill    PARoxetine (PAXIL) 10 MG Tab TAKE 1 TABLET BY MOUTH EVERY DAY WITH FOOD 100 Tablet 3    clopidogrel (PLAVIX) 75 MG Tab TAKE 1 TABLET BY MOUTH EVERY DAY 90 Tablet 1    Omega-3 Fatty Acids (FISH OIL) 600 MG Cap Take  by mouth.      vitamin e (VITAMIN E) 400 UNIT Cap Take 400 Units by mouth every day.      cholecalciferol (D-3-5) 5000 UNIT Cap Take 5,000 Units by mouth every day.      B Complex Vitamins (B COMPLEX B-12 PO) Take  by mouth.      therapeutic multivitamin-minerals (THERAGRAN-M) Tab Take 1 Tab by mouth every day.      Ascorbic Acid (VITAMIN C CR) 1000 MG Tab CR Take 1 Tab by mouth every day.       No current [facility]-ordered facility-administered medications on file.       Allergies:  Bee, Nsaids, Codeine, and Keflex    Health Maintenance: Completed    Review of Systems:  No fevers or  chills. No cough, chest pain, or shortness of breath.     Review of Systems:  Constitutional: Negative for fever, chills.  Respiratory: Negative for cough and shortness of breath.    Cardiovascular: Negative for chest pain or palpitations.  Gastrointestinal: Negative for abdominal pain, nausea, vomiting, and diarrhea.   Neurological: Negative for headaches, numbness, or tingling.  Psychiatric: Negative for anxiety or depression.      Objective:       Vitals:  LMP  (LMP Unknown)  There is no height or weight on file to calculate BMI.    Physical Exam:  Constitutional: Well-developed, no acute distress.  Lungs: Clear to auscultation bilaterally. No wheezes, ronchi, or rales.   Cardiovascular: Regular rate and rhythm, no murmurs noted.   Extremities: No edema or erythema.    Physical Exam:  Constitutional: Well-developed, no acute distress.  HEENT: Pupils are equal, round, and reactive to light. Oropharynx is without erythema, edema or exudates.   Neck: No thyromegaly or palpable thyroid nodules. No cervical or supraclavicular lymphadenopathy noted.  Lungs: Clear to auscultation bilaterally. No wheezes, rhonchi, or rales.   Cardiovascular: Regular rate and rhythm, no murmurs noted.   Abdomen: Soft, nontender, nondistended.   Extremities: No edema or erythema.  Psychiatric:  Behavior, mood, and affect are appropriate.      Assessment & Plan:     Reina Gandara is a 72-year-old female who presents for annual wellness exam.  She feels well and has no acute medical complaints.The patient feels well and denies any acute medical complaints.  The patient's past medical and surgical history, medications, allergies, and family history were reviewed.  The patient is up to date on labs, immunizations, and other preventative care.       Health Maintenance  Diet:   Exercise:     Substance Use:   Sunscreen used.    Wellness examination  The patient feels well and denies any complaints. There are no concerning signs and/or  symptoms of any significant disease process.  Normal exam findings.  The patient was counseled about regular exercise, healthy diet, abstinence from smoking, drugs, and excessive alcohol.     HCM: Completed  Labs per orders  Immunizations per orders        No follow-ups on file.    Please note that this dictation was created using voice recognition software. I have made every reasonable attempt to correct obvious errors, but I expect that there are errors of grammar and possibly content that I did not discover before finalizing the note.

## 2023-03-10 ENCOUNTER — OFFICE VISIT (OUTPATIENT)
Dept: MEDICAL GROUP | Facility: PHYSICIAN GROUP | Age: 73
End: 2023-03-10
Payer: MEDICARE

## 2023-03-10 VITALS
WEIGHT: 98.6 LBS | OXYGEN SATURATION: 97 % | DIASTOLIC BLOOD PRESSURE: 56 MMHG | SYSTOLIC BLOOD PRESSURE: 116 MMHG | HEIGHT: 61 IN | HEART RATE: 90 BPM | BODY MASS INDEX: 18.61 KG/M2 | TEMPERATURE: 99.2 F

## 2023-03-10 DIAGNOSIS — I67.1 ANEURYSM OF LEFT INTERNAL CAROTID ARTERY: ICD-10-CM

## 2023-03-10 DIAGNOSIS — E44.0 MODERATE PROTEIN-CALORIE MALNUTRITION (HCC): ICD-10-CM

## 2023-03-10 DIAGNOSIS — I73.9 PAD (PERIPHERAL ARTERY DISEASE) (HCC): ICD-10-CM

## 2023-03-10 DIAGNOSIS — F33.1 MODERATE EPISODE OF RECURRENT MAJOR DEPRESSIVE DISORDER (HCC): ICD-10-CM

## 2023-03-10 DIAGNOSIS — I67.1 ANEURYSM OF MIDDLE CEREBRAL ARTERY: ICD-10-CM

## 2023-03-10 DIAGNOSIS — Z13.29 THYROID DISORDER SCREENING: ICD-10-CM

## 2023-03-10 DIAGNOSIS — Z00.00 ENCOUNTER FOR MEDICARE ANNUAL WELLNESS EXAM: ICD-10-CM

## 2023-03-10 DIAGNOSIS — F02.B0 MODERATE LATE ONSET ALZHEIMER'S DEMENTIA WITHOUT BEHAVIORAL DISTURBANCE, PSYCHOTIC DISTURBANCE, MOOD DISTURBANCE, OR ANXIETY (HCC): Chronic | ICD-10-CM

## 2023-03-10 DIAGNOSIS — G30.1 MODERATE LATE ONSET ALZHEIMER'S DEMENTIA WITHOUT BEHAVIORAL DISTURBANCE, PSYCHOTIC DISTURBANCE, MOOD DISTURBANCE, OR ANXIETY (HCC): Chronic | ICD-10-CM

## 2023-03-10 DIAGNOSIS — Z13.0 SCREENING FOR DEFICIENCY ANEMIA: ICD-10-CM

## 2023-03-10 DIAGNOSIS — I77.9 DISORDER OF ARTERIES AND ARTERIOLES (HCC): ICD-10-CM

## 2023-03-10 DIAGNOSIS — Z13.1 ENCOUNTER FOR SCREENING FOR DIABETES MELLITUS: ICD-10-CM

## 2023-03-10 DIAGNOSIS — E78.5 DYSLIPIDEMIA: ICD-10-CM

## 2023-03-10 PROCEDURE — G0439 PPPS, SUBSEQ VISIT: HCPCS | Performed by: INTERNAL MEDICINE

## 2023-03-10 ASSESSMENT — ACTIVITIES OF DAILY LIVING (ADL): BATHING_REQUIRES_ASSISTANCE: 0

## 2023-03-10 ASSESSMENT — PATIENT HEALTH QUESTIONNAIRE - PHQ9
SUM OF ALL RESPONSES TO PHQ QUESTIONS 1-9: 8
7. TROUBLE CONCENTRATING ON THINGS, SUCH AS READING THE NEWSPAPER OR WATCHING TELEVISION: MORE THAN HALF THE DAYS
4. FEELING TIRED OR HAVING LITTLE ENERGY: SEVERAL DAYS
1. LITTLE INTEREST OR PLEASURE IN DOING THINGS: NOT AT ALL
8. MOVING OR SPEAKING SO SLOWLY THAT OTHER PEOPLE COULD HAVE NOTICED. OR THE OPPOSITE, BEING SO FIGETY OR RESTLESS THAT YOU HAVE BEEN MOVING AROUND A LOT MORE THAN USUAL: NOT AT ALL
CLINICAL INTERPRETATION OF PHQ2 SCORE: 0
6. FEELING BAD ABOUT YOURSELF - OR THAT YOU ARE A FAILURE OR HAVE LET YOURSELF OR YOUR FAMILY DOWN: NEARLY EVERY DAY
3. TROUBLE FALLING OR STAYING ASLEEP OR SLEEPING TOO MUCH: SEVERAL DAYS
9. THOUGHTS THAT YOU WOULD BE BETTER OFF DEAD, OR OF HURTING YOURSELF: NOT AT ALL
SUM OF ALL RESPONSES TO PHQ9 QUESTIONS 1 AND 2: 1
5. POOR APPETITE OR OVEREATING: NOT AT ALL
2. FEELING DOWN, DEPRESSED, IRRITABLE, OR HOPELESS: SEVERAL DAYS

## 2023-03-10 ASSESSMENT — ENCOUNTER SYMPTOMS: GENERAL WELL-BEING: FAIR

## 2023-03-10 ASSESSMENT — FIBROSIS 4 INDEX: FIB4 SCORE: 1.51

## 2023-03-10 NOTE — PROGRESS NOTES
Chief Complaint   Patient presents with    Medicare Annual Wellness       HPI:  Reina Gandara is a 72 y.o. here for Medicare Annual Wellness Visit     Patient Active Problem List    Diagnosis Date Noted    Body mass index (BMI) of 19.0 to 19.9 in adult 05/04/2022    Moderate episode of recurrent major depressive disorder (HCC) 05/04/2022    Disorder of arteries and arterioles (HCC) 05/04/2022    Secondary hypercoagulable state (HCC) 05/04/2022    Age related osteoporosis 10/01/2021    Hypocalcemia 10/01/2021    Alzheimer's dementia ([facility]) 08/25/2021    Moderate protein-calorie malnutrition ([facility]) 01/17/2019    History of left common carotid artery stent placement 01/17/2019    History of femoral artery thrombosis 01/17/2019    Aneurysm of left internal carotid artery 10/22/2018    Aneurysm of middle cerebral artery 05/26/2018    Tobacco abuse 05/25/2018    Irritant contact dermatitis due to other agents 01/16/2018    Abnormal TSH 01/16/2018    PAD (peripheral artery disease) ([facility]) 01/25/2016    H/O bee sting allergy 08/26/2014    Dyslipidemia 08/11/2014    Reactive depression 09/17/2013    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      vitamin e (VITAMIN E) 400 UNIT Cap Take 400 Units by mouth every day.      cholecalciferol (D-3-5) 5000 UNIT Cap Take 5,000 Units by mouth every day.      B Complex Vitamins (B COMPLEX B-12 PO) Take  by mouth.      therapeutic multivitamin-minerals (THERAGRAN-M) Tab Take 1 Tab by mouth every day.      Ascorbic Acid (VITAMIN C CR) 1000 MG Tab CR Take 1 Tab by mouth every day.       No current facility-administered medications for this  visit.          Current supplements as per medication list.     Allergies: Bee, Nsaids, Codeine, and Keflex    Current social contact/activities: Not much      She  reports that she has been smoking cigarettes. She has a 25.00 pack-year smoking history. She has never used smokeless tobacco. She reports that she does not currently use alcohol after a past usage of about 0.6 oz per week. She reports that she does not use drugs.  Ready to quit: Not Answered  Counseling given: Not Answered      ROS:    Gait: Uses no assistive device  Ostomy: No  Other tubes: No  Amputations: No  Chronic oxygen use: No  Last eye exam: Not sure but past due   Wears hearing aids: No   : Denies any urinary leakage during the last 6 months    Screening:    Depression Screening  Little interest or pleasure in doing things?  0 - not at all  Feeling down, depressed , or hopeless? 0 - not at all  Trouble falling or staying asleep, or sleeping too much?     Feeling tired or having little energy?     Poor appetite or overeating?     Feeling bad about yourself - or that you are a failure or have let yourself or your family down?    Trouble concentrating on things, such as reading the newspaper or watching television?    Moving or speaking so slowly that other people could have noticed.  Or the opposite - being so fidgety or restless that you have been moving around a lot more than usual?     Thoughts that you would be better off dead, or of hurting yourself?     Patient Health Questionnaire Score:      If depressive symptoms identified deferred to follow up visit unless specifically addressed in assessment and plan.    Interpretation of PHQ-9 Total Score   Score Severity   1-4 No Depression   5-9 Mild Depression   10-14 Moderate Depression   15-19 Moderately Severe Depression   20-27 Severe Depression    Screening for Cognitive Impairment  Three Minute Recall (daughter, heaven, mountain) 0/3    [place] clock face with all 12 numbers and set the  hands to show 10 past 11.  No    Cognitive concerns identified deferred for follow up unless specifically addressed in assessment and plan.    Fall Risk Assessment  Has the patient had two or more falls in the last year or any fall with injury in the last year?       Safety Assessment  Throw rugs on floor.  Yes  Handrails on all stairs.  Yes  Good lighting in all hallways.  Yes  Difficulty hearing.  Yes  Patient counseled about all safety risks that were identified.    Functional Assessment ADLs  Are there any barriers preventing you from cooking for yourself or meeting nutritional needs?  No.    Are there any barriers preventing you from driving safely or obtaining transportation?  No.    Are there any barriers preventing you from using a telephone or calling for help?  No    Are there any barriers preventing you from shopping?  No.    Are there any barriers preventing you from taking care of your own finances?  No    Are there any barriers preventing you from managing your medications?  No    Are there any barriers preventing you from showering, bathing or dressing yourself? No    Are you currently engaging in any exercise or physical activity?  Yes.    What is your perception of your health? Fair    Advance Care Planning  Do you have an Advance Directive, Living Will, Durable Power of , or POLST? No                 Health Maintenance Summary            Overdue - HEPATITIS C SCREENING (Once) Overdue - never done      No completion history exists for this topic.              Overdue - COVID-19 Vaccine (1) Overdue - never done      No completion history exists for this topic.              Overdue - IMM DTaP/Tdap/Td Vaccine (1 - Tdap) Overdue - never done      No completion history exists for this topic.              Overdue - IMM ZOSTER VACCINES (2 of 3) Overdue since 10/1/2014      08/06/2014  Imm Admin: Zoster Vaccine Live (ZVL) (Zostavax) - HISTORICAL DATA              Overdue - IMM INFLUENZA (1) Overdue  since 9/1/2022 11/16/2020  Imm Admin: Influenza Vaccine Adult HD    10/23/2019  Imm Admin: Influenza Vaccine Adult HD    01/17/2019  Imm Admin: Influenza Vaccine Adult HD    01/16/2018  Imm Admin: Influenza Vaccine Adult HD    11/07/2016  Imm Admin: Influenza Vaccine Adult HD    Only the first 5 history entries have been loaded, but more history exists.              Overdue - MAMMOGRAM (Every 2 Years) Overdue since 12/18/2022 12/18/2020  MA-SCREENING MAMMO BILAT W/TOMOSYNTHESIS W/CAD    07/23/2018  MA-MAMMO DIAGNOSTIC BILAT W/CLAUDIA W/CAD    07/20/2015  MA-SCREEN MAMMO W/CAD-BILAT    05/19/2014  MA-SCREENING MAMMOGRAM W/ CAD    10/05/2009  MA-SCREENING DIGITAL MAMMO    Only the first 5 history entries have been loaded, but more history exists.              COLORECTAL CANCER SCREENING (COLONOSCOPY - Every 10 Years) Next due on 9/17/2023 07/16/2018  COLOGUARD COLON CANCER SCREENING    11/08/2016  OCCULT BLOOD FECES IMMUNOASSAY    08/09/2014  OCCULT BLOOD FECES IMMUNOASSAY    09/17/2013  COLONOSCOPY (Patient Declined)              Annual Wellness Visit (Every 366 Days) Next due on 3/19/2024      03/19/2023  Subsequent Annual Wellness Visit - Includes PPPS ()    03/10/2023  Visit Dx: Encounter for Medicare annual wellness exam    05/04/2022  Level of Service: [place] ANNUAL WELLNESS VISIT-INCLUDES PPPS SUBSEQUE*    11/16/2020  Subsequent Annual Wellness Visit - Includes PPPS ()    11/16/2020  Visit Dx: Medicare annual wellness visit, subsequent    Only the first 5 history entries have been loaded, but more history exists.              BONE DENSITY (Every 5 Years) Next due on 9/10/2026      09/10/2021  DS-BONE DENSITY STUDY (DEXA)    07/20/2015  DS-BONE DENSITY STUDY (DEXA)    06/22/2006  DS-BONE DENSITY STUDY (DEXA)    09/27/2004  DS-BONE DENSITY STUDY (DEXA)              IMM PNEUMOCOCCAL VACCINE: 65+ Years (Series Information) Completed      01/25/2016  Imm Admin: Pneumococcal Conjugate Vaccine  (Prevnar/PCV-13)    07/09/2013  Imm Admin: Pneumococcal polysaccharide vaccine (PPSV-23)              IMM MENINGOCOCCAL ACWY VACCINE (Series Information) Aged Out      No completion history exists for this topic.              Discontinued - IMM HEP B VACCINE  Discontinued      No completion history exists for this topic.                    Patient Care Team:  Cristin Gale M.D. as PCP - General (Internal Medicine)  ALETA Mahajan as PCP - [facility]  Carina Durbin M.D. as Consulting Physician (Surgery)  Janes Rivero M.D. as Medical Home Care Manager (Diagnostic Radiology)  Bret Stiles O.D. as Consulting Physician (Optometry)  Marjorie Pacheco (Inactive) as [facility]       Social History     Tobacco Use    Smoking status: Some Days     Packs/day: 0.50     Years: 50.00     Pack years: 25.00     Types: Cigarettes    Smokeless tobacco: Never   Vaping Use    Vaping Use: Never used   Substance Use Topics    Alcohol use: Not Currently     Alcohol/week: 0.6 oz     Types: 1 Glasses of wine per week    Drug use: No     Family History   Problem Relation Age of Onset    Other Sister         tremors / instability    Lung Disease Sister         COPD     Hyperlipidemia Sister     Other Paternal Grandfather         brain aneurysm     She  has a past medical history of Acute nasopharyngitis, Blood clotting disorder (HCC) (2016/2017), Contact dermatitis due to cosmetics (9/16/2015), Environmental and seasonal allergies, Guillain Barré syndrome (HCC), Hemorrhagic disorder ([facility]), History of kidney stones, History of seizures, Hyperlipidemia, Hypertension, Intracranial aneurysm, PAD (peripheral artery disease) (5/7/2014), Psychiatric problem, Reactive depression (9/17/2013), Renal disorder, and Seizure ([facility]) (1988).   Past Surgical History:   Procedure Laterality Date    THROMBECTOMY Right 5/25/2018    Procedure: FEMORAL ENDARTERECTOMY, ANGIOPLASTY WITH PATCH, FOREIGN BODY  "REMOVAL;  Surgeon: Carina Durbin M.D.;  Location: SURGERY [facility];  Service: General    RECOVERY  10/4/2013    Performed by [facility] at SURGERY SAME DAY [facility] ORS    FEMORAL POPLITEAL BYPASS  7/7/2013    Performed by Carina Durbin M.D. at SURGERY [facility]    RECOVERY  7/6/2013    Performed by [facility] at SURGERY [facility]    ABDOMINAL HYSTERECTOMY TOTAL  1981    w/o BSO due to fibroids    APPENDECTOMY      during hysterectomy    ORIF, WRIST      OTHER      neuroma removed from feet 4-5x    OTHER      abdominal stent    TONSILLECTOMY         Exam:   /56 (BP Location: Right arm, Patient Position: Sitting, BP Cuff Size: Adult)   Pulse 90   Temp 37.3 °C (99.2 °F) (Temporal)   Ht 1.549 m (5' 1\")   Wt 44.7 kg (98 lb 9.6 oz)   SpO2 97%  Body mass index is 18.63 kg/m².    Hearing fair.    Dentition good  Alert, oriented in no acute distress.  Eye contact is good, speech goal directed, affect calm    Assessment and Plan. The following treatment and monitoring plan is recommended:      Encounter for Medicare annual wellness exam  - Subsequent Annual Wellness Visit - Includes PPPS ()    Moderate late onset Alzheimer's dementia without behavioral disturbance, psychotic disturbance, mood disturbance, or anxiety (HCC)  Moderate protein-calorie malnutrition (HCC)  Moderate episode of recurrent major depressive disorder (HCC)  Chronic medical conditions.  The patient is managed by neurology, Dr. Salvador. MRI of the brain revealed nonspecific vascular changes and atrophy.  Brain PET CT revealed selective hypometabolism in the temporal and right parietal lobes consistent with possible Alzheimer's disease.  The patient and her  do a great deal of research regarding nutritional and exercise modifications that can contribute to slowing down the progression of dementia.  She follows a very healthy diet.  She takes paroxetine for episodic agitation related to her " Alzheimer's dementia.  -Continue paroxetine 10 mg daily   - VITAMIN D,25 HYDROXY (DEFICIENCY); Future  - VITAMIN B12; Future  - CRP QUANTITIVE (NON-CARDIAC); Future  - Sed Rate; Future    Disorder of arteries and arterioles (HCC)  PAD (peripheral artery disease) (HCC)  Aneurysm of middle cerebral artery  Aneurysm of left internal carotid artery  Dyslipidemia  Chronic medical conditions.  The patient is managed by vascular surgery, Dr. Durbin.  She is status post coiling x3 for cerebral aneurysms.  She takes clopidogrel 75 mg daily, but declines statin medications as she feels cholesterol benefits brain function and that is an issue that is more important to her at this time.  Lipid panel on 9/20/2021 showed a total cholesterol 317, , HDL 52, triglycerides 106. The 10-year ASCVD risk score (Kuldip TAPIA, et al., 2019) is: 18.7%  -Continue clopidogrel 75 mg daily stable.     - Comp Metabolic Panel; Future  - Lipid Profile; Future    Encounter for screening for diabetes mellitus  - Comp Metabolic Panel; Future    Screening for deficiency anemia  - CBC WITH DIFFERENTIAL; Future    Thyroid disorder screening  - TSH WITH REFLEX TO FT4; Future      Services suggested: No services needed at this time  Health Care Screening: Age-appropriate preventive services recommended by USPTF and ACIP covered by Medicare were discussed today. Services ordered if indicated and agreed upon by the patient.  Referrals offered: Community-based lifestyle interventions to reduce health risks and promote self-management and wellness, fall prevention, nutrition, physical activity, tobacco-use cessation, weight loss, and mental health services as per orders if indicated.    Discussion today about general wellness and lifestyle habits:    Prevent falls and reduce trip hazards; Cautioned about securing or removing rugs.  Have a working fire alarm and carbon monoxide detector;   Engage in regular physical activity and social activities      Follow-up: Return in about 6 months (around 9/10/2023) for 6-month f/u visit.    Please note that this dictation was created using voice recognition software. I have made every reasonable attempt to correct obvious errors, but I expect that there are errors of grammar and possibly content that I did not discover before finalizing the note.

## 2023-03-19 PROBLEM — I67.1 ANEURYSM OF INTERNAL CAROTID ARTERY: Status: RESOLVED | Noted: 2018-05-26 | Resolved: 2023-03-19

## 2023-04-27 ENCOUNTER — HOSPITAL ENCOUNTER (OUTPATIENT)
Dept: LAB | Facility: MEDICAL CENTER | Age: 73
End: 2023-04-27
Attending: INTERNAL MEDICINE
Payer: MEDICARE

## 2023-04-27 DIAGNOSIS — Z13.1 ENCOUNTER FOR SCREENING FOR DIABETES MELLITUS: ICD-10-CM

## 2023-04-27 DIAGNOSIS — Z13.29 THYROID DISORDER SCREENING: ICD-10-CM

## 2023-04-27 DIAGNOSIS — E78.5 DYSLIPIDEMIA: ICD-10-CM

## 2023-04-27 DIAGNOSIS — G30.1 MODERATE LATE ONSET ALZHEIMER'S DEMENTIA WITHOUT BEHAVIORAL DISTURBANCE, PSYCHOTIC DISTURBANCE, MOOD DISTURBANCE, OR ANXIETY (HCC): Chronic | ICD-10-CM

## 2023-04-27 DIAGNOSIS — Z13.0 SCREENING FOR DEFICIENCY ANEMIA: ICD-10-CM

## 2023-04-27 DIAGNOSIS — F02.B0 MODERATE LATE ONSET ALZHEIMER'S DEMENTIA WITHOUT BEHAVIORAL DISTURBANCE, PSYCHOTIC DISTURBANCE, MOOD DISTURBANCE, OR ANXIETY (HCC): Chronic | ICD-10-CM

## 2023-04-27 LAB
25(OH)D3 SERPL-MCNC: 27 NG/ML (ref 30–100)
ALBUMIN SERPL BCP-MCNC: 4.4 G/DL (ref 3.2–4.9)
ALBUMIN/GLOB SERPL: 1.7 G/DL
ALP SERPL-CCNC: 106 U/L (ref 30–99)
ALT SERPL-CCNC: 12 U/L (ref 2–50)
ANION GAP SERPL CALC-SCNC: 10 MMOL/L (ref 7–16)
AST SERPL-CCNC: 23 U/L (ref 12–45)
BASOPHILS # BLD AUTO: 0.4 % (ref 0–1.8)
BASOPHILS # BLD: 0.04 K/UL (ref 0–0.12)
BILIRUB SERPL-MCNC: 0.6 MG/DL (ref 0.1–1.5)
BUN SERPL-MCNC: 10 MG/DL (ref 8–22)
CALCIUM ALBUM COR SERPL-MCNC: 9.1 MG/DL (ref 8.5–10.5)
CALCIUM SERPL-MCNC: 9.4 MG/DL (ref 8.5–10.5)
CHLORIDE SERPL-SCNC: 106 MMOL/L (ref 96–112)
CHOLEST SERPL-MCNC: 373 MG/DL (ref 100–199)
CO2 SERPL-SCNC: 23 MMOL/L (ref 20–33)
CREAT SERPL-MCNC: 0.58 MG/DL (ref 0.5–1.4)
CRP SERPL HS-MCNC: <0.3 MG/DL (ref 0–0.75)
EOSINOPHIL # BLD AUTO: 0.04 K/UL (ref 0–0.51)
EOSINOPHIL NFR BLD: 0.4 % (ref 0–6.9)
ERYTHROCYTE [DISTWIDTH] IN BLOOD BY AUTOMATED COUNT: 48.2 FL (ref 35.9–50)
ERYTHROCYTE [SEDIMENTATION RATE] IN BLOOD BY WESTERGREN METHOD: 5 MM/HOUR (ref 0–25)
GFR SERPLBLD CREATININE-BSD FMLA CKD-EPI: 94 ML/MIN/1.73 M 2
GLOBULIN SER CALC-MCNC: 2.6 G/DL (ref 1.9–3.5)
GLUCOSE SERPL-MCNC: 112 MG/DL (ref 65–99)
HCT VFR BLD AUTO: 43.8 % (ref 37–47)
HDLC SERPL-MCNC: 56 MG/DL
HGB BLD-MCNC: 14.6 G/DL (ref 12–16)
IMM GRANULOCYTES # BLD AUTO: 0.05 K/UL (ref 0–0.11)
IMM GRANULOCYTES NFR BLD AUTO: 0.5 % (ref 0–0.9)
LDLC SERPL CALC-MCNC: 266 MG/DL
LYMPHOCYTES # BLD AUTO: 2.32 K/UL (ref 1–4.8)
LYMPHOCYTES NFR BLD: 22.5 % (ref 22–41)
MCH RBC QN AUTO: 31.7 PG (ref 27–33)
MCHC RBC AUTO-ENTMCNC: 33.3 G/DL (ref 33.6–35)
MCV RBC AUTO: 95 FL (ref 81.4–97.8)
MONOCYTES # BLD AUTO: 0.42 K/UL (ref 0–0.85)
MONOCYTES NFR BLD AUTO: 4.1 % (ref 0–13.4)
NEUTROPHILS # BLD AUTO: 7.42 K/UL (ref 2–7.15)
NEUTROPHILS NFR BLD: 72.1 % (ref 44–72)
NRBC # BLD AUTO: 0 K/UL
NRBC BLD-RTO: 0 /100 WBC
PLATELET # BLD AUTO: 300 K/UL (ref 164–446)
PMV BLD AUTO: 10.1 FL (ref 9–12.9)
POTASSIUM SERPL-SCNC: 4.9 MMOL/L (ref 3.6–5.5)
PROT SERPL-MCNC: 7 G/DL (ref 6–8.2)
RBC # BLD AUTO: 4.61 M/UL (ref 4.2–5.4)
SODIUM SERPL-SCNC: 139 MMOL/L (ref 135–145)
TRIGL SERPL-MCNC: 253 MG/DL (ref 0–149)
TSH SERPL DL<=0.005 MIU/L-ACNC: 1.23 UIU/ML (ref 0.38–5.33)
VIT B12 SERPL-MCNC: 883 PG/ML (ref 211–911)
WBC # BLD AUTO: 10.3 K/UL (ref 4.8–10.8)

## 2023-04-27 PROCEDURE — 36415 COLL VENOUS BLD VENIPUNCTURE: CPT

## 2023-04-27 PROCEDURE — 80053 COMPREHEN METABOLIC PANEL: CPT

## 2023-04-27 PROCEDURE — 86140 C-REACTIVE PROTEIN: CPT

## 2023-04-27 PROCEDURE — 82306 VITAMIN D 25 HYDROXY: CPT

## 2023-04-27 PROCEDURE — 84443 ASSAY THYROID STIM HORMONE: CPT

## 2023-04-27 PROCEDURE — 85652 RBC SED RATE AUTOMATED: CPT

## 2023-04-27 PROCEDURE — 85025 COMPLETE CBC W/AUTO DIFF WBC: CPT

## 2023-04-27 PROCEDURE — 80061 LIPID PANEL: CPT

## 2023-04-27 PROCEDURE — 82607 VITAMIN B-12: CPT

## 2023-05-16 ENCOUNTER — HOSPITAL ENCOUNTER (OUTPATIENT)
Dept: RADIOLOGY | Facility: MEDICAL CENTER | Age: 73
End: 2023-05-16
Attending: INTERNAL MEDICINE
Payer: MEDICARE

## 2023-05-16 DIAGNOSIS — Z12.31 VISIT FOR SCREENING MAMMOGRAM: ICD-10-CM

## 2023-05-16 PROCEDURE — 77063 BREAST TOMOSYNTHESIS BI: CPT

## 2023-07-06 ENCOUNTER — DOCUMENTATION (OUTPATIENT)
Dept: HEALTH INFORMATION MANAGEMENT | Facility: OTHER | Age: 73
End: 2023-07-06
Payer: MEDICARE

## 2023-07-06 NOTE — PROGRESS NOTES
Colorectal Cancer Screening/ Next 9/2023    Comprehensive Health Assessment: was scheduled by [facility].

## 2024-01-19 ENCOUNTER — TELEPHONE (OUTPATIENT)
Dept: MEDICAL GROUP | Facility: PHYSICIAN GROUP | Age: 74
End: 2024-01-19
Payer: MEDICARE

## 2024-01-19 ENCOUNTER — OFFICE VISIT (OUTPATIENT)
Dept: MEDICAL GROUP | Age: 74
End: 2024-01-19
Payer: MEDICARE

## 2024-01-19 VITALS
WEIGHT: 94 LBS | HEART RATE: 90 BPM | DIASTOLIC BLOOD PRESSURE: 60 MMHG | BODY MASS INDEX: 17.75 KG/M2 | RESPIRATION RATE: 16 BRPM | HEIGHT: 61 IN | TEMPERATURE: 98.5 F | SYSTOLIC BLOOD PRESSURE: 120 MMHG | OXYGEN SATURATION: 98 %

## 2024-01-19 DIAGNOSIS — Z01.818 PREOPERATIVE CLEARANCE: ICD-10-CM

## 2024-01-19 PROCEDURE — 99213 OFFICE O/P EST LOW 20 MIN: CPT | Performed by: STUDENT IN AN ORGANIZED HEALTH CARE EDUCATION/TRAINING PROGRAM

## 2024-01-19 PROCEDURE — 3074F SYST BP LT 130 MM HG: CPT | Performed by: STUDENT IN AN ORGANIZED HEALTH CARE EDUCATION/TRAINING PROGRAM

## 2024-01-19 PROCEDURE — 3078F DIAST BP <80 MM HG: CPT | Performed by: STUDENT IN AN ORGANIZED HEALTH CARE EDUCATION/TRAINING PROGRAM

## 2024-01-19 ASSESSMENT — ENCOUNTER SYMPTOMS
WEAKNESS: 0
DIZZINESS: 0
PALPITATIONS: 0
PHOTOPHOBIA: 0
DEPRESSION: 0
NECK PAIN: 0
FEVER: 0
ORTHOPNEA: 0
SHORTNESS OF BREATH: 0
DOUBLE VISION: 0
SENSORY CHANGE: 0
BLOOD IN STOOL: 0
WHEEZING: 0
HEARTBURN: 0
MYALGIAS: 0
ABDOMINAL PAIN: 0
HEADACHES: 0
BLURRED VISION: 0
SINUS PAIN: 0
CHILLS: 0

## 2024-01-19 ASSESSMENT — PATIENT HEALTH QUESTIONNAIRE - PHQ9
SUM OF ALL RESPONSES TO PHQ QUESTIONS 1-9: 8
CLINICAL INTERPRETATION OF PHQ2 SCORE: 2
5. POOR APPETITE OR OVEREATING: 1 - SEVERAL DAYS

## 2024-01-19 ASSESSMENT — FIBROSIS 4 INDEX: FIB4 SCORE: 1.659882023920174073

## 2024-01-19 NOTE — PROGRESS NOTES
"Subjective:     CC: Clearance for procedure.    HPI:   Reina presents today requesting clearance to undergo dental procedure.  Patient states that she has been following up with a Dentist and they are planning to schedule a dental extraction sometime soon, she is currently on Oral Abx.  Patient currently taking plavix after she underwent coiling and stent placement many years ago for brain aneurysm.  I suggested that Plavix should be continue for procedure since is not a high risk surgery but if Dentist does not feel safe due to significantly bleeding it might be stopped for a short period of time 3-5 days prior procedure and resume right after procedure is done.      ROS:  Review of Systems   Constitutional:  Negative for chills, fever and malaise/fatigue.   HENT:  Negative for nosebleeds, sinus pain and tinnitus.    Eyes:  Negative for blurred vision, double vision and photophobia.   Respiratory:  Negative for shortness of breath and wheezing.    Cardiovascular:  Negative for chest pain, palpitations and orthopnea.   Gastrointestinal:  Negative for abdominal pain, blood in stool, heartburn and melena.   Genitourinary:  Negative for dysuria, frequency and urgency.   Musculoskeletal:  Negative for myalgias and neck pain.   Neurological:  Negative for dizziness, sensory change, weakness and headaches.   Psychiatric/Behavioral:  Negative for depression and suicidal ideas.        Objective:     Exam:  /60 (BP Location: Right arm, Patient Position: Sitting, BP Cuff Size: Adult)   Pulse 90   Temp 36.9 °C (98.5 °F) (Temporal)   Resp 16   Ht 1.549 m (5' 1\")   Wt 42.6 kg (94 lb)   LMP  (LMP Unknown)   SpO2 98%   BMI 17.76 kg/m²  Body mass index is 17.76 kg/m².    Physical Exam  Constitutional:       General: She is not in acute distress.  HENT:      Head: Normocephalic and atraumatic.      Right Ear: Tympanic membrane normal.      Left Ear: Tympanic membrane normal.      Mouth/Throat:      Mouth: Mucous " membranes are moist.   Eyes:      General: No scleral icterus.     Extraocular Movements: Extraocular movements intact.      Pupils: Pupils are equal, round, and reactive to light.   Cardiovascular:      Rate and Rhythm: Normal rate and regular rhythm.      Pulses: Normal pulses.      Heart sounds: Normal heart sounds. No murmur heard.  Pulmonary:      Effort: Pulmonary effort is normal. No respiratory distress.      Breath sounds: Normal breath sounds. No wheezing.   Abdominal:      General: There is no distension.      Palpations: Abdomen is soft.      Tenderness: There is no abdominal tenderness. There is no guarding or rebound.   Musculoskeletal:      Cervical back: Normal range of motion and neck supple.      Right lower leg: No edema.      Left lower leg: No edema.   Skin:     Capillary Refill: Capillary refill takes less than 2 seconds.   Neurological:      General: No focal deficit present.      Mental Status: She is alert.      Cranial Nerves: No cranial nerve deficit.      Sensory: No sensory deficit.   Psychiatric:         Mood and Affect: Mood normal.         A chaperone was offered to the patient during today's exam.: Patient declined.    Labs:     Assessment & Plan:     73 y.o. female with the following -     1. Preoperative clearance  - Patient's Dentist requested clearance for Tooth extraction  - She does not have HF of CAD  - Had coiling of aneurysm many years ago on Plavix  - Given low bleeding risk surgery she can continue medication unless Surgeon asks to stop it  and might be stopped for 3-5 days   - Letter given to patient to take to Dentist office  - If concerns or questions call office    HCC Gap Form    Last edited 01/19/24 13:42 PST by Holland Garrett M.D.           Return if symptoms worsen or fail to improve.    Please note that this dictation was created using voice recognition software. I have made every reasonable attempt to correct obvious errors, but I expect that there are errors  of grammar and possibly content that I did not discover before finalizing the note.

## 2024-01-19 NOTE — LETTER
January 19, 2024    To Whom It May Concern:         This is confirmation that Reina Gandara attended her scheduled appointment with Holland Garrett M.D. on 1/19/24.    Mrs. Gandara came today for clearance for Dental extraction. She has been on Plavix for years after she had stent placement and coiling of Aneurysm she needs to be on this medication indefinitely and may be stopped for a short period of time only for high risk surgeries.          If you have any questions please do not hesitate to call me at the phone number listed below.    Sincerely,          Holland Garrett M.D.  [phone]

## 2024-01-19 NOTE — PATIENT INSTRUCTIONS
- Continue home meds  - OK to undergo dental procedure  - Plavix may be stopped for short period of time only if absolutely necessary  - Dental extraction might not be the case  - PCP follow up as needed

## 2024-01-26 ENCOUNTER — TELEPHONE (OUTPATIENT)
Dept: MEDICAL GROUP | Facility: PHYSICIAN GROUP | Age: 74
End: 2024-01-26
Payer: MEDICARE

## 2024-01-26 NOTE — TELEPHONE ENCOUNTER
Patients  called and he wanted some pain medication for his wife since she is having a lot of pain due to the tooth extraction. I let him know that he needs to contact the dentist because they preformed that procedure we can not prescribe that.

## 2024-02-23 NOTE — PROGRESS NOTES
Pt contacted by OP MRI  regarding surveillance study for intracranial aneurysms. Expressed wishes to speak w/treating [facility] physician. Dr. Rivero contacted the pt/spouse today. They have concerns that pt has been diagnosed with dementia and that it was exacerbated by last MRA. MD discussed MRA and safety and reason behind aneurysm surveillance. All questions answered.

## 2024-04-23 ENCOUNTER — DOCUMENTATION (OUTPATIENT)
Dept: HEALTH INFORMATION MANAGEMENT | Facility: OTHER | Age: 74
End: 2024-04-23
Payer: MEDICARE

## 2024-05-01 NOTE — TELEPHONE ENCOUNTER
Received request via: Pharmacy    Was the patient seen in the last year in this department? No    Does the patient have an active prescription (recently filled or refills available) for medication(s) requested? No    Pharmacy Name: [facility] [address]    Does the patient have snf Plus and need 100 day supply (blood pressure, diabetes and cholesterol meds only)? Medication is not for cholesterol, blood pressure or diabetes

## 2024-05-04 RX ORDER — PAROXETINE 10 MG/1
TABLET, FILM COATED ORAL
Qty: 100 TABLET | Refills: 0 | Status: SHIPPED | OUTPATIENT
Start: 2024-05-04

## 2024-08-06 ENCOUNTER — OFFICE VISIT (OUTPATIENT)
Dept: MEDICAL GROUP | Facility: PHYSICIAN GROUP | Age: 74
End: 2024-08-06
Payer: MEDICARE

## 2024-08-06 VITALS
HEART RATE: 75 BPM | WEIGHT: 94 LBS | OXYGEN SATURATION: 96 % | HEIGHT: 62 IN | BODY MASS INDEX: 17.3 KG/M2 | DIASTOLIC BLOOD PRESSURE: 64 MMHG | TEMPERATURE: 98.5 F | SYSTOLIC BLOOD PRESSURE: 110 MMHG

## 2024-08-06 DIAGNOSIS — I73.9 PAD (PERIPHERAL ARTERY DISEASE) (HCC): ICD-10-CM

## 2024-08-06 DIAGNOSIS — Z13.21 ENCOUNTER FOR VITAMIN DEFICIENCY SCREENING: ICD-10-CM

## 2024-08-06 DIAGNOSIS — F33.1 MODERATE EPISODE OF RECURRENT MAJOR DEPRESSIVE DISORDER (HCC): ICD-10-CM

## 2024-08-06 DIAGNOSIS — E55.9 VITAMIN D DEFICIENCY: ICD-10-CM

## 2024-08-06 DIAGNOSIS — G30.1 MODERATE LATE ONSET ALZHEIMER'S DEMENTIA WITHOUT BEHAVIORAL DISTURBANCE, PSYCHOTIC DISTURBANCE, MOOD DISTURBANCE, OR ANXIETY (HCC): Chronic | ICD-10-CM

## 2024-08-06 DIAGNOSIS — I67.1 ANEURYSM OF LEFT INTERNAL CAROTID ARTERY: ICD-10-CM

## 2024-08-06 DIAGNOSIS — Z13.29 THYROID DISORDER SCREEN: ICD-10-CM

## 2024-08-06 DIAGNOSIS — I77.9 DISORDER OF ARTERIES AND ARTERIOLES (HCC): ICD-10-CM

## 2024-08-06 DIAGNOSIS — Z13.1 ENCOUNTER FOR SCREENING FOR DIABETES MELLITUS: ICD-10-CM

## 2024-08-06 DIAGNOSIS — E78.5 DYSLIPIDEMIA: ICD-10-CM

## 2024-08-06 DIAGNOSIS — Z13.0 SCREENING FOR DEFICIENCY ANEMIA: ICD-10-CM

## 2024-08-06 DIAGNOSIS — I67.1 ANEURYSM OF MIDDLE CEREBRAL ARTERY: ICD-10-CM

## 2024-08-06 DIAGNOSIS — F02.B0 MODERATE LATE ONSET ALZHEIMER'S DEMENTIA WITHOUT BEHAVIORAL DISTURBANCE, PSYCHOTIC DISTURBANCE, MOOD DISTURBANCE, OR ANXIETY (HCC): Chronic | ICD-10-CM

## 2024-08-06 PROCEDURE — 99214 OFFICE O/P EST MOD 30 MIN: CPT | Performed by: INTERNAL MEDICINE

## 2024-08-06 PROCEDURE — 3078F DIAST BP <80 MM HG: CPT | Performed by: INTERNAL MEDICINE

## 2024-08-06 PROCEDURE — 3074F SYST BP LT 130 MM HG: CPT | Performed by: INTERNAL MEDICINE

## 2024-08-06 RX ORDER — CLOPIDOGREL BISULFATE 75 MG/1
75 TABLET ORAL
Qty: 100 TABLET | Refills: 3 | Status: SHIPPED | OUTPATIENT
Start: 2024-08-06

## 2024-08-06 RX ORDER — PAROXETINE 10 MG/1
10 TABLET, FILM COATED ORAL
Qty: 100 TABLET | Refills: 3 | Status: SHIPPED | OUTPATIENT
Start: 2024-08-06

## 2024-08-06 ASSESSMENT — FIBROSIS 4 INDEX: FIB4 SCORE: 1.68

## 2024-08-06 NOTE — PROGRESS NOTES
Verbal consent was acquired by the patient to use Smart Cube ambient listening note generation during this visit Yes     Subjective:     CC:   Chief Complaint   Patient presents with    Medication Refill     PARoxetine (PAXIL) 10 MG Tab  - wants to see if there is an alternative because she is getting a burning sensation when she take it.             HPI:   History of Present Illness  Reina Gandara is a 74-year-old female who presents for follow-up medical visit.  She is accompanied by her .  They agree that there has been some mild progression of her Alzheimer's. She primarily struggles with short-term memory, but her long-term memory remains great. She is currently taking vitamin E, aspirin, vitamin D, and Omega-3 supplements.She experiences a burning sensation in her throat after swallowing the paroxetine, which she takes at night. This sensation has been ongoing for a couple of months. The burning sensation does not last more than an hour if she drinks her usual amount of water.  There is uncertainty about whether or not she is taking her Plavix consistently.  She is experiencing depression and is seeking a referral to a therapist.      Past Medical History:   Diagnosis Date    Acute nasopharyngitis     cold 2/1/20    Blood clotting disorder (HCC) 2016/2017    DVT    Contact dermatitis due to cosmetics 9/16/2015    Environmental and seasonal allergies     Guillain Barré syndrome (HCC)     Hx     Hemorrhagic disorder (HCC)     plavix    History of kidney stones     x2 episodes    History of seizures     x2 episodes - no treatment needed    Hyperlipidemia     Hypertension     Intracranial aneurysm     PAD (peripheral artery disease) 5/7/2014    Psychiatric problem     depression    Reactive depression 9/17/2013    Renal disorder     stones    Seizure (HCC) 1988    unknown cause       Social History     Tobacco Use    Smoking status: Some Days     Current packs/day: 0.50     Average packs/day:  "0.5 packs/day for 50.0 years (25.0 ttl pk-yrs)     Types: Cigarettes    Smokeless tobacco: Never   Vaping Use    Vaping status: Never Used   Substance Use Topics    Alcohol use: Not Currently     Alcohol/week: 0.6 oz     Types: 1 Glasses of wine per week    Drug use: No       Current Outpatient Medications Ordered in Epic   Medication Sig Dispense Refill    clopidogrel (PLAVIX) 75 MG Tab Take 1 Tablet by mouth every day. 100 Tablet 3    PARoxetine (PAXIL) 10 MG Tab Take 1 Tablet by mouth every day. with food 100 Tablet 3    Omega-3 Fatty Acids (FISH OIL) 600 MG Cap Take  by mouth.      vitamin e (VITAMIN E) 400 UNIT Cap Take 400 Units by mouth every day.      cholecalciferol (D-3-5) 5000 UNIT Cap Take 5,000 Units by mouth every day.      B Complex Vitamins (B COMPLEX B-12 PO) Take  by mouth.      therapeutic multivitamin-minerals (THERAGRAN-M) Tab Take 1 Tab by mouth every day.      Ascorbic Acid (VITAMIN C CR) 1000 MG Tab CR Take 1 Tab by mouth every day.       No current [facility]-ordered facility-administered medications on file.       Allergies:  Bee, Nsaids, Codeine, and Keflex    Health Maintenance: Completed    Review of Systems:  No fevers or chills. No cough, chest pain, or shortness of breath.       Objective:       Exam:  /64   Pulse 75   Temp 36.9 °C (98.5 °F) (Temporal)   Ht 1.575 m (5' 2\")   Wt 42.6 kg (94 lb)   LMP  (LMP Unknown)   SpO2 96%   BMI 17.19 kg/m²  Body mass index is 17.19 kg/m².    Gen: Alert and oriented, No apparent distress.  ENT: Oropharynx without erythema  Lungs: Normal effort, CTA bilaterally, no wheezes, rhonchi, or rales  CV: Regular rate and rhythm. No murmurs, rubs, or gallops.  Ext: No clubbing, cyanosis, edema.      Assessment & Plan:     74 y.o. female with the following -     Moderate late onset Alzheimer's dementia without behavioral disturbance, psychotic disturbance, mood disturbance, or anxiety (HCC)  Moderate episode of recurrent major depressive disorder " ([facility])  Chronic medical conditions, slightly progressive.  The patient is managed by neurology, Dr. Salvador. MRI of the brain revealed nonspecific vascular changes and atrophy.  Brain PET CT revealed selective hypometabolism in the temporal and right parietal lobes consistent with possible Alzheimer's disease.  She follows a healthy diet and gets regular exercise to help slow the progression of her disease.  She takes paroxetine for episodic agitation related to her Alzheimer's dementia.  She does report a burning sensation when swallowing the paroxetine so the  is not aware of this.  Advised her to consume the medication in yogurt or pudding.  She is also interested in a referral for worsening depression as well as any additional information regarding resources that may be available for her Alzheimer's.  Will refer to Dr. Staton for pain.  He may have some suggestions.  -Continue current regimen of paroxetine 10 mg daily   - PARoxetine (PAXIL) 10 MG Tab; Take 1 Tablet by mouth every day. with food  Dispense: 100 Tablet; Refill: 3  - Referral for Psychological Testing    PAD (peripheral artery disease) ([facility])  Disorder of arteries and arterioles ([facility])  Aneurysm of middle cerebral artery  Aneurysm of left internal carotid artery  Dyslipidemia  Chronic medical conditions, stable.  A1c 7.  Recheck.  The patient is managed by vascular surgery, Dr. Durbin.  She is status post coiling x3 for cerebral aneurysms.  She takes clopidogrel 75 mg daily, but declines statin medications as she feels cholesterol benefits brain function and that is an issue that is more important to her at this time.  It is also unclear whether she has been consistently taking her Plavix recently.  Lipid panel on 4/27/2023 showed a total cholesterol of 373, , HDL 56, triglycerides 263.  The ASCVD Risk score (Kuldip DK, et al., 2019) failed to calculate.  -Continue current regimen of clopidogrel 75 mg daily, refill provided at today's  visit, the patient continues to decline statin medications  - clopidogrel (PLAVIX) 75 MG Tab; Take 1 Tablet by mouth every day.  Dispense: 100 Tablet; Refill: 3  - Lipid Profile; Future    Vitamin D deficiency  - VITAMIN D,25 HYDROXY (DEFICIENCY); Future    Screening for deficiency anemia  - CBC WITH DIFFERENTIAL; Future    Encounter for screening for diabetes mellitus  - Comp Metabolic Panel; Future    Encounter for vitamin deficiency screening  - VITAMIN B12; Future    Thyroid disorder screen  - TSH WITH REFLEX TO FT4; Future    Return in about 6 months (around 2/6/2025) for 6-month f/u visit.    Please note that this dictation was created using voice recognition software. I have made every reasonable attempt to correct obvious errors, but I expect that there are errors of grammar and possibly content that I did not discover before finalizing the note.

## 2024-08-18 ENCOUNTER — APPOINTMENT (OUTPATIENT)
Dept: RADIOLOGY | Facility: MEDICAL CENTER | Age: 74
DRG: 065 | End: 2024-08-18
Attending: EMERGENCY MEDICINE
Payer: MEDICARE

## 2024-08-18 ENCOUNTER — APPOINTMENT (OUTPATIENT)
Dept: RADIOLOGY | Facility: MEDICAL CENTER | Age: 74
DRG: 065 | End: 2024-08-18
Attending: NURSE PRACTITIONER
Payer: MEDICARE

## 2024-08-18 ENCOUNTER — HOSPITAL ENCOUNTER (INPATIENT)
Facility: MEDICAL CENTER | Age: 74
LOS: 1 days | DRG: 065 | End: 2024-08-19
Attending: EMERGENCY MEDICINE | Admitting: INTERNAL MEDICINE
Payer: MEDICARE

## 2024-08-18 DIAGNOSIS — Z91.89 AT HIGH RISK FOR MALNUTRITION: ICD-10-CM

## 2024-08-18 DIAGNOSIS — R20.0 LEFT UPPER EXTREMITY NUMBNESS: ICD-10-CM

## 2024-08-18 DIAGNOSIS — I63.9 CEREBROVASCULAR ACCIDENT (CVA), UNSPECIFIED MECHANISM (HCC): ICD-10-CM

## 2024-08-18 DIAGNOSIS — I65.22 INTERNAL CAROTID ARTERY STENOSIS, LEFT: ICD-10-CM

## 2024-08-18 DIAGNOSIS — R53.1 LEFT-SIDED WEAKNESS: ICD-10-CM

## 2024-08-18 LAB
ABO + RH BLD: NORMAL
ABO GROUP BLD: NORMAL
ALBUMIN SERPL BCP-MCNC: 4 G/DL (ref 3.2–4.9)
ALBUMIN/GLOB SERPL: 1.6 G/DL
ALP SERPL-CCNC: 148 U/L (ref 30–99)
ALT SERPL-CCNC: 10 U/L (ref 2–50)
ANION GAP SERPL CALC-SCNC: 12 MMOL/L (ref 7–16)
APTT PPP: 31.1 SEC (ref 24.7–36)
AST SERPL-CCNC: 16 U/L (ref 12–45)
BASOPHILS # BLD AUTO: 0.5 % (ref 0–1.8)
BASOPHILS # BLD: 0.04 K/UL (ref 0–0.12)
BILIRUB SERPL-MCNC: 0.5 MG/DL (ref 0.1–1.5)
BLD GP AB SCN SERPL QL: NORMAL
BUN SERPL-MCNC: 12 MG/DL (ref 8–22)
CALCIUM ALBUM COR SERPL-MCNC: 8.8 MG/DL (ref 8.5–10.5)
CALCIUM SERPL-MCNC: 8.8 MG/DL (ref 8.5–10.5)
CHLORIDE SERPL-SCNC: 107 MMOL/L (ref 96–112)
CO2 SERPL-SCNC: 21 MMOL/L (ref 20–33)
CREAT SERPL-MCNC: 0.46 MG/DL (ref 0.5–1.4)
EKG IMPRESSION: NORMAL
EOSINOPHIL # BLD AUTO: 0.08 K/UL (ref 0–0.51)
EOSINOPHIL NFR BLD: 0.9 % (ref 0–6.9)
ERYTHROCYTE [DISTWIDTH] IN BLOOD BY AUTOMATED COUNT: 49.4 FL (ref 35.9–50)
GFR SERPLBLD CREATININE-BSD FMLA CKD-EPI: 99 ML/MIN/1.73 M 2
GLOBULIN SER CALC-MCNC: 2.5 G/DL (ref 1.9–3.5)
GLUCOSE SERPL-MCNC: 92 MG/DL (ref 65–99)
HCT VFR BLD AUTO: 40.7 % (ref 37–47)
HGB BLD-MCNC: 13.3 G/DL (ref 12–16)
IMM GRANULOCYTES # BLD AUTO: 0.02 K/UL (ref 0–0.11)
IMM GRANULOCYTES NFR BLD AUTO: 0.2 % (ref 0–0.9)
INR PPP: 1.01 (ref 0.87–1.13)
LYMPHOCYTES # BLD AUTO: 1.77 K/UL (ref 1–4.8)
LYMPHOCYTES NFR BLD: 21 % (ref 22–41)
MCH RBC QN AUTO: 31 PG (ref 27–33)
MCHC RBC AUTO-ENTMCNC: 32.7 G/DL (ref 32.2–35.5)
MCV RBC AUTO: 94.9 FL (ref 81.4–97.8)
MONOCYTES # BLD AUTO: 0.53 K/UL (ref 0–0.85)
MONOCYTES NFR BLD AUTO: 6.3 % (ref 0–13.4)
NEUTROPHILS # BLD AUTO: 6 K/UL (ref 1.82–7.42)
NEUTROPHILS NFR BLD: 71.1 % (ref 44–72)
NRBC # BLD AUTO: 0 K/UL
NRBC BLD-RTO: 0 /100 WBC (ref 0–0.2)
PLATELET # BLD AUTO: 287 K/UL (ref 164–446)
PMV BLD AUTO: 9.3 FL (ref 9–12.9)
POTASSIUM SERPL-SCNC: 4.3 MMOL/L (ref 3.6–5.5)
PROT SERPL-MCNC: 6.5 G/DL (ref 6–8.2)
PROTHROMBIN TIME: 13.4 SEC (ref 12–14.6)
RBC # BLD AUTO: 4.29 M/UL (ref 4.2–5.4)
RH BLD: NORMAL
SODIUM SERPL-SCNC: 140 MMOL/L (ref 135–145)
TROPONIN T SERPL-MCNC: <6 NG/L (ref 6–19)
WBC # BLD AUTO: 8.4 K/UL (ref 4.8–10.8)

## 2024-08-18 PROCEDURE — 99285 EMERGENCY DEPT VISIT HI MDM: CPT

## 2024-08-18 PROCEDURE — 99222 1ST HOSP IP/OBS MODERATE 55: CPT | Performed by: NURSE PRACTITIONER

## 2024-08-18 PROCEDURE — 70496 CT ANGIOGRAPHY HEAD: CPT

## 2024-08-18 PROCEDURE — 86900 BLOOD TYPING SEROLOGIC ABO: CPT

## 2024-08-18 PROCEDURE — 71045 X-RAY EXAM CHEST 1 VIEW: CPT

## 2024-08-18 PROCEDURE — 70544 MR ANGIOGRAPHY HEAD W/O DYE: CPT

## 2024-08-18 PROCEDURE — A9270 NON-COVERED ITEM OR SERVICE: HCPCS | Performed by: INTERNAL MEDICINE

## 2024-08-18 PROCEDURE — 93005 ELECTROCARDIOGRAM TRACING: CPT | Performed by: EMERGENCY MEDICINE

## 2024-08-18 PROCEDURE — 84484 ASSAY OF TROPONIN QUANT: CPT

## 2024-08-18 PROCEDURE — 80053 COMPREHEN METABOLIC PANEL: CPT

## 2024-08-18 PROCEDURE — 700102 HCHG RX REV CODE 250 W/ 637 OVERRIDE(OP): Performed by: INTERNAL MEDICINE

## 2024-08-18 PROCEDURE — 700117 HCHG RX CONTRAST REV CODE 255: Performed by: EMERGENCY MEDICINE

## 2024-08-18 PROCEDURE — 85730 THROMBOPLASTIN TIME PARTIAL: CPT

## 2024-08-18 PROCEDURE — 770020 HCHG ROOM/CARE - TELE (206)

## 2024-08-18 PROCEDURE — 70551 MRI BRAIN STEM W/O DYE: CPT

## 2024-08-18 PROCEDURE — 99223 1ST HOSP IP/OBS HIGH 75: CPT | Mod: AI | Performed by: INTERNAL MEDICINE

## 2024-08-18 PROCEDURE — 0042T CT-CEREBRAL PERFUSION ANALYSIS: CPT

## 2024-08-18 PROCEDURE — 85610 PROTHROMBIN TIME: CPT

## 2024-08-18 PROCEDURE — 70498 CT ANGIOGRAPHY NECK: CPT

## 2024-08-18 PROCEDURE — 86901 BLOOD TYPING SEROLOGIC RH(D): CPT

## 2024-08-18 PROCEDURE — 86850 RBC ANTIBODY SCREEN: CPT

## 2024-08-18 PROCEDURE — 94760 N-INVAS EAR/PLS OXIMETRY 1: CPT

## 2024-08-18 PROCEDURE — 36415 COLL VENOUS BLD VENIPUNCTURE: CPT

## 2024-08-18 PROCEDURE — 85025 COMPLETE CBC W/AUTO DIFF WBC: CPT

## 2024-08-18 RX ORDER — ONDANSETRON 4 MG/1
4 TABLET, ORALLY DISINTEGRATING ORAL EVERY 4 HOURS PRN
Status: DISCONTINUED | OUTPATIENT
Start: 2024-08-18 | End: 2024-08-19 | Stop reason: HOSPADM

## 2024-08-18 RX ORDER — ACETAMINOPHEN 500 MG
500 TABLET ORAL
COMMUNITY

## 2024-08-18 RX ORDER — ACETAMINOPHEN 325 MG/1
650 TABLET ORAL EVERY 6 HOURS PRN
Status: DISCONTINUED | OUTPATIENT
Start: 2024-08-18 | End: 2024-08-19 | Stop reason: HOSPADM

## 2024-08-18 RX ORDER — ONDANSETRON 2 MG/ML
4 INJECTION INTRAMUSCULAR; INTRAVENOUS EVERY 4 HOURS PRN
Status: DISCONTINUED | OUTPATIENT
Start: 2024-08-18 | End: 2024-08-19 | Stop reason: HOSPADM

## 2024-08-18 RX ORDER — CLOPIDOGREL BISULFATE 75 MG/1
75 TABLET ORAL DAILY
Status: DISCONTINUED | OUTPATIENT
Start: 2024-08-18 | End: 2024-08-19 | Stop reason: HOSPADM

## 2024-08-18 RX ORDER — NICOTINE 21 MG/24HR
14 PATCH, TRANSDERMAL 24 HOURS TRANSDERMAL
Status: DISCONTINUED | OUTPATIENT
Start: 2024-08-18 | End: 2024-08-19 | Stop reason: HOSPADM

## 2024-08-18 RX ORDER — PAROXETINE 20 MG/1
10 TABLET, FILM COATED ORAL DAILY
Status: DISCONTINUED | OUTPATIENT
Start: 2024-08-18 | End: 2024-08-19 | Stop reason: HOSPADM

## 2024-08-18 RX ORDER — ENOXAPARIN SODIUM 100 MG/ML
30 INJECTION SUBCUTANEOUS DAILY
Status: DISCONTINUED | OUTPATIENT
Start: 2024-08-18 | End: 2024-08-19 | Stop reason: HOSPADM

## 2024-08-18 RX ADMIN — CLOPIDOGREL BISULFATE 75 MG: 75 TABLET ORAL at 14:15

## 2024-08-18 RX ADMIN — IOHEXOL 40 ML: 350 INJECTION, SOLUTION INTRAVENOUS at 10:55

## 2024-08-18 RX ADMIN — NICOTINE 14 MG: 14 PATCH TRANSDERMAL at 13:03

## 2024-08-18 RX ADMIN — PAROXETINE HYDROCHLORIDE 10 MG: 20 TABLET, FILM COATED ORAL at 14:15

## 2024-08-18 RX ADMIN — IOHEXOL 70 ML: 350 INJECTION, SOLUTION INTRAVENOUS at 10:56

## 2024-08-18 SDOH — ECONOMIC STABILITY: TRANSPORTATION INSECURITY
IN THE PAST 12 MONTHS, HAS THE LACK OF TRANSPORTATION KEPT YOU FROM MEDICAL APPOINTMENTS OR FROM GETTING MEDICATIONS?: NO

## 2024-08-18 SDOH — ECONOMIC STABILITY: TRANSPORTATION INSECURITY
IN THE PAST 12 MONTHS, HAS LACK OF RELIABLE TRANSPORTATION KEPT YOU FROM MEDICAL APPOINTMENTS, MEETINGS, WORK OR FROM GETTING THINGS NEEDED FOR DAILY LIVING?: NO

## 2024-08-18 ASSESSMENT — SOCIAL DETERMINANTS OF HEALTH (SDOH)
IN THE PAST 12 MONTHS, HAS THE ELECTRIC, GAS, OIL, OR WATER COMPANY THREATENED TO SHUT OFF SERVICE IN YOUR HOME?: NO
WITHIN THE LAST YEAR, HAVE YOU BEEN HUMILIATED OR EMOTIONALLY ABUSED IN OTHER WAYS BY YOUR PARTNER OR EX-PARTNER?: NO
WITHIN THE LAST YEAR, HAVE TO BEEN RAPED OR FORCED TO HAVE ANY KIND OF SEXUAL ACTIVITY BY YOUR PARTNER OR EX-PARTNER?: NO
WITHIN THE LAST YEAR, HAVE YOU BEEN AFRAID OF YOUR PARTNER OR EX-PARTNER?: NO
WITHIN THE PAST 12 MONTHS, YOU WORRIED THAT YOUR FOOD WOULD RUN OUT BEFORE YOU GOT THE MONEY TO BUY MORE: NEVER TRUE
WITHIN THE PAST 12 MONTHS, THE FOOD YOU BOUGHT JUST DIDN'T LAST AND YOU DIDN'T HAVE MONEY TO GET MORE: NEVER TRUE
WITHIN THE LAST YEAR, HAVE YOU BEEN KICKED, HIT, SLAPPED, OR OTHERWISE PHYSICALLY HURT BY YOUR PARTNER OR EX-PARTNER?: NO

## 2024-08-18 ASSESSMENT — COGNITIVE AND FUNCTIONAL STATUS - GENERAL
SUGGESTED CMS G CODE MODIFIER DAILY ACTIVITY: CH
DAILY ACTIVITIY SCORE: 24
SUGGESTED CMS G CODE MODIFIER MOBILITY: CH
MOBILITY SCORE: 24

## 2024-08-18 ASSESSMENT — ENCOUNTER SYMPTOMS
HALLUCINATIONS: 0
SORE THROAT: 0
SHORTNESS OF BREATH: 0
FALLS: 0
FEVER: 0
CHILLS: 0
SENSORY CHANGE: 1
BLURRED VISION: 0
VOMITING: 0
BACK PAIN: 0
PALPITATIONS: 0
WEAKNESS: 0
DOUBLE VISION: 0
COUGH: 0
NAUSEA: 0
ABDOMINAL PAIN: 0

## 2024-08-18 ASSESSMENT — LIFESTYLE VARIABLES
HAVE YOU EVER FELT YOU SHOULD CUT DOWN ON YOUR DRINKING: NO
HOW MANY TIMES IN THE PAST YEAR HAVE YOU HAD 5 OR MORE DRINKS IN A DAY: 0
ON A TYPICAL DAY WHEN YOU DRINK ALCOHOL HOW MANY DRINKS DO YOU HAVE: 0
SUBSTANCE_ABUSE: 0
TOTAL SCORE: 0
ALCOHOL_USE: NO
AVERAGE NUMBER OF DAYS PER WEEK YOU HAVE A DRINK CONTAINING ALCOHOL: 0
EVER HAD A DRINK FIRST THING IN THE MORNING TO STEADY YOUR NERVES TO GET RID OF A HANGOVER: NO
HAVE PEOPLE ANNOYED YOU BY CRITICIZING YOUR DRINKING: NO
EVER FELT BAD OR GUILTY ABOUT YOUR DRINKING: NO
CONSUMPTION TOTAL: NEGATIVE
TOTAL SCORE: 0
TOTAL SCORE: 0

## 2024-08-18 ASSESSMENT — FIBROSIS 4 INDEX
FIB4 SCORE: 1.68
FIB4 SCORE: 1.34

## 2024-08-18 ASSESSMENT — PAIN DESCRIPTION - PAIN TYPE: TYPE: ACUTE PAIN

## 2024-08-18 NOTE — CONSULTS
"Chief Complaint   Patient presents with    Weakness     Px states she thinks she had a stroke around 8pm last night, unsure how long the symptoms lasted. C/o numbness to the back of her L arm and L flank area. L arm is weaker upon assessment. Px  states px was still \"wobbly\" this AM so they decided to come in.          Neurology Initial Consult H&P  Vascular Neurology Service, [facility]    History of present illness:  Reina Gandara is a 74 y.o. female with a PMHx of DVT, HTN, Alzheimer's disease,intracranial aneurysm s/p coiling who presents today for acute onset left flank and back of left arm paraesthesias that started yesterday afternoon, unclear onset time. Symptoms have completely resolved on presentation her to the ED. Noncontrast CT head is negative for acute findings. CTA head and neck were negative for LVO, dissection, and critical flow limiting stenoses, demonstrates known intracranial coils, a patent left ICA stent, there is significant artifact from the coils degrading evaluation of the right M1 stent. CT Perfusion is negative for CBF defect. Neurology has been consulted for further evaluation of the above.    Referring Provider: Chay Villasenor D.O. has consulted Neurology for further evaluation    Past medical history:   Past Medical History:   Diagnosis Date    Acute nasopharyngitis     cold 2/1/20    Blood clotting disorder (HCC) 2016/2017    DVT    Contact dermatitis due to cosmetics 9/16/2015    Environmental and seasonal allergies     Guillain Barré syndrome (HCC)     Hx     Hemorrhagic disorder (HCC)     plavix    History of kidney stones     x2 episodes    History of seizures     x2 episodes - no treatment needed    Hyperlipidemia     Hypertension     Intracranial aneurysm     PAD (peripheral artery disease) 5/7/2014    Psychiatric problem     depression    Reactive depression 9/17/2013    Renal disorder     stones    Seizure (HCC) 1988    unknown " cause       Past surgical history:   Past Surgical History:   Procedure Laterality Date    THROMBECTOMY Right 5/25/2018    Procedure: FEMORAL ENDARTERECTOMY, ANGIOPLASTY WITH PATCH, FOREIGN BODY REMOVAL;  Surgeon: Carina Durbin M.D.;  Location: SURGERY [facility];  Service: General    RECOVERY  10/4/2013    Performed by [facility] at SURGERY SAME DAY [facility]    FEMORAL POPLITEAL BYPASS  7/7/2013    Performed by Carina Durbin M.D. at SURGERY [facility]    RECOVERY  7/6/2013    Performed by [facility] at SURGERY [facility]    ABDOMINAL HYSTERECTOMY TOTAL  1981    w/o BSO due to fibroids    APPENDECTOMY      during hysterectomy    ORIF, WRIST      OTHER      neuroma removed from feet 4-5x    OTHER      abdominal stent    TONSILLECTOMY         Family history:   Family History   Problem Relation Age of Onset    Other Sister         tremors / instability    Lung Disease Sister         COPD     Hyperlipidemia Sister     Other Paternal Grandfather         brain aneurysm       Social history:   Social History     Socioeconomic History    Marital status:      Spouse name: Not on file    Number of children: Not on file    Years of education: Not on file    Highest education level: Not on file   Occupational History    Occupation:      Employer: [employer]   Tobacco Use    Smoking status: Some Days     Current packs/day: 0.50     Average packs/day: 0.5 packs/day for 50.0 years (25.0 ttl pk-yrs)     Types: Cigarettes    Smokeless tobacco: Never   Vaping Use    Vaping status: Never Used   Substance and Sexual Activity    Alcohol use: Not Currently     Alcohol/week: 0.6 oz     Types: 1 Glasses of wine per week    Drug use: No    Sexual activity: Not Currently     Comment: one son; ;  wk: insurance   Other Topics Concern    Not on file   Social History Narrative    Not on file     Social Determinants of Health     Financial Resource Strain:  Not on file   Food Insecurity: Not on file   Transportation Needs: Not on file   Physical Activity: Not on file   Stress: Not on file   Social Connections: Not on file   Intimate Partner Violence: Not on file   Housing Stability: Not on file       Current medications:   Current Facility-Administered Medications   Medication Dose    clopidogrel (Plavix) tablet 75 mg  75 mg    PARoxetine (Paxil) tablet 10 mg  10 mg    enoxaparin (Lovenox) inj 40 mg  40 mg    acetaminophen (Tylenol) tablet 650 mg  650 mg    ondansetron (Zofran) syringe/vial injection 4 mg  4 mg    ondansetron (Zofran ODT) dispertab 4 mg  4 mg     Current Outpatient Medications   Medication    clopidogrel (PLAVIX) 75 MG Tab    PARoxetine (PAXIL) 10 MG Tab    Omega-3 Fatty Acids (FISH OIL) 600 MG Cap    vitamin e (VITAMIN E) 400 UNIT Cap    cholecalciferol (D-3-5) 5000 UNIT Cap    B Complex Vitamins (B COMPLEX B-12 PO)    therapeutic multivitamin-minerals (THERAGRAN-M) Tab    Ascorbic Acid (VITAMIN C CR) 1000 MG Tab CR       Medication Allergy:  Allergies   Allergen Reactions    Bee Anaphylaxis    Nsaids Swelling     ADVIL  2 trips to ER w reactions of rash swelling with difficulty breathing.    Codeine Nausea     dizzy    Keflex Vomiting     No trouble with amoxil       Review of systems:   Constitutional: denies fever, night sweats, weight loss.   Eyes: denies acute vision change, eye pain or secretion.   Ears, Nose, Mouth, Throat: denies nasal secretion, nasal bleeding, difficulty swallowing, hearing loss, tinnitus, vertigo, ear pain, acute dental problems, oral ulcers or lesions.   Endocrine: denies recent weight changes, heat or cold intolerance, polyuria, polydypsia, polyphagia,abnormal hair growth.  Cardiovascular: denies new onset of chest pain, palpitations, syncope, or dyspnea of exertion.  Pulmonary: denies shortness of breath, new onset of cough, hemoptysis, wheezing, chest pain or flu-like symptoms.   GI: denies nausea, vomiting, diarrhea,  "GI bleeding, change in appetite, abdominal pain, and change in bowel habits.  : denies dysuria, urinary incontinence, hematuria.  Heme/oncology: denies history of easy bruising or bleeding. No history of cancer, DVTor PE.  Allergy/immunology: denies hives/urticaria, or itching.   Dermatologic: denies new rash, or new skin lesions.  Musculoskeletal:denies joint swelling or pain, muscle pain, neck and back pain.   Neurologic: denies headaches, acute visual changes, facial droopiness, muscle weakness (focal or generalized), left flank paraesthesias, denies anesthesia, ataxia, change in speech or language, memory loss, abnormal movements, seizures, loss of consciousness, or episodes of confusion.   Psychiatric: denies symptoms of depression, anxiety, hallucinations, mood swings or changes, suicidal or homicidal thoughts.     Physical examination:   Vitals:    08/18/24 0827 08/18/24 1000 08/18/24 1100 08/18/24 1200   BP:  (!) 152/66 (!) 163/99 (!) 169/78   Pulse:  (!) 53 69 73   Resp:  18 17 19   Temp:       TempSrc:       SpO2:  93% 93% 95%   Weight: 42.7 kg (94 lb 2.2 oz)      Height: 1.575 m (5' 2.01\")        General: Patient in no acute distress, pleasant and cooperative.  HEENT: Normocephalic, no signs of acute trauma.   Neck: supple, no meningeal signs or carotid bruits. There is normal range of motion. No tenderness on exam.   Chest: clear to auscultation. No cough.   CV: RRR, no murmurs.   Skin: no signs of acute rashes or trauma.   Musculoskeletal: joints exhibit full range of motion, without any pain to palpation. There are no signs of joint or muscle swelling. There is no tenderness to deep palpation of muscles.   Psychiatric: No hallucinatory behavior. Denies symptoms of depression or suicidal ideation. Mood and affect appear normal on exam.     NEUROLOGICAL EXAM:   Mental status, orientation: Awake, alert and fully oriented.   Speech and language: speech is clear and fluent. The patient is able to name, " repeat and comprehend.   Memory: There is intact recollection of recent and remote events.   Cranial nerve exam: Pupils are 3-4 mm bilaterally and equally reactive to light and accommodation. Visual fields are intact by confrontation. There is no nystagmus on primary or secondary gaze. Intact full EOM in all directions of gaze. Face appears symmetric. Sensation in the face is intact to light touch. Uvula is midline. Tongue is midline. Shoulder shrug is intact bilaterally.   Motor exam: Strength is 5/5 in all extremities. Tone is normal.   Sensory exam reveals normal sense of light touch, proprioception, vibration and pinprick in all extremities.   Deep tendon reflexes:  2+ throughout. Plantar responses are flexor. There is no clonus.   Coordination: shows a normal finger-nose-finger. No ataxia noted  Gait: Not assessed due to patient preference    NIHSS: National Institutes of Health Stroke Scale    [0] 1a:Level of Consciousness    0-alert 1-drowsy   2-stupor   3-coma  [0] 1b:LOC Questions                  0-both  1-one      2-neither  [0] 1c:LOC Commands                   0-both  1-one      2-neither  [0] 2: Best Gaze                     0-nl    1-partial  2-forced  [0] 3: Visual Fields                   0-nl    1-partial  2-complete 3-bilat  [0] 4: Facial Paresis                0-nl    1-minor    2-partial  3-full  MOTOR                       0-nl  [0] 5: Right Arm           1-drift  [0] 6: Left Arm             2-some effort vs gravity  [0] 7: Right Leg           3-no effort vs gravity  [0] 8: Left Leg             4-no movement                             x-untestable  [0] 9: Limb Ataxia                    0-abs   1-1_limb   2-2+_limbs       x-untestable  [0] 10:Sensory                        0-nl    1-partial  2-dense  [0] 11:Best Language/Aphasia         0-nl    1-mild/mod 2-severe   3-mute  [0] 12:Dysarthria                     0-nl    1-mild/mod 2-severe       x-untestable  [0] 13:Neglect/Inattention             0-none  1-partial  2-complete  [0] TOTAL    NIHSS Time: 08/18/2024 1203    ANCILLARY DATA REVIEWED:     Lab Data Review:  Recent Results (from the past 24 hour(s))   CBC WITH DIFFERENTIAL    Collection Time: 08/18/24  9:50 AM   Result Value Ref Range    WBC 8.4 4.8 - 10.8 K/uL    RBC 4.29 4.20 - 5.40 M/uL    Hemoglobin 13.3 12.0 - 16.0 g/dL    Hematocrit 40.7 37.0 - 47.0 %    MCV 94.9 81.4 - 97.8 fL    MCH 31.0 27.0 - 33.0 pg    MCHC 32.7 32.2 - 35.5 g/dL    RDW 49.4 35.9 - 50.0 fL    Platelet Count 287 164 - 446 K/uL    MPV 9.3 9.0 - 12.9 fL    Neutrophils-Polys 71.10 44.00 - 72.00 %    Lymphocytes 21.00 (L) 22.00 - 41.00 %    Monocytes 6.30 0.00 - 13.40 %    Eosinophils 0.90 0.00 - 6.90 %    Basophils 0.50 0.00 - 1.80 %    Immature Granulocytes 0.20 0.00 - 0.90 %    Nucleated RBC 0.00 0.00 - 0.20 /100 WBC    Neutrophils (Absolute) 6.00 1.82 - 7.42 K/uL    Lymphs (Absolute) 1.77 1.00 - 4.80 K/uL    Monos (Absolute) 0.53 0.00 - 0.85 K/uL    Eos (Absolute) 0.08 0.00 - 0.51 K/uL    Baso (Absolute) 0.04 0.00 - 0.12 K/uL    Immature Granulocytes (abs) 0.02 0.00 - 0.11 K/uL    NRBC (Absolute) 0.00 K/uL   COMP METABOLIC PANEL    Collection Time: 08/18/24  9:50 AM   Result Value Ref Range    Sodium 140 135 - 145 mmol/L    Potassium 4.3 3.6 - 5.5 mmol/L    Chloride 107 96 - 112 mmol/L    Co2 21 20 - 33 mmol/L    Anion Gap 12.0 7.0 - 16.0    Glucose 92 65 - 99 mg/dL    Bun 12 8 - 22 mg/dL    Creatinine 0.46 (L) 0.50 - 1.40 mg/dL    Calcium 8.8 8.5 - 10.5 mg/dL    Correct Calcium 8.8 8.5 - 10.5 mg/dL    AST(SGOT) 16 12 - 45 U/L    ALT(SGPT) 10 2 - 50 U/L    Alkaline Phosphatase 148 (H) 30 - 99 U/L    Total Bilirubin 0.5 0.1 - 1.5 mg/dL    Albumin 4.0 3.2 - 4.9 g/dL    Total Protein 6.5 6.0 - 8.2 g/dL    Globulin 2.5 1.9 - 3.5 g/dL    A-G Ratio 1.6 g/dL   PROTHROMBIN TIME    Collection Time: 08/18/24  9:50 AM   Result Value Ref Range    PT 13.4 12.0 - 14.6 sec    INR 1.01 0.87 - 1.13   APTT    Collection Time: 08/18/24  9:50  AM   Result Value Ref Range    APTT 31.1 24.7 - 36.0 sec   COD (ADULT)    Collection Time: 24  9:50 AM   Result Value Ref Range    ABO Grouping Only O     Rh Grouping Only POS     Antibody Screen-Cod NEG    TROPONIN    Collection Time: 24  9:50 AM   Result Value Ref Range    Troponin T <6 6 - 19 ng/L   ESTIMATED GFR    Collection Time: 24  9:50 AM   Result Value Ref Range    GFR (CKD-EPI) 99 >60 mL/min/1.73 m 2   EKG (NOW)    Collection Time: 24 10:14 AM   Result Value Ref Range    Report       [facility]    Test Date:  2024  Pt Name:    VIKAS CHRISTINE            Department: ER  MRN:        [id number]                      Room:        12  Gender:     Female                       Technician: [id number]  :        [id number]                   Requested By:CARO CONDE  Order #:    [id number]                    Reading MD:    Measurements  Intervals                                Axis  Rate:       51                           P:          75  MO:         144                          QRS:        56  QRSD:       108                          T:          53  QT:         435  QTc:        401    Interpretive Statements  Sinus bradycardia  Compared to ECG 2019 01:17:28  Sinus rhythm no longer present  Left ventricular hypertrophy no longer present  Myocardial infarct finding no longer present  T-wave abnormality no longer present     ABO Rh Confirm    Collection Time: 24 11:55 AM   Result Value Ref Range    ABO Rh Confirm O POS        Labs reviewed by me.     No intake/output data recorded.    Imaging reviewed by me:     CT-CTA NECK WITH & W/O-POST PROCESSING   Final Result      1.  Moderate 50-70% proximal left ICA stenosis.   2.  Moderate plaque at the right carotid bifurcation and bulb with less than 50% stenosis.   3.  Mild to moderate bilateral vertebral artery ostial stenosis.      CT-CTA HEAD WITH & W/O-POST PROCESS   Final Result      1.   "Patent distal left internal carotid artery stent.   2.  Significant artifact from right distal ICA and right MCA bifurcation aneurysm coil masses and distal right M1 segment stent significantly degraded evaluation.   3.  No definite acute large vessel occlusion.      CT-CEREBRAL PERFUSION ANALYSIS   Final Result      1. Cerebral blood flow less than 30% possibly representing completed infarct = 0 mL.   2. T Max more than 6 seconds possibly representing combination of completed infarct and ischemia = 0 mL.   3. Mismatched volume possibly representing ischemic brain/penumbra= 0 mL      4.  Please note that this cerebral perfusion study and report is Quantitative and targets supratentorial (cerebral) perfusion for evaluation of large vessel territory acute ischemia/infarction. For example, lacunar infarcts, and brainstem/posterior fossa    ischemia/infarction are not evaluated on this study.  Data acquisition is subject to artifacts which can yield non-anatomically plausible perfusion maps which may be due to motion, bolus timing, signal to noise ratio, or other technical factors.    Perfusion map abnormalities which show non-anatomic distributions are likely artifact.   This study is not \"stand-alone\" and should only be utilized for diagnosis, management/treatment in correlation with CT, CTA, and/or MRI and clinical factors.         DX-CHEST-PORTABLE (1 VIEW)   Final Result      No acute process.            Presumed mechanism by TOAST:  __Large Artery Atherosclerosis  __Small Vessel (Lacunar)  __Cardioembolic  __Other (Sickle Cell, Vasculitis, Hypercoagulable)  X_Unknown    Modified Lesly Scale (MRS): 0 = No symptoms      ASSESSMENT AND PLAN:    Assessment and Plan:     74 y.o. F with extensive medical history to include intracranial coils, Right M1 stent, and left ICA stent who presented with transient left flank and left back of arm paraesthesias. Symptoms have resolved prior to arrival in the ED. NCCTH was " negative for acute intracranial abnormalities. CTA head and neck were negative for LVO, dissection, and critical flow limiting stenoses, demonstrates known intracranial coils, a patent left ICA stent, there is significant artifact from the coils degrading evaluation of the right M1 stent. CT Perfusion is negative for CBF defect. There is low suspicion for an acute ischemic process at this time. We recommend obtaining MRI brain without contrast, as well as MRA head without contrast as well to evaluate patency of the right M1 stent. Also recommend continuing home dose Plavix. Normotensive blood pressure goal 110-130/60-80.       Case reviewed and plan created with Dr. Joaquín Pastrana, Vascular Neurology. Please call with any questions      CAPRICE Underwood.  Vascular Neurology, [facility]

## 2024-08-18 NOTE — ASSESSMENT & PLAN NOTE
CT/CTA head and neck for acute process.    Neurology consulted.    Admit to tele  Neuro checks  MRI ordered.

## 2024-08-18 NOTE — ED NOTES
Patient assisted to restroom; patient declined the use of bedside commode, per request patient was wheeled to Bathroom by staff but assisted out of bed into wheelchair and from wheelchair to toilet by spouse and back to [place] without incident, patient back on monitor and resting in gurney. Patient and spouse again requesting to have RN provide them an update, reinforced that RN is in another room but will be in as soon as she is available.

## 2024-08-18 NOTE — ED TRIAGE NOTES
"Chief Complaint   Patient presents with    Weakness     Px states she thinks she had a stroke around 8pm last night, unsure how long the symptoms lasted. C/o numbness to the back of her L arm and L flank area. L arm is weaker upon assessment. Px  states px was still \"wobbly\" this AM so they decided to come in.        73 yo female to triage for above complaint.     Pt is alert and oriented, speaking in full sentences, follows commands and responds appropriately to questions. No facial droop noted.  states she has a brain aneurysm with coiling     Patient placed back in [facility] and educated on triage process. Asked to inform RN of any changes.    BP (!) 162/76   Pulse 80   Temp 36.6 °C (97.8 °F) (Temporal)   Resp 18   Ht 1.575 m (5' 2.01\")   Wt 42.7 kg (94 lb 2.2 oz)   LMP  (LMP Unknown)   SpO2 96%   BMI 17.21 kg/m²     "

## 2024-08-18 NOTE — ED PROVIDER NOTES
"  ER Provider Note    Scribed for Jacek Durbin M.D. by Becky Mota. 8/18/2024   9:22 AM    Primary Care Provider: Cristin Gale M.D.    CHIEF COMPLAINT  Chief Complaint   Patient presents with    Weakness     Px states she thinks she had a stroke around 8pm last night, unsure how long the symptoms lasted. C/o numbness to the back of her L arm and L flank area. L arm is weaker upon assessment. Px  states px was still \"wobbly\" this AM so they decided to come in.      EXTERNAL RECORDS REVIEWED  External ED Note Patient was most recently in clinic 12 days ago for refill of Paxil.    HPI/ROS  LIMITATION TO HISTORY   Select: : None  OUTSIDE HISTORIAN(S):  Significant other was present at bedside to provide additional patient history.     Reina Gandara is a 74 y.o. female who presents to the ED for evaluation of weakness onset 8 PM last night. The patient explains she endorsed loss of feeling on the whole left side of her body from neck down. She notes she was still able to move her extremities. However, the patient adds she feels weakness on her left upper arm. She also states \"feeling wobbly\" yesterday and that symptom continued to today. She denies having any facial weakness or loss of sensation. Patient still notes having weakness in her left upper arm. She had Sciatica last week. She also has history of four aneurysms.     PAST MEDICAL HISTORY  Past Medical History:   Diagnosis Date    Acute nasopharyngitis     cold 2/1/20    Blood clotting disorder (HCC) 2016/2017    DVT    Contact dermatitis due to cosmetics 9/16/2015    Environmental and seasonal allergies     Guillain Barré syndrome (HCC)     Hx     Hemorrhagic disorder (HCC)     plavix    History of kidney stones     x2 episodes    History of seizures     x2 episodes - no treatment needed    Hyperlipidemia     Hypertension     Intracranial aneurysm     PAD (peripheral artery disease) 5/7/2014    Psychiatric problem     depression    " Reactive depression 9/17/2013    Renal disorder     stones    Seizure (HCC) 1988    unknown cause       SURGICAL HISTORY  Past Surgical History:   Procedure Laterality Date    THROMBECTOMY Right 5/25/2018    Procedure: FEMORAL ENDARTERECTOMY, ANGIOPLASTY WITH PATCH, FOREIGN BODY REMOVAL;  Surgeon: Carina Durbin M.D.;  Location: SURGERY [facility];  Service: General    RECOVERY  10/4/2013    Performed by [facility] at SURGERY SAME DAY [facility]    FEMORAL POPLITEAL BYPASS  7/7/2013    Performed by Carina Durbin M.D. at SURGERY [facility]    RECOVERY  7/6/2013    Performed by [facility] at SURGERY [facility]    ABDOMINAL HYSTERECTOMY TOTAL  1981    w/o BSO due to fibroids    APPENDECTOMY      during hysterectomy    ORIF, WRIST      OTHER      neuroma removed from feet 4-5x    OTHER      abdominal stent    TONSILLECTOMY         FAMILY HISTORY  Family History   Problem Relation Age of Onset    Other Sister         tremors / instability    Lung Disease Sister         COPD     Hyperlipidemia Sister     Other Paternal Grandfather         brain aneurysm       SOCIAL HISTORY   reports that she has been smoking cigarettes. She has a 25 pack-year smoking history. She has never used smokeless tobacco. She reports that she does not currently use alcohol after a past usage of about 0.6 oz of alcohol per week. She reports that she does not use drugs.    CURRENT MEDICATIONS  Previous Medications    ASCORBIC ACID (VITAMIN C CR) 1000 MG TAB CR    Take 1 Tab by mouth every day.    B COMPLEX VITAMINS (B COMPLEX B-12 PO)    Take  by mouth.    CHOLECALCIFEROL (D-3-5) 5000 UNIT CAP    Take 5,000 Units by mouth every day.    CLOPIDOGREL (PLAVIX) 75 MG TAB    Take 1 Tablet by mouth every day.    OMEGA-3 FATTY ACIDS (FISH OIL) 600 MG CAP    Take  by mouth.    PAROXETINE (PAXIL) 10 MG TAB    Take 1 Tablet by mouth every day. with food    THERAPEUTIC MULTIVITAMIN-MINERALS (THERAGRAN-M) TAB    Take 1  "Tab by mouth every day.    VITAMIN E (VITAMIN E) 400 UNIT CAP    Take 400 Units by mouth every day.       ALLERGIES  Allergies   Allergen Reactions    Bee Anaphylaxis    Nsaids Swelling     ADVIL  2 trips to ER w reactions of rash swelling with difficulty breathing.    Codeine Nausea     dizzy    Keflex Vomiting     No trouble with amoxil        PHYSICAL EXAM  BP (!) 162/76   Pulse 80   Temp 36.6 °C (97.8 °F) (Temporal)   Resp 18   Ht 1.575 m (5' 2.01\")   Wt 42.7 kg (94 lb 2.2 oz)   LMP  (LMP Unknown)   SpO2 96%   BMI 17.21 kg/m²    Nursing note and vitals reviewed.  Constitutional: Well-developed and well-nourished. No distress.   HENT: Head is normocephalic and atraumatic. Oropharynx is clear and moist without exudate or erythema.   Eyes: Pupils are equal, round, and reactive to light. Conjunctiva are normal.   Cardiovascular: Normal rate and regular rhythm. No murmur heard. Normal radial pulses.  Pulmonary/Chest: Breath sounds normal. No wheezes or rales.   Abdominal: Soft and non-tender. No distention    Musculoskeletal: Extremities exhibit normal range of motion without edema or tenderness.   Neurological: Awake, alert and oriented to person, place, and time. No focal deficits noted.  Skin: Skin is warm and dry. No rash.   Psychiatric: Normal mood and affect. Appropriate for clinical situation    DIAGNOSTIC STUDIES    Labs:   Results for orders placed or performed during the hospital encounter of 08/18/24   CBC WITH DIFFERENTIAL   Result Value Ref Range    WBC 8.4 4.8 - 10.8 K/uL    RBC 4.29 4.20 - 5.40 M/uL    Hemoglobin 13.3 12.0 - 16.0 g/dL    Hematocrit 40.7 37.0 - 47.0 %    MCV 94.9 81.4 - 97.8 fL    MCH 31.0 27.0 - 33.0 pg    MCHC 32.7 32.2 - 35.5 g/dL    RDW 49.4 35.9 - 50.0 fL    Platelet Count 287 164 - 446 K/uL    MPV 9.3 9.0 - 12.9 fL    Neutrophils-Polys 71.10 44.00 - 72.00 %    Lymphocytes 21.00 (L) 22.00 - 41.00 %    Monocytes 6.30 0.00 - 13.40 %    Eosinophils 0.90 0.00 - 6.90 %    " Basophils 0.50 0.00 - 1.80 %    Immature Granulocytes 0.20 0.00 - 0.90 %    Nucleated RBC 0.00 0.00 - 0.20 /100 WBC    Neutrophils (Absolute) 6.00 1.82 - 7.42 K/uL    Lymphs (Absolute) 1.77 1.00 - 4.80 K/uL    Monos (Absolute) 0.53 0.00 - 0.85 K/uL    Eos (Absolute) 0.08 0.00 - 0.51 K/uL    Baso (Absolute) 0.04 0.00 - 0.12 K/uL    Immature Granulocytes (abs) 0.02 0.00 - 0.11 K/uL    NRBC (Absolute) 0.00 K/uL   COMP METABOLIC PANEL   Result Value Ref Range    Sodium 140 135 - 145 mmol/L    Potassium 4.3 3.6 - 5.5 mmol/L    Chloride 107 96 - 112 mmol/L    Co2 21 20 - 33 mmol/L    Anion Gap 12.0 7.0 - 16.0    Glucose 92 65 - 99 mg/dL    Bun 12 8 - 22 mg/dL    Creatinine 0.46 (L) 0.50 - 1.40 mg/dL    Calcium 8.8 8.5 - 10.5 mg/dL    Correct Calcium 8.8 8.5 - 10.5 mg/dL    AST(SGOT) 16 12 - 45 U/L    ALT(SGPT) 10 2 - 50 U/L    Alkaline Phosphatase 148 (H) 30 - 99 U/L    Total Bilirubin 0.5 0.1 - 1.5 mg/dL    Albumin 4.0 3.2 - 4.9 g/dL    Total Protein 6.5 6.0 - 8.2 g/dL    Globulin 2.5 1.9 - 3.5 g/dL    A-G Ratio 1.6 g/dL   PROTHROMBIN TIME   Result Value Ref Range    PT 13.4 12.0 - 14.6 sec    INR 1.01 0.87 - 1.13   APTT   Result Value Ref Range    APTT 31.1 24.7 - 36.0 sec   COD (ADULT)   Result Value Ref Range    ABO Grouping Only O     Rh Grouping Only POS     Antibody Screen-Cod NEG    TROPONIN   Result Value Ref Range    Troponin T <6 6 - 19 ng/L   ESTIMATED GFR   Result Value Ref Range    GFR (CKD-EPI) 99 >60 mL/min/1.73 m 2   EKG (NOW)   Result Value Ref Range    Report       [facility] Emergency Dept.    Test Date:  2024  Pt Name:    VIKAS CHRISTINE            Department: ER  MRN:        [id number]                      Room:        12  Gender:     Female                       Technician: [id number]  :        [id number]                   Requested By:CARO CONDE  Order #:    [id number]                    Reading MD:    Measurements  Intervals                                Axis  Rate:        51                           P:          75  NC:         144                          QRS:        56  QRSD:       108                          T:          53  QT:         435  QTc:        401    Interpretive Statements  Sinus bradycardia  Compared to ECG 01/08/2019 01:17:28  Sinus rhythm no longer present  Left ventricular hypertrophy no longer present  Myocardial infarct finding no longer present  T-wave abnormality no longer present         EKG:   I have independently interpreted this EKG as detailed above.       Radiology:   This attending emergency physician has independently interpreted the diagnostic imaging associated with this visit and is awaiting the final reading from the radiologist.   Preliminary interpretation is a follows: CT head shows no intracranial hemorrhage.  No evidence of stroke.    Radiologist interpretation:   CT-CTA NECK WITH & W/O-POST PROCESSING   Final Result      1.  Moderate 50-70% proximal left ICA stenosis.   2.  Moderate plaque at the right carotid bifurcation and bulb with less than 50% stenosis.   3.  Mild to moderate bilateral vertebral artery ostial stenosis.      CT-CTA HEAD WITH & W/O-POST PROCESS   Final Result      1.  Patent distal left internal carotid artery stent.   2.  Significant artifact from right distal ICA and right MCA bifurcation aneurysm coil masses and distal right M1 segment stent significantly degraded evaluation.   3.  No definite acute large vessel occlusion.      CT-CEREBRAL PERFUSION ANALYSIS   Final Result      1. Cerebral blood flow less than 30% possibly representing completed infarct = 0 mL.   2. T Max more than 6 seconds possibly representing combination of completed infarct and ischemia = 0 mL.   3. Mismatched volume possibly representing ischemic brain/penumbra= 0 mL      4.  Please note that this cerebral perfusion study and report is Quantitative and targets supratentorial (cerebral) perfusion for evaluation of large vessel territory acute  "ischemia/infarction. For example, lacunar infarcts, and brainstem/posterior fossa    ischemia/infarction are not evaluated on this study.  Data acquisition is subject to artifacts which can yield non-anatomically plausible perfusion maps which may be due to motion, bolus timing, signal to noise ratio, or other technical factors.    Perfusion map abnormalities which show non-anatomic distributions are likely artifact.   This study is not \"stand-alone\" and should only be utilized for diagnosis, management/treatment in correlation with CT, CTA, and/or MRI and clinical factors.         DX-CHEST-PORTABLE (1 VIEW)   Final Result      No acute process.           INITIAL ASSESSMENT AND PLAN    9:22 AM - Patient was evaluated at bedside. Ordered for CT-Cerebral perfusion analysis, CT-CTA head with and w/o post-process, CT-CTA neck with  and without post-processing, DX-chest-portable, eGFR, CBC with diff, CMP, Prothrombin time, APTT, COD, troponin and EKG to evaluate. Patient verbalizes understanding and support with my plan of care.  Differential diagnoses include but not limited to: TIA, CVA, Allen's paralysis, electrolyte abnormality    ED Observation Status? No; Patient does not meet criteria for ED Observation.      COURSE AND MEDICAL DECISION MAKING    10:32 AM- Patient and  refuse contrast for CTA.     10:38 AM- I spoke to family about CT scans and risks. They were agreeable.     11:27 AM- I paged for Neurology at this time.     11:34 AM- I spoke to Dr. Pastrana (Neurology) at this time.     11:38 AM- Patient is not a thrombolytic candidate due to duration of symptoms.     11:42 AM - I spoke to Dr. Villasenor (internal Medicine) about plan of admission. He was agreeable.       DISPOSITION AND DISCUSSIONS    I have discussed management of the patient with the following physicians and INDRA's:   Dr. Pastrana (Neurology), Dr. Villasenor (internal Medicine)     Discussion of management with other [facility] or appropriate source(s): None "     DISPOSITION:  Patient will be hospitalized by Dr. Villasenor in guarded condition.    FINAL DIAGNOSIS  1. Left-sided weakness    2. Internal carotid artery stenosis, left      IBecky (Scribe), am scribing for, and in the presence of, Jacek Durbin M.D..    Electronically signed by: Becky Mota (Stephanie), 8/18/2024    Jacek HARRELL M.D. personally performed the services described in this documentation, as scribed by Becky Mota in my presence, and it is both accurate and complete.      The note accurately reflects work and decisions made by me.  Jacek Durbin M.D.  8/18/2024  1:41 PM

## 2024-08-18 NOTE — H&P
"Hospital Medicine History & Physical Note    Date of Service  8/18/2024    Primary Care Physician  Cristin Gale M.D.    Consultants  neurology    Code Status  Full Code    Chief Complaint  Chief Complaint   Patient presents with    Weakness     Px states she thinks she had a stroke around 8pm last night, unsure how long the symptoms lasted. C/o numbness to the back of her L arm and L flank area. L arm is weaker upon assessment. Px  states px was still \"wobbly\" this AM so they decided to come in.        History of Presenting Illness  Reina Gandara is a 74 y.o. female who presented 8/18/2024 with left upper extremity sensation changes.  History was significant for mild Alzheimer disease cerebral aneurysm status post coiling.  Presents after experiencing onset of left upper extremity sensation loss as well as left lower extremity sensation loss which both started yesterday.  At the time of exam her sensation loss to the lower extremity has resolved.  Patient continues left upper extremity sensation loss localized to her left deltoid region.  Muscle strength is intact.  CT/CTA head and neck for acute process.  Neurology consulted.  Patient will be admitted for further workup and monitoring.  MRI ordered.    I discussed the plan of care with patient, family, and neurology, ER provider .    Review of Systems  Review of Systems   Constitutional:  Negative for chills and fever.   HENT:  Negative for congestion and sore throat.    Eyes:  Negative for blurred vision and double vision.   Respiratory:  Negative for cough and shortness of breath.    Cardiovascular:  Negative for chest pain and palpitations.   Gastrointestinal:  Negative for abdominal pain, nausea and vomiting.   Genitourinary:  Negative for dysuria and frequency.   Musculoskeletal:  Negative for back pain and falls.   Skin:  Negative for rash.   Neurological:  Positive for sensory change. Negative for weakness.   Psychiatric/Behavioral: "  Negative for hallucinations and substance abuse.        Past Medical History   has a past medical history of Acute nasopharyngitis, Blood clotting disorder ([facility]) (2016/2017), Contact dermatitis due to cosmetics (9/16/2015), Environmental and seasonal allergies, Guillain Barré syndrome ([facility]), Hemorrhagic disorder ([facility]), History of kidney stones, History of seizures, Hyperlipidemia, Hypertension, Intracranial aneurysm, PAD (peripheral artery disease) (5/7/2014), Psychiatric problem, Reactive depression (9/17/2013), Renal disorder, and Seizure ([facility]) (1988).    Surgical History   has a past surgical history that includes tonsillectomy; orif, wrist; abdominal hysterectomy total (1981); recovery (7/6/2013); femoral popliteal bypass (7/7/2013); other; other; recovery (10/4/2013); appendectomy; and thrombectomy (Right, 5/25/2018).     Family History  family history includes Hyperlipidemia in her sister; Lung Disease in her sister; Other in her paternal grandfather and sister.   Family history reviewed with patient. There is no family history that is pertinent to the chief complaint.     Social History   reports that she has been smoking cigarettes. She has a 25 pack-year smoking history. She has never used smokeless tobacco. She reports that she does not currently use alcohol after a past usage of about 0.6 oz of alcohol per week. She reports that she does not use drugs.    Allergies  Allergies   Allergen Reactions    Bee Anaphylaxis    Nsaids Swelling     ADVIL  2 trips to ER w reactions of rash swelling with difficulty breathing.    Codeine Nausea     dizzy    Keflex Vomiting     No trouble with amoxil       Medications  Prior to Admission Medications   Prescriptions Last Dose Informant Patient Reported? Taking?   Ascorbic Acid (VITAMIN C CR) 1000 MG Tab CR  Patient Yes No   Sig: Take 1 Tab by mouth every day.   B Complex Vitamins (B COMPLEX B-12 PO)   Yes No   Sig: Take  by mouth.   Omega-3 Fatty Acids (FISH OIL) 600 MG  Cap   Yes No   Sig: Take  by mouth.   PARoxetine (PAXIL) 10 MG Tab   No No   Sig: Take 1 Tablet by mouth every day. with food   cholecalciferol (D-3-5) 5000 UNIT Cap   Yes No   Sig: Take 5,000 Units by mouth every day.   clopidogrel (PLAVIX) 75 MG Tab   No No   Sig: Take 1 Tablet by mouth every day.   therapeutic multivitamin-minerals (THERAGRAN-M) Tab  Patient Yes No   Sig: Take 1 Tab by mouth every day.   vitamin e (VITAMIN E) 400 UNIT Cap   Yes No   Sig: Take 400 Units by mouth every day.      Facility-Administered Medications: None       Physical Exam  Temp:  [36.6 °C (97.8 °F)] 36.6 °C (97.8 °F)  Pulse:  [53-80] 73  Resp:  [17-19] 19  BP: (152-169)/(66-99) 169/78  SpO2:  [93 %-96 %] 95 %  Blood Pressure : (!) 163/99   Temperature: 36.6 °C (97.8 °F)   Pulse: 69   Respiration: 17   Pulse Oximetry: 93 %       Physical Exam  Vitals and nursing note reviewed.   Constitutional:       General: She is not in acute distress.     Appearance: She is not toxic-appearing.   HENT:      Right Ear: External ear normal.      Left Ear: External ear normal.      Mouth/Throat:      Pharynx: No oropharyngeal exudate.   Eyes:      General: No scleral icterus.  Cardiovascular:      Rate and Rhythm: Normal rate.      Heart sounds: No murmur heard.  Pulmonary:      Breath sounds: No wheezing.   Abdominal:      Tenderness: There is no abdominal tenderness. There is no guarding or rebound.   Musculoskeletal:         General: No swelling or deformity.   Skin:     Coloration: Skin is not jaundiced.      Findings: No bruising.   Neurological:      Mental Status: She is alert. Mental status is at baseline.      Motor: No weakness.      Comments: Muscle strength testing intact  Decreased sensation in deltoid region of left upper extremity  No Pronator drift   Psychiatric:         Mood and Affect: Mood normal.         Behavior: Behavior normal.         Laboratory:  Recent Labs     08/18/24  0950   WBC 8.4   RBC 4.29   HEMOGLOBIN 13.3  "  HEMATOCRIT 40.7   MCV 94.9   MCH 31.0   MCHC 32.7   RDW 49.4   PLATELETCT 287   MPV 9.3     Recent Labs     08/18/24  0950   SODIUM 140   POTASSIUM 4.3   CHLORIDE 107   CO2 21   GLUCOSE 92   BUN 12   CREATININE 0.46*   CALCIUM 8.8     Recent Labs     08/18/24  0950   ALTSGPT 10   ASTSGOT 16   ALKPHOSPHAT 148*   TBILIRUBIN 0.5   GLUCOSE 92     Recent Labs     08/18/24  0950   APTT 31.1   INR 1.01     No results for input(s): \"NTPROBNP\" in the last 72 hours.      Recent Labs     08/18/24  0950   TROPONINT <6       Imaging:  CT-CTA NECK WITH & W/O-POST PROCESSING   Final Result      1.  Moderate 50-70% proximal left ICA stenosis.   2.  Moderate plaque at the right carotid bifurcation and bulb with less than 50% stenosis.   3.  Mild to moderate bilateral vertebral artery ostial stenosis.      CT-CTA HEAD WITH & W/O-POST PROCESS   Final Result      1.  Patent distal left internal carotid artery stent.   2.  Significant artifact from right distal ICA and right MCA bifurcation aneurysm coil masses and distal right M1 segment stent significantly degraded evaluation.   3.  No definite acute large vessel occlusion.      CT-CEREBRAL PERFUSION ANALYSIS   Final Result      1. Cerebral blood flow less than 30% possibly representing completed infarct = 0 mL.   2. T Max more than 6 seconds possibly representing combination of completed infarct and ischemia = 0 mL.   3. Mismatched volume possibly representing ischemic brain/penumbra= 0 mL      4.  Please note that this cerebral perfusion study and report is Quantitative and targets supratentorial (cerebral) perfusion for evaluation of large vessel territory acute ischemia/infarction. For example, lacunar infarcts, and brainstem/posterior fossa    ischemia/infarction are not evaluated on this study.  Data acquisition is subject to artifacts which can yield non-anatomically plausible perfusion maps which may be due to motion, bolus timing, signal to noise ratio, or other technical " "factors.    Perfusion map abnormalities which show non-anatomic distributions are likely artifact.   This study is not \"stand-alone\" and should only be utilized for diagnosis, management/treatment in correlation with CT, CTA, and/or MRI and clinical factors.         DX-CHEST-PORTABLE (1 VIEW)   Final Result      No acute process.          X-Ray:  I have personally reviewed the images and compared with prior images.  EKG:  I have personally reviewed the images and compared with prior images.    Assessment/Plan:  Justification for Admission Status  I anticipate this patient will require at least two midnights for appropriate medical management, necessitating inpatient admission because workup of left upper extremity sensation changes including stroke rule out    Patient will need a Telemetry bed on EMERGENCY service .  The need is secondary to possible stroke.    * Left upper extremity numbness- (present on admission)  Assessment & Plan  CT/CTA head and neck for acute process.    Neurology consulted.    Admit to tele  Neuro checks  MRI ordered.    Alzheimer's dementia (HCC)- (present on admission)  Assessment & Plan  Mild  Please discuss medical decisions with     Aneurysm of left internal carotid artery- (present on admission)  Assessment & Plan  On plavix    Aneurysm of middle cerebral artery- (present on admission)  Assessment & Plan  S/p coiling    Reactive depression- (present on admission)  Assessment & Plan  paxil        VTE prophylaxis: SCDs/TEDs and enoxaparin ppx  "

## 2024-08-18 NOTE — ED NOTES
Patient and spouse requesting an update on bed status, provided with an update, spouse requesting RN to speak to them.

## 2024-08-18 NOTE — ED NOTES
Medication Reconciliation    Medication reconciliation is complete per patient reporting.  - Allergies reviewed.  - No outpatient antibiotics in the last 30 days.  - No anticoagulants in the last 14 days.  - Patient's home pharmacy is [facility] on [facility] [phone].    Eleonora Wagoner, Pharmacy Intern

## 2024-08-19 ENCOUNTER — PHARMACY VISIT (OUTPATIENT)
Dept: PHARMACY | Facility: MEDICAL CENTER | Age: 74
End: 2024-08-19
Payer: COMMERCIAL

## 2024-08-19 VITALS
TEMPERATURE: 97.9 F | BODY MASS INDEX: 17.16 KG/M2 | WEIGHT: 93.25 LBS | OXYGEN SATURATION: 94 % | DIASTOLIC BLOOD PRESSURE: 63 MMHG | HEART RATE: 70 BPM | SYSTOLIC BLOOD PRESSURE: 117 MMHG | RESPIRATION RATE: 18 BRPM | HEIGHT: 62 IN

## 2024-08-19 LAB
ALBUMIN SERPL BCP-MCNC: 4.1 G/DL (ref 3.2–4.9)
BASOPHILS # BLD AUTO: 0.4 % (ref 0–1.8)
BASOPHILS # BLD: 0.03 K/UL (ref 0–0.12)
BUN SERPL-MCNC: 10 MG/DL (ref 8–22)
CALCIUM ALBUM COR SERPL-MCNC: 9 MG/DL (ref 8.5–10.5)
CALCIUM SERPL-MCNC: 9.1 MG/DL (ref 8.5–10.5)
CHLORIDE SERPL-SCNC: 106 MMOL/L (ref 96–112)
CHOLEST SERPL-MCNC: 398 MG/DL (ref 100–199)
CO2 SERPL-SCNC: 20 MMOL/L (ref 20–33)
CREAT SERPL-MCNC: 0.5 MG/DL (ref 0.5–1.4)
EOSINOPHIL # BLD AUTO: 0.1 K/UL (ref 0–0.51)
EOSINOPHIL NFR BLD: 1.2 % (ref 0–6.9)
ERYTHROCYTE [DISTWIDTH] IN BLOOD BY AUTOMATED COUNT: 48.1 FL (ref 35.9–50)
EST. AVERAGE GLUCOSE BLD GHB EST-MCNC: 120 MG/DL
GFR SERPLBLD CREATININE-BSD FMLA CKD-EPI: 97 ML/MIN/1.73 M 2
GLUCOSE SERPL-MCNC: 98 MG/DL (ref 65–99)
HBA1C MFR BLD: 5.8 % (ref 4–5.6)
HCT VFR BLD AUTO: 43.3 % (ref 37–47)
HDLC SERPL-MCNC: 53 MG/DL
HGB BLD-MCNC: 14.6 G/DL (ref 12–16)
IMM GRANULOCYTES # BLD AUTO: 0.02 K/UL (ref 0–0.11)
IMM GRANULOCYTES NFR BLD AUTO: 0.2 % (ref 0–0.9)
LDLC SERPL CALC-MCNC: 298 MG/DL
LYMPHOCYTES # BLD AUTO: 1.96 K/UL (ref 1–4.8)
LYMPHOCYTES NFR BLD: 23.5 % (ref 22–41)
MAGNESIUM SERPL-MCNC: 2.1 MG/DL (ref 1.5–2.5)
MCH RBC QN AUTO: 31.4 PG (ref 27–33)
MCHC RBC AUTO-ENTMCNC: 33.7 G/DL (ref 32.2–35.5)
MCV RBC AUTO: 93.1 FL (ref 81.4–97.8)
MONOCYTES # BLD AUTO: 0.57 K/UL (ref 0–0.85)
MONOCYTES NFR BLD AUTO: 6.8 % (ref 0–13.4)
NEUTROPHILS # BLD AUTO: 5.66 K/UL (ref 1.82–7.42)
NEUTROPHILS NFR BLD: 67.9 % (ref 44–72)
NRBC # BLD AUTO: 0 K/UL
NRBC BLD-RTO: 0 /100 WBC (ref 0–0.2)
PHOSPHATE SERPL-MCNC: 3.9 MG/DL (ref 2.5–4.5)
PLATELET # BLD AUTO: 307 K/UL (ref 164–446)
PMV BLD AUTO: 9.5 FL (ref 9–12.9)
POTASSIUM SERPL-SCNC: 4.1 MMOL/L (ref 3.6–5.5)
RBC # BLD AUTO: 4.65 M/UL (ref 4.2–5.4)
SODIUM SERPL-SCNC: 140 MMOL/L (ref 135–145)
TRIGL SERPL-MCNC: 233 MG/DL (ref 0–149)
WBC # BLD AUTO: 8.3 K/UL (ref 4.8–10.8)

## 2024-08-19 PROCEDURE — 97162 PT EVAL MOD COMPLEX 30 MIN: CPT

## 2024-08-19 PROCEDURE — 80069 RENAL FUNCTION PANEL: CPT

## 2024-08-19 PROCEDURE — 97535 SELF CARE MNGMENT TRAINING: CPT

## 2024-08-19 PROCEDURE — 85025 COMPLETE CBC W/AUTO DIFF WBC: CPT

## 2024-08-19 PROCEDURE — 700102 HCHG RX REV CODE 250 W/ 637 OVERRIDE(OP): Performed by: INTERNAL MEDICINE

## 2024-08-19 PROCEDURE — A9270 NON-COVERED ITEM OR SERVICE: HCPCS | Performed by: INTERNAL MEDICINE

## 2024-08-19 PROCEDURE — 99232 SBSQ HOSP IP/OBS MODERATE 35: CPT | Performed by: NURSE PRACTITIONER

## 2024-08-19 PROCEDURE — 83735 ASSAY OF MAGNESIUM: CPT

## 2024-08-19 PROCEDURE — 99406 BEHAV CHNG SMOKING 3-10 MIN: CPT | Performed by: INTERNAL MEDICINE

## 2024-08-19 PROCEDURE — RXMED WILLOW AMBULATORY MEDICATION CHARGE: Performed by: INTERNAL MEDICINE

## 2024-08-19 PROCEDURE — 80061 LIPID PANEL: CPT

## 2024-08-19 PROCEDURE — 83036 HEMOGLOBIN GLYCOSYLATED A1C: CPT

## 2024-08-19 PROCEDURE — 97166 OT EVAL MOD COMPLEX 45 MIN: CPT

## 2024-08-19 PROCEDURE — 99239 HOSP IP/OBS DSCHRG MGMT >30: CPT | Mod: 25 | Performed by: INTERNAL MEDICINE

## 2024-08-19 RX ORDER — ASPIRIN 81 MG/1
81 TABLET ORAL DAILY
COMMUNITY
Start: 2024-08-19 | End: 2024-08-29

## 2024-08-19 RX ORDER — GAUZE BANDAGE 2" X 2"
100 BANDAGE TOPICAL DAILY
Status: DISCONTINUED | OUTPATIENT
Start: 2024-08-19 | End: 2024-08-19 | Stop reason: HOSPADM

## 2024-08-19 RX ORDER — UREA 10 %
1000 LOTION (ML) TOPICAL DAILY
Status: DISCONTINUED | OUTPATIENT
Start: 2024-08-19 | End: 2024-08-19 | Stop reason: HOSPADM

## 2024-08-19 RX ORDER — LANOLIN ALCOHOL/MO/W.PET/CERES
100 CREAM (GRAM) TOPICAL DAILY
COMMUNITY
Start: 2024-08-19

## 2024-08-19 RX ORDER — ATORVASTATIN CALCIUM 80 MG/1
80 TABLET, FILM COATED ORAL EVERY EVENING
Qty: 30 TABLET | Refills: 0 | Status: SHIPPED | OUTPATIENT
Start: 2024-08-19

## 2024-08-19 RX ORDER — ATORVASTATIN CALCIUM 80 MG/1
80 TABLET, FILM COATED ORAL EVERY EVENING
Status: DISCONTINUED | OUTPATIENT
Start: 2024-08-19 | End: 2024-08-19 | Stop reason: HOSPADM

## 2024-08-19 RX ORDER — ASPIRIN 81 MG/1
81 TABLET ORAL DAILY
Status: DISCONTINUED | OUTPATIENT
Start: 2024-08-19 | End: 2024-08-19

## 2024-08-19 RX ORDER — ASPIRIN 81 MG/1
81 TABLET ORAL DAILY
Status: DISCONTINUED | OUTPATIENT
Start: 2024-08-19 | End: 2024-08-19 | Stop reason: HOSPADM

## 2024-08-19 RX ADMIN — CLOPIDOGREL BISULFATE 75 MG: 75 TABLET ORAL at 04:19

## 2024-08-19 RX ADMIN — PAROXETINE HYDROCHLORIDE 10 MG: 20 TABLET, FILM COATED ORAL at 04:20

## 2024-08-19 ASSESSMENT — COGNITIVE AND FUNCTIONAL STATUS - GENERAL
STANDING UP FROM CHAIR USING ARMS: A LITTLE
HELP NEEDED FOR BATHING: A LITTLE
SUGGESTED CMS G CODE MODIFIER DAILY ACTIVITY: CI
CLIMB 3 TO 5 STEPS WITH RAILING: A LITTLE
DAILY ACTIVITIY SCORE: 23
MOVING TO AND FROM BED TO CHAIR: A LITTLE
WALKING IN HOSPITAL ROOM: A LITTLE
SUGGESTED CMS G CODE MODIFIER MOBILITY: CJ
MOBILITY SCORE: 20

## 2024-08-19 ASSESSMENT — ACTIVITIES OF DAILY LIVING (ADL): TOILETING: INDEPENDENT

## 2024-08-19 ASSESSMENT — GAIT ASSESSMENTS
DISTANCE (FEET): 200
GAIT LEVEL OF ASSIST: SUPERVISED

## 2024-08-19 ASSESSMENT — FIBROSIS 4 INDEX: FIB4 SCORE: 1.34

## 2024-08-19 NOTE — PROGRESS NOTES
"Neurology Progress Note  Vascular Neurology Service, [facility] Neurosciences    Referring Physician: Chay Villasenor D.O.    Chief Complaint   Patient presents with    Weakness     Px states she thinks she had a stroke around 8pm last night, unsure how long the symptoms lasted. C/o numbness to the back of her L arm and L flank area. L arm is weaker upon assessment. Px  states px was still \"wobbly\" this AM so they decided to come in.        HPI: Refer to initial documented Neurology H&P, as detailed in the patient's chart.    Interval History 08/19/2024: No acute events overnight.MRI brain completed and revealed a small acute infarct in the right thalamus.    Review of systems: In addition to what is detailed in the HPI and/or updated in the interval history, all other systems reviewed and are negative.    Past Medical History:    has a past medical history of Acute nasopharyngitis, Blood clotting disorder (HCC) (2016/2017), Contact dermatitis due to cosmetics (9/16/2015), Environmental and seasonal allergies, Guillain Barré syndrome (HCC), Hemorrhagic disorder ([facility]), History of kidney stones, History of seizures, Hyperlipidemia, Hypertension, Intracranial aneurysm, PAD (peripheral artery disease) (5/7/2014), Psychiatric problem, Reactive depression (9/17/2013), Renal disorder, and Seizure (HCC) (1988).    FHx:  family history includes Hyperlipidemia in her sister; Lung Disease in her sister; Other in her paternal grandfather and sister.    SHx:   reports that she has been smoking cigarettes. She has a 25 pack-year smoking history. She has never used smokeless tobacco. She reports that she does not currently use alcohol after a past usage of about 0.6 oz of alcohol per week. She reports that she does not use drugs.    Medications:    Current Facility-Administered Medications:     clopidogrel (Plavix) tablet 75 mg, 75 mg, Oral, DAILY, Chay Villasenor D.O., 75 mg at 08/19/24 0419    PARoxetine (Paxil) " tablet 10 mg, 10 mg, Oral, DAILY, LENCHO SoodOJulia, 10 mg at 08/19/24 0420    enoxaparin (Lovenox) inj 30 mg, 30 mg, Subcutaneous, DAILY AT 1800, Chay Villasenor D.O.    acetaminophen (Tylenol) tablet 650 mg, 650 mg, Oral, Q6HRS PRN, Chay Villasenor D.O.    ondansetron (Zofran) syringe/vial injection 4 mg, 4 mg, Intravenous, Q4HRS PRN, Chay Villasenor D.O.    ondansetron (Zofran ODT) dispertab 4 mg, 4 mg, Oral, Q4HRS PRN, Chay Villasenor D.O.    nicotine (Nicoderm) 14 MG/24HR 14 mg, 14 mg, Transdermal, Daily-0600, 14 mg at 08/18/24 1303 **AND** Nicotine Replacement Patient Education Materials, , , Once **AND** nicotine polacrilex (Nicorette) 2 MG piece 2 mg, 2 mg, Oral, Q HOUR PRN, Chay Villasenor D.O.    Physical Examination:     Vitals:    08/18/24 1713 08/18/24 2003 08/18/24 2310 08/19/24 0328   BP:  (!) 142/86 138/70 119/61   Pulse:  84 67 70   Resp:  18 18 18   Temp:  36.7 °C (98.1 °F) 36.2 °C (97.2 °F) 36.5 °C (97.7 °F)   TempSrc:  Temporal Temporal Temporal   SpO2:  93% 94% 92%   Weight: 43.8 kg (96 lb 9 oz)   42.3 kg (93 lb 4.1 oz)   Height:           General: Patient is awake and in no acute distress  Eyes: examination of optic disks not indicated at this time  CV: RRR    NEUROLOGICAL EXAM:     Mental status: Awake, alert and fully oriented, follows commands  Speech and language: speech is not dysarthric. The patient is able to name and repeat.  Cranial nerve exam: Pupils are equal, round and reactive to light bilaterally. Visual fields are full. Extraocular muscles are intact. Sensation in the face is intact to light touch. Face is symmetric. Hearing to finger rub equal. Palate elevates symmetrically. Shoulder shrug is full. Tongue is midline.  Motor exam: Strength is 5/5 in all extremities both distally and proximally. Tone is normal. No abnormal movements were seen on exam.  Sensory exam: No sensory deficits identified   Deep tendon reflexes: 2+ and symmetric. Toes down-going  bilaterally.  Coordination: no ataxia   Gait: deferred given patient preference    Objective Data:    Labs:  Lab Results   Component Value Date/Time    PROTHROMBTM 13.4 08/18/2024 09:50 AM    INR 1.01 08/18/2024 09:50 AM      Lab Results   Component Value Date/Time    WBC 8.4 08/18/2024 09:50 AM    RBC 4.29 08/18/2024 09:50 AM    HEMOGLOBIN 13.3 08/18/2024 09:50 AM    HEMATOCRIT 40.7 08/18/2024 09:50 AM    MCV 94.9 08/18/2024 09:50 AM    MCH 31.0 08/18/2024 09:50 AM    MCHC 32.7 08/18/2024 09:50 AM    MPV 9.3 08/18/2024 09:50 AM    NEUTSPOLYS 71.10 08/18/2024 09:50 AM    LYMPHOCYTES 21.00 (L) 08/18/2024 09:50 AM    MONOCYTES 6.30 08/18/2024 09:50 AM    EOSINOPHILS 0.90 08/18/2024 09:50 AM    BASOPHILS 0.50 08/18/2024 09:50 AM      Lab Results   Component Value Date/Time    SODIUM 140 08/18/2024 09:50 AM    POTASSIUM 4.3 08/18/2024 09:50 AM    CHLORIDE 107 08/18/2024 09:50 AM    CO2 21 08/18/2024 09:50 AM    GLUCOSE 92 08/18/2024 09:50 AM    BUN 12 08/18/2024 09:50 AM    CREATININE 0.46 (L) 08/18/2024 09:50 AM      Lab Results   Component Value Date/Time    CHOLSTRLTOT 373 (H) 04/27/2023 11:07 AM     (H) 04/27/2023 11:07 AM    HDL 56 04/27/2023 11:07 AM    TRIGLYCERIDE 253 (H) 04/27/2023 11:07 AM       Lab Results   Component Value Date/Time    ALKPHOSPHAT 148 (H) 08/18/2024 09:50 AM    ASTSGOT 16 08/18/2024 09:50 AM    ALTSGPT 10 08/18/2024 09:50 AM    TBILIRUBIN 0.5 08/18/2024 09:50 AM        Imaging/Testing:    I interpreted and/or reviewed the patient's neuroimaging    CT-CTA NECK WITH & W/O-POST PROCESSING   Final Result      1.  Moderate 50-70% proximal left ICA stenosis.   2.  Moderate plaque at the right carotid bifurcation and bulb with less than 50% stenosis.   3.  Mild to moderate bilateral vertebral artery ostial stenosis.      CT-CTA HEAD WITH & W/O-POST PROCESS   Final Result      1.  Patent distal left internal carotid artery stent.   2.  Significant artifact from right distal ICA and right MCA  "bifurcation aneurysm coil masses and distal right M1 segment stent significantly degraded evaluation.   3.  No definite acute large vessel occlusion.      CT-CEREBRAL PERFUSION ANALYSIS   Final Result      1. Cerebral blood flow less than 30% possibly representing completed infarct = 0 mL.   2. T Max more than 6 seconds possibly representing combination of completed infarct and ischemia = 0 mL.   3. Mismatched volume possibly representing ischemic brain/penumbra= 0 mL      4.  Please note that this cerebral perfusion study and report is Quantitative and targets supratentorial (cerebral) perfusion for evaluation of large vessel territory acute ischemia/infarction. For example, lacunar infarcts, and brainstem/posterior fossa    ischemia/infarction are not evaluated on this study.  Data acquisition is subject to artifacts which can yield non-anatomically plausible perfusion maps which may be due to motion, bolus timing, signal to noise ratio, or other technical factors.    Perfusion map abnormalities which show non-anatomic distributions are likely artifact.   This study is not \"stand-alone\" and should only be utilized for diagnosis, management/treatment in correlation with CT, CTA, and/or MRI and clinical factors.         DX-CHEST-PORTABLE (1 VIEW)   Final Result      No acute process.      MR-BRAIN-W/O    (Results Pending)   MR-MRA HEAD-W/O    (Results Pending)       Assessment and Plan:    74 y.o. F with extensive medical history to include intracranial coils, Right M1 stent, and left ICA stent who presented with transient left flank and left back of arm paraesthesias. Symptoms have resolved prior to arrival in the ED. NCCTH was negative for acute intracranial abnormalities. CTA head and neck were negative for LVO, dissection, and critical flow limiting stenoses, demonstrates known intracranial coils, a patent left ICA stent, there is significant artifact from the coils degrading evaluation of the right M1 stent. CT " Perfusion is negative for CBF defect. MRI brain reveals an acute lacunar infarct in the thalamus. Recommend addition of ASA 81mg daily to her Plavix 75mg daily, for a total of 10 days (end of treatment 08/29/2024). Resume Plavix monotherapy thereafter. Will need to be optimized with high intensity statin therapy. Discussed smoking cessation with patient.     Right thalamic infarct  Intracranial coiling and stents  HLD  Tobacco Dependence    Plan  -q4h and PRN neuro assessment. VS per nursing/unit protocol.   -BP goal is normotension, 100-130/60-80. Avoid hypotension.    -Telemetry- May defer to TTE due to small vessel infarct  -Check Stroke Labs: Lipid panel and HgbA1c  -DAPT with ASA 81mg daily and Plavix 75mg daily x 10 days (end of treatment 08/29/2024). Plavix 75mg monotherapy thereafter  -Atorvastatin 80 mg PO q HS. Titrate to long term LDL goal < 70  -BG management per primary team. BG goal . A1C goal <7  -PT/OT/SLP eval and treat.   -DVT PPX: SCDs, okay for chemoppx from neurology perspective  -[facility] follow up     Case reviewed and plan created with Dr. Guille Coulter, Vascular Neurology. Neurology will sign off at this time. Please call with any questions.      CAPRICE Underwood.  Vascular Neurology, Acute Care Services

## 2024-08-19 NOTE — DISCHARGE PLANNING
Care Transition Team Final Discharge Disposition    Actual Discharge Information  Discharge Disposition: Discharged to home/self care (01)    Pt is discharging home today. PTt and Pt's  is refusing HH. RN CM informed Pt that if ever they would think they need it, they can always reach out to their PCP. Pt and pt's  verbalizes understanding. Dr. Sharif and bed side nurse informed.

## 2024-08-19 NOTE — PROGRESS NOTES
Monitor summary: SR 66-75, FL -0.14, QRS -0.06, QT -0.38, per strip from the monitor room.

## 2024-08-19 NOTE — PROGRESS NOTES
4 Eyes Skin Assessment Completed by Abdirashid RN and Cesar RN.    Head WDL  Ears Redness and Blanching  Nose WDL  Mouth WDL  Neck WDL  Breast/Chest WDL  Shoulder Blades WDL  Spine WDL  (R) Arm/Elbow/Hand WDL  (L) Arm/Elbow/Hand WDL  Abdomen WDL  Groin WDL  Scrotum/Coccyx/Buttocks WDL  (R) Leg WDL  (L) Leg WDL  (R) Heel/Foot/Toe Redness and Blanching  (L) Heel/Foot/Toe Redness and Blanching          Devices In Places Tele Box and Pulse Ox      Interventions In Place Pillows    Possible Skin Injury No    Pictures Uploaded Into Epic N/A  Wound Consult Placed N/A  RN Wound Prevention Protocol Ordered No

## 2024-08-19 NOTE — THERAPY
Physical Therapy   Initial Evaluation     Patient Name: Reina Gandara  Age:  74 y.o., Sex:  female  Medical Record #: [id number]  Today's Date: 8/19/2024      Assessment  Patient is 74 y.o. female who presented with L sided sensory changes, admitted with acute lacunar infarct in the thalamus.  PMH significant for intracranial coils, R M1 stent, L ICA stent, and mild Alzheimer's dementia.  Pt presented for PT evaluation at/near her functional baseline.  Pt endorsed mild residual numbness in LUE and L flank > LLE.  Pt mobilized as detailed below with SPV without AD.  Noted mild LLE coordination deficit with stepping however functional. Elements of Castillo Balance Test performed with increased difficulty with single limb balance, tandem standing, standing with eyes closed, and reaching forward.  Educated pt on role of acute PT and assessment findings, strategies for safety at home, mobility benefits & expectations s/p CVA, and answered 's questions as able.  No further acute PT needs.    Plan    Physical Therapy Initial Treatment Plan   Duration: Evaluation only    DC Equipment Recommendations: None  Discharge Recommendations: Anticipate that the patient will have no further physical therapy needs after discharge from the hospital     Objective     08/19/24 0934   Pain 0 - 10 Group   Therapist Pain Assessment Post Activity Pain Same as Prior to Activity;Nurse Notified;0   Prior Living Situation   Prior Services Home-Independent   Housing / Facility 2 Story House (main level + daylight basement)   Steps Into Home 3   Steps In Home (flight of stairs to basement)   Rail Left Rail  (Steps into Home);Both Rail (Steps in Home)   Equipment Owned Front-Wheel Walker;Single Point Cane   Lives with - Patient's Self Care Capacity Spouse   Comments Spouse very supportive and able to assist as needed   Prior Level of Functional Mobility   Bed Mobility Independent   Transfer Status Independent   Ambulation Independent    Ambulation Distance household distances   Assistive Devices Used None   Stairs Independent   Cognition    Cognition / Consciousness WDL   Level of Consciousness Alert   Comments Pleasant & very motivated   Active ROM Lower Body    Active ROM Lower Body  WDL   Strength Lower Body   Lower Body Strength  WDL   Comments BLE 5/5   Sensation Lower Body   Lower Extremity Sensation   WDL   Comments Endorsed mild LLE numbness   Lower Body Muscle Tone   Lower Body Muscle Tone  WDL   Coordination Lower Body    Comments Mildly incoordinated LLE with gait   Vision   Vision Comments Denied visual changes.   Other Treatments   Other Treatments Provided Educated pt on role of acute PT and assessment findings, strategies for safety at home, mobility benefits expectations s/p CVA, and answered 's questions as able.   Balance Assessment   Sitting Balance (Static) Good   Sitting Balance (Dynamic) Good   Standing Balance (Static) Fair +   Standing Balance (Dynamic) Fair   Weight Shift Sitting Good   Weight Shift Standing Good   Bed Mobility    Supine to Sit Supervised   Sit to Supine Supervised   Scooting Supervised   Gait Analysis   Gait Level Of Assist Supervised   Assistive Device None   Distance (Feet) 200   Deviation (mild LOB with head turning; mild LLE incoordination)   # of Stairs Climbed 2   Level of Assist with Stairs Supervised   Weight Bearing Status No restrictions   Functional Mobility   Sit to Stand Supervised   Bed, Chair, Wheelchair Transfer Supervised   Transfer Method Stand Step   Mobility bed mobility, ambulation, stairs   Activity Tolerance   Comments functional   Education Group   Education Provided Role of Physical Therapist   Role of Physical Therapist Patient Response Patient;Acceptance;Explanation;Verbal Demonstration   Physical Therapy Initial Treatment Plan    Duration Evaluation only

## 2024-08-19 NOTE — DISCHARGE PLANNING
HTH/ [facility] TCN chart review completed. Due to low LACE 66 and high 6 click score 24 ADL's and 24 mobility as well as patients currently documented level of function, no TCN needs anticipated in patient discharge planning at this time.  Per chart review, PT recommends no needs and OT recommends no OT needs on 8/19/24.  Per Kardex, patient ambulating 25 feet X 2, no AD.  Should post acute discharge needs arise warranting TCN involvement, please contact TCN team via Voalte. Thank you.

## 2024-08-19 NOTE — DISCHARGE SUMMARY
"Discharge Summary    CHIEF COMPLAINT ON ADMISSION  Chief Complaint   Patient presents with    Weakness     Px states she thinks she had a stroke around 8pm last night, unsure how long the symptoms lasted. C/o numbness to the back of her L arm and L flank area. L arm is weaker upon assessment. Px  states px was still \"wobbly\" this AM so they decided to come in.        Reason for Admission  Weakness     Admission Date  8/18/2024    CODE STATUS  Full Code    HPI & HOSPITAL COURSE  This is a 74 y.o. female here with Weakness (Px states she thinks she had a stroke around 8pm last night, unsure how long the symptoms lasted. C/o numbness to the back of her L arm and L flank area. L arm is weaker upon assessment. Px  states px was still \"wobbly\" this AM so they decided to come in. )       Please review Dr. Chay Villasenor D.O. notes for further details of history of present illness, past medical/social/family histories, allergies and medications. Please review Dr. Price, neurology consultation notes.  73yo BMI 17 frail F actively smoking with mild Alzheimer's dementia, w/ h/o depression, cerebral aneurysm s/p coiling, presented  left upper extremity and L deltoid paresthesias. At the ED afebrile, hypertensive. EKG sinus. CTA head and neck NO LVO, dissection, or critical flow limiting stenoses, demonstrates known intracranial coils, a patent left ICA stent, there is significant artifact from the coils degrading evaluation of the right M1 stent. CT Perfusion is negative for CBF defect. MRI brain reveals an acute lacunar infarct in the thalamus. Neurology consulted. Deferred echo because it is a small vessel infarct. ASA for 10 days, Plavix indefinitely and statin recommended. Patient however has an allergy (swelling) to NSAIDs.  says they have baby aspirin at home and looks like she tolerated it, her NAID allergy was \"a long time ago\" and per patient the reaction was very mild. Nvertheless they want to " "go home and will try the baby aspirin and  will \"watch her closely\". Pharmacy mentioned possible cross ractivity but most of the time patients fo not react. Neurology also had talked with pateint. Ordered vitamins and supplements.     I spent 5 minutes, discussing tobacco dependence and cardiac as well as pulmonary risk. Smoking cessation instructions. Code 89898 She did say its difficult to quit I reassured her. I told her nicotine can increase clot formation and increase risk fo stroke.     At discharge date, Reina Gandara afebrile and hemodynamically stable.  Reina Gandara wanted to be discharged today.    Imaging  MR-MRA HEAD-W/O   Final Result         Stable appearance of previously embolized right ICA and right MCA aneurysms. No definite recurrence is seen.      Small laterally projecting aneurysm measuring 1 to 2 mm arising from the anterior right cavernous ICA January, stable from the previous CTA and MRA.         MR-BRAIN-W/O   Final Result         Subtle area of restricted diffusion involving the posterior limb of the right internal capsule may represent an acute infarct. Please correlate with history and physical exam.      Age-related volume loss and chronic microvascular ischemic changes.      Stable appearance of coil mass within previously embolized intracranial aneurysms.      CT-CTA NECK WITH & W/O-POST PROCESSING   Final Result      1.  Moderate 50-70% proximal left ICA stenosis.   2.  Moderate plaque at the right carotid bifurcation and bulb with less than 50% stenosis.   3.  Mild to moderate bilateral vertebral artery ostial stenosis.      CT-CTA HEAD WITH & W/O-POST PROCESS   Final Result      1.  Patent distal left internal carotid artery stent.   2.  Significant artifact from right distal ICA and right MCA bifurcation aneurysm coil masses and distal right M1 segment stent significantly degraded evaluation.   3.  No definite acute large vessel occlusion.    " "  CT-CEREBRAL PERFUSION ANALYSIS   Final Result      1. Cerebral blood flow less than 30% possibly representing completed infarct = 0 mL.   2. T Max more than 6 seconds possibly representing combination of completed infarct and ischemia = 0 mL.   3. Mismatched volume possibly representing ischemic brain/penumbra= 0 mL      4.  Please note that this cerebral perfusion study and report is Quantitative and targets supratentorial (cerebral) perfusion for evaluation of large vessel territory acute ischemia/infarction. For example, lacunar infarcts, and brainstem/posterior fossa    ischemia/infarction are not evaluated on this study.  Data acquisition is subject to artifacts which can yield non-anatomically plausible perfusion maps which may be due to motion, bolus timing, signal to noise ratio, or other technical factors.    Perfusion map abnormalities which show non-anatomic distributions are likely artifact.   This study is not \"stand-alone\" and should only be utilized for diagnosis, management/treatment in correlation with CT, CTA, and/or MRI and clinical factors.         DX-CHEST-PORTABLE (1 VIEW)   Final Result      No acute process.                Therefore, she is discharged in fair and stable condition to home with close outpatient follow-up.    The patient met 2-midnight criteria for an inpatient stay at the time of discharge.    Discharge Date  8/19/2024    FOLLOW UP ITEMS POST DISCHARGE      DISCHARGE DIAGNOSES  Principal Problem:    Left upper extremity numbness (POA: Yes)  Active Problems:    Reactive depression (POA: Yes)      Overview: [facility]. \"Need to include - controlled with current medications or give       update on current work up/treatment\"      Please update condition     Aneurysm of middle cerebral artery (POA: Yes)    Aneurysm of left internal carotid artery (POA: Yes)    Alzheimer's dementia (HCC) (Chronic) (POA: Yes)        FOLLOW UP  No future appointments.  Maria Eugenia Ceballos, A.P.N.  [id number] " [address] [address]  [phone]          Neurology    Follow up  You will receive a call once referral is processed    Follow up with Cristin Gale M.D. in 1 week. Can do outpatient echo for completeness. FOllow up with Neurology or stroke clinic in 1 week NURSING provide resources/pamphlets for Stroke, smoking (NO MORE SMOKING)  MEDICATIONS ON DISCHARGE     Medication List        START taking these medications        Instructions   aspirin 81 MG EC tablet   Take 1 Tablet by mouth every day for 10 days.  Dose: 81 mg     atorvastatin 80 MG tablet  Commonly known as: Lipitor   Take 1 Tablet by mouth every evening.  Dose: 80 mg     cyanocobalamin 1000 MCG Tabs  Commonly known as: Vitamin B12   Take 1 Tablet by mouth every day.  Dose: 1,000 mcg     multivitamin Tabs   Take 1 Tablet by mouth every day.  Dose: 1 Tablet     thiamine 100 MG tablet  Commonly known as: Thiamine   Take 1 Tablet by mouth every day.  Dose: 100 mg            CONTINUE taking these medications        Instructions   acetaminophen 500 MG Tabs  Commonly known as: Tylenol   Take 500 mg by mouth 1 time a day as needed for Mild Pain or Moderate Pain.  Dose: 500 mg     B COMPLEX B-12 PO   Take  by mouth.     clopidogrel 75 MG Tabs  Commonly known as: Plavix   Take 1 Tablet by mouth every day.  Dose: 75 mg     D-3-5 PO   Take 2,000-6,000 Units by mouth every day.  Dose: 2,000-6,000 Units     fish oil 1000 MG Caps capsule   Take 2,000-3,000 mg by mouth every day.  Dose: 2,000-3,000 mg     PARoxetine 10 MG Tabs  Commonly known as: Paxil   Take 1 Tablet by mouth every day. with food  Dose: 10 mg     therapeutic multivitamin-minerals Tabs   Take 1 Tab by mouth every day.  Dose: 1 Tablet     Vitamin C CR 1000 MG Tbcr   Take 1,000-2,000 mg by mouth every day.  Dose: 1,000-2,000 mg     vitamin e 400 UNIT Caps  Commonly known as: Vitamin E   Take 800 Units by mouth every day.  Dose: 800 Units              Allergies  Allergies    Allergen Reactions    Bee Anaphylaxis    Nsaids Swelling     ADVIL  2 trips to ER w reactions of rash swelling with difficulty breathing.    Codeine Nausea     dizzy    Keflex Vomiting     No trouble with amoxil       DIET  Orders Placed This Encounter   Procedures    Diet Order Diet: Regular     Standing Status:   Standing     Number of Occurrences:   1     Order Specific Question:   Diet:     Answer:   Regular [1]       ACTIVITY  Avoid heavy lifting or strenuous activity      CONSULTATIONS  Neuro    PROCEDURES      LABORATORY  Lab Results   Component Value Date    SODIUM 140 08/19/2024    POTASSIUM 4.1 08/19/2024    CHLORIDE 106 08/19/2024    CO2 20 08/19/2024    GLUCOSE 98 08/19/2024    BUN 10 08/19/2024    CREATININE 0.50 08/19/2024        Lab Results   Component Value Date    WBC 8.3 08/19/2024    HEMOGLOBIN 14.6 08/19/2024    HEMATOCRIT 43.3 08/19/2024    PLATELETCT 307 08/19/2024        Total time of the discharge process exceeds 50 minutes.

## 2024-08-19 NOTE — CARE PLAN
The patient is Stable - Low risk of patient condition declining or worsening    Shift Goals  Clinical Goals: neuro monitoring and safety  Patient Goals: go home  Family Goals: updates    Progress made toward(s) clinical / shift goals:    Problem: Neuro Status  Goal: Neuro status will remain stable or improve  Outcome: Progressing  Note: Neuro checks q 4 and NIH stroke scale in use. Patient A&O x 4. FSI. NIH score of 2.      Problem: Hemodynamic Monitoring  Goal: Patient's hemodynamics, fluid balance and neurologic status will be stable or improve  Outcome: Progressing  Note: BP monitored to maintain systolic BP goal < 180.        Patient is not progressing towards the following goals: N/A

## 2024-08-19 NOTE — THERAPY
Occupational Therapy   Initial Evaluation     Patient Name: Reina Gandara  Age:  74 y.o., Sex:  female  Medical Record #: [id number]  Today's Date: 8/19/2024    Precautions: Fall Risk    Assessment  Patient is 74 y.o. female admitted with altered sensation in LUE and L flank area. MRI brain reveals an acute lacunar infarct in the thalamus. PMHx of intracranial coils, R M1 stent, L ICA stent, and mild Alzheimer's Dementia. Pt seen for OT eval and tx. Pt currently resides with her  in a 2-story house and was independent with ADLs and IADLs.     During OT eval, pt demo ability to complete ADLs, functional mobility, and txfs with supv and no AD. Pt with no LOB. Pt presented with mild strength, coordination, and sensation deficits in LUE, but did not interfere with ability to safely complete ADLs. Pt reports that LUE are continuing to resolve. Pt ans spouse provided education on role of acute OT, stroke awareness, energy conservation, home safety, and compensatory strategies to safely complete ADLs. Pt and spouse reported no additional concerns regarding ability to safely complete ADLs and txfs after DC. Pt has no further acute OT needs and anticipate she will have no further OT needs after DC.     Patient will not be actively followed for occupational therapy services at this time, however may be seen if requested by physician for 1 more visit within 30 days to address any discharge or equipment needs.     Plan    Occupational Therapy Initial Treatment Plan   Duration: Discharge Needs Only    DC Equipment Recommendations: Tub Transfer Bench, Grab Bar(s) by Toilet, Grab Bar(s) in Tub / Shower  Discharge Recommendations: Anticipate that the patient will have no further occupational therapy needs after discharge from the hospital      Objective      Prior Living Situation   Prior Services Home-Independent   Housing / Facility 2 Story House  (Main level with daylight basement)   Steps Into Home 3   Steps  In Home   (Full flight into basement)   Rail Left Rail  (Steps into Home);Both Rail (Steps in Home)   Bathroom Set up Bathtub / Shower Combination   Equipment Owned Front-Wheel Walker;Single Point Cane   Lives with - Patient's Self Care Capacity Spouse   Comments Pt currently resides with her  who is able to provide assist as needed.   Prior Level of ADL Function   Self Feeding Independent   Grooming / Hygiene Independent   Bathing Independent   Dressing Independent   Toileting Independent   Prior Level of IADL Function   Medication Management Independent   Laundry Independent   Kitchen Mobility Independent   Finances Independent   Home Management Independent   Shopping Independent   Prior Level Of Mobility Independent Without Device in Community;Independent Without Device in Home   Driving / Transportation Relatives / Others Provide Transportation   Occupation (Pre-Hospital Vocational) Retired Due To Age   Leisure Interests Gardening;Other (Comments);Exercise  (Household tasks)   Precautions   Precautions Fall Risk   Vitals   O2 Delivery Device None - Room Air   Pain 0 - 10 Group   Therapist Pain Assessment Post Activity Pain Same as Prior to Activity;Nurse Notified  (Not rated, agreeable to activity)   Cognition    Cognition / Consciousness WDL   Level of Consciousness Alert   Comments Pleasant, cooperative, and receptive to education   Active ROM Upper Body   Active ROM Upper Body  WDL   Dominant Hand Right   Strength Upper Body   Comments RUE 5/5, LUE grossly 4+/5   Sensation Upper Body   Upper Extremity Sensation  WDL   Comments C/o mild numbness in LUE. Able to appropriately identify light touch with vision occluded   Coordination Upper Body   Comments RUE WFl; demo slight delay in LUE with finger opposition, finger to nose, rapid alternating hand movements   Balance Assessment   Sitting Balance (Static) Good   Sitting Balance (Dynamic) Good   Standing Balance (Static) Fair +   Standing Balance  (Dynamic) Fair   Weight Shift Sitting Good   Weight Shift Standing Good   Comments No AD, no LOB   Bed Mobility    Supine to Sit Supervised   Sit to Supine Supervised   Scooting Supervised   Comments HOB slightly elevated, pt transitioned in to long sit position before pivoting over   ADL Assessment   Eating Supervision   Grooming Supervision;Standing   Lower Body Dressing Supervision  (Demo abiity to tailor sit to manage socks; had previously donned personal underwear)   Toileting Supervision  (Able to complete clothing management and peircare)   How much help from another person does the patient currently need...   6 Clicks Daily Activity Score 23   mRS Prior to admission   Prior to admission mRS 0   Modified [place] (mRS)   Modified [place] Score 0   Functional Mobility   Sit to Stand Supervised   Bed, Chair, Wheelchair Transfer Supervised   Toilet Transfers Supervised  (Able to complete without use of grab bars)   Mobility Functional mobility in room, no AD, no LOB   Visual Perception   Comments No c/o of visual changes   Activity Tolerance   Comments Functional for self care ADLs, no overt signs of WOB or fatigue   Short Term Goals   Short Term Goal # 1 Pt will have no further acute OT needs at [place]   Education Group   Education Provided Energy Conservation;Home Safety;Stroke;Role of Occupational Therapist;Activities of Daily Living   Role of Occupational Therapist Patient Response Patient;Significant Other;Acceptance;Explanation;Verbal Demonstration   Energy Conservation Patient Response Patient;Significant Other;Acceptance;Explanation;Verbal Demonstration   Home Safety Patient Response Patient;Significant Other;Acceptance;Explanation;Verbal Demonstration   Stroke Patient Response Patient;Significant Other;Acceptance;Explanation;Verbal Demonstration   ADL Patient Response Patient;Significant Other;Acceptance;Explanation;Verbal Demonstration

## 2024-08-20 ENCOUNTER — PATIENT OUTREACH (OUTPATIENT)
Dept: MEDICAL GROUP | Facility: PHYSICIAN GROUP | Age: 74
End: 2024-08-20
Payer: MEDICARE

## 2024-08-20 NOTE — PROGRESS NOTES
Transitional Care Management  TCM Outreach Date and Time: Filed (8/20/2024  9:35 AM)    Discharge Questions  Actual Discharge Date: 08/19/24  Now that you are home, how are you feeling?: Fair  Did you receive any new prescriptions?: Yes  Were you able to get them filled?: Yes  Meds to Bed or Pharmacy filled?: Meds to Bed  Do you have any questions about your current medications or new medications (Review Med Rec)?: No  Did you have any durable medical equipment ordered?: No  Do you have a follow up appointment scheduled with your PCP?: No  Was an appointment scheduled for the patient?: Yes  Appointment Date: 09/03/24 (soonest appt dr shine had open.)  Appointment Time: 1500  Any issues or paperwork you wish to discuss with your PCP?: No  Are you (patient) able to get to the appointment?: Yes  If Home Health was ordered, have they contacted you (Patient): Yes  Did you have enough support after your last discharge?: Yes (pt's  is very supportive)  Does this patient qualify for the CCM program?: Yes (chart routed to ccm rn)    Transitional Care  Number of attempts made to contact patient: 1  Current or previous attempts completed within two business days of discharge? : Yes  Provided education regarding treatment plan, medications, self-management, ADLs?: No  Has patient completed an Advanced Directive?: No  Has the Care Manager's phone number provided?: No  Is there anything else I can help you with?: Yes (Please specify) (I reviewed pt's referals)    Discharge Summary  Chief Complaint: weakness  Admitting Diagnosis: weakness  Discharge Diagnosis: Left upper extremity numbness

## 2024-08-23 ENCOUNTER — TELEPHONE (OUTPATIENT)
Dept: HEALTH INFORMATION MANAGEMENT | Facility: OTHER | Age: 74
End: 2024-08-23
Payer: MEDICARE

## 2024-08-23 DIAGNOSIS — F17.200 CURRENT SMOKER: ICD-10-CM

## 2024-08-23 NOTE — TELEPHONE ENCOUNTER
1. Caller Name: Reina Gandara                          Call Back Number: [phone] (home) [phone] (work)        How would the patient prefer to be contacted with a response: [employer] message    2. SPECIFIC Action To Be Taken: Referral pending, please sign.    3. Diagnosis/Clinical Reason for Request: Smoking  Pt sent message via [employer] requesting a referral to a Smoking Cessation Counseling program.  Please let us know if this patient needs appointment for this referral.    4. Specialty & Provider Name/Lab/Imaging Location:     5. Is appointment scheduled for requested order/referral: no    Patient was not informed they will receive a return phone call from the office ONLY if there are any questions before processing their request. Advised to call back if they haven't received a call from the referral department in 5 days.

## 2024-08-27 ENCOUNTER — TELEPHONE (OUTPATIENT)
Dept: HEALTH INFORMATION MANAGEMENT | Facility: OTHER | Age: 74
End: 2024-08-27
Payer: MEDICARE

## 2024-08-27 DIAGNOSIS — Z91.81 AT RISK FOR FALLS: ICD-10-CM

## 2024-08-27 DIAGNOSIS — R26.89 IMBALANCE: ICD-10-CM

## 2024-08-27 DIAGNOSIS — R53.1 WEAKNESS: ICD-10-CM

## 2024-08-27 NOTE — TELEPHONE ENCOUNTER
1. Caller Name: Reina Gandara                          Call Back Number: [phone] (home) [phone] (work)        How would the patient prefer to be contacted with a response: [employer] message    2. SPECIFIC Action To Be Taken: Referral pending, please sign.    3. Diagnosis/Clinical Reason for Request: to help strengthen and with coordination of her left arm as well as balance while walking.      4. Specialty & Provider Name/Lab/Imaging Location: N/A    5. Is appointment scheduled for requested order/referral: no    Patient was not informed they will receive a return phone call from the office ONLY if there are any questions before processing their request. Advised to call back if they haven't received a call from the referral department in 5 days.

## 2024-09-02 NOTE — PROGRESS NOTES
Verbal consent was acquired by the patient to use [employer] ambient listening note generation during this visit Yes     Subjective:     Transitional Care Management  TCM Outreach Date and Time: Filed (8/20/2024  9:35 AM)     Discharge Questions  Actual Discharge Date: 08/19/24  Now that you are home, how are you feeling?: Fair  Did you receive any new prescriptions?: Yes  Were you able to get them filled?: Yes  Meds to Bed or Pharmacy filled?: Meds to Bed  Do you have any questions about your current medications or new medications (Review Med Rec)?: No  Did you have any durable medical equipment ordered?: No  Do you have a follow up appointment scheduled with your PCP?: No  Was an appointment scheduled for the patient?: Yes  Appointment Date: 09/03/24 (soonest appt dr shine had open.)  Appointment Time: 1500  Any issues or paperwork you wish to discuss with your PCP?: No  Are you (patient) able to get to the appointment?: Yes  If Home Health was ordered, have they contacted you (Patient): Yes  Did you have enough support after your last discharge?: Yes (pt's  is very supportive)  Does this patient qualify for the CCM program?: Yes (chart routed to ccm rn)     Transitional Care  Number of attempts made to contact patient: 1  Current or previous attempts completed within two business days of discharge? : Yes  Provided education regarding treatment plan, medications, self-management, ADLs?: No  Has patient completed an Advanced Directive?: No  Has the Care Manager's phone number provided?: No  Is there anything else I can help you with?: Yes (Please specify) (I reviewed pt's referals)     Discharge Summary  Chief Complaint: weakness  Admitting Diagnosis: weakness  Discharge Diagnosis: Left upper extremity numbness          HPI:    History of Present Illness  Reina Gandara is a 74-year-old female who presents for hospital follow-up visit.  The patient was recently admitted from 8/18/2024 to  8/19/2024 after presenting with left upper extremity weakness.  Brain MRI showed subtle area of restricted diffusion involving the posterior limb of the right internal capsule possibly an acute infarct.  CTA neck showed moderate 50 to 70% proximal left ICA stenosis, moderate plaque at the right carotid bifurcation and bulb with less than 50% stenosis, and mild to moderate bilateral vertebral artery ostial stenosis.  CTA of the head showed a patent distal left ICA stent, significant artifact from right distal ICA and right MCA bifurcation aneurysm coil masses and distal right M1 segment stent significantly degrading evaluation, no definite acute large vessel occlusion.  The patient was started on low-dose ASA 81 mg daily to be taken for 10 days in conjunction with her Plavix 75 mg daily.  She was also started on high-dose atorvastatin 80 mg nightly.  She was scheduled for follow-up with neurology, physical therapy, and neurology.  She continues to report left upper extremity weakness and clumsiness.  She also reports a component of spasticity. She has been diagnosed with Alzheimer's disease and is managing it with medication. She occasionally forgets things but compensates by making notes for herself. She is also on medication for depression but has decided to discontinue it despite experiencing periodic depressive episodes. She has expressed a desire to speak with a psychologist. She has been prescribed cholesterol medication, and her  has requested regular blood tests to monitor her cholesterol levels. He is also interested in exploring alternative medications to statins. She is currently participating in a smoking cessation program and has a meeting scheduled for tomorrow. She engages in light exercise, including treadmill workouts. Her  has expressed concern about her diet, particularly her protein intake, and is interested in consulting a nutritionist.        Current medicines (including  reconciliation performed today)  Current Outpatient Medications   Medication Sig Dispense Refill    atorvastatin (LIPITOR) 80 MG tablet Take 1 Tablet by mouth every evening. 30 Tablet 0    cyanocobalamin (VITAMIN B12) 1000 MCG Tab Take 1 Tablet by mouth every day.      multivitamin Tab Take 1 Tablet by mouth every day.      thiamine (THIAMINE) 100 MG tablet Take 1 Tablet by mouth every day. (Patient not taking: Reported on 9/10/2024)      acetaminophen (TYLENOL) 500 MG Tab Take 500 mg by mouth 1 time a day as needed for Mild Pain or Moderate Pain.      clopidogrel (PLAVIX) 75 MG Tab Take 1 Tablet by mouth every day. 100 Tablet 3    PARoxetine (PAXIL) 10 MG Tab Take 1 Tablet by mouth every day. with food 100 Tablet 3    Omega-3 Fatty Acids (FISH OIL) 1000 MG Cap capsule Take 2,000-3,000 mg by mouth every day.      vitamin e (VITAMIN E) 400 UNIT Cap Take 800 Units by mouth every day.      Cholecalciferol (D-3-5 PO) Take 2,000-6,000 Units by mouth every day.      B Complex Vitamins (B COMPLEX B-12 PO) Take  by mouth.      therapeutic multivitamin-minerals (THERAGRAN-M) Tab Take 1 Tab by mouth every day. (Patient not taking: Reported on 9/10/2024)      Ascorbic Acid (VITAMIN C CR) 1000 MG Tab CR Take 1,000-2,000 mg by mouth every day.       No current facility-administered medications for this visit.       Allergies:   Bee, Nsaids, Codeine, and Keflex    Social History     Tobacco Use    Smoking status: Some Days     Current packs/day: 0.50     Average packs/day: 0.5 packs/day for 50.0 years (25.0 ttl pk-yrs)     Types: Cigarettes    Smokeless tobacco: Never   Vaping Use    Vaping status: Never Used   Substance Use Topics    Alcohol use: Not Currently     Alcohol/week: 0.6 oz     Types: 1 Glasses of wine per week    Drug use: No       ROS:  Constitutional: Negative for fever, chills.  Respiratory: Negative for cough and shortness of breath.    Cardiovascular: Negative for chest pain or palpitations.    Objective:  "    Vitals:    09/03/24 1456   BP: 108/64   Pulse: 97   Temp: 36.8 °C (98.3 °F)   TempSrc: Temporal   SpO2: 98%   Weight: 44.7 kg (98 lb 8 oz)   Height: 1.575 m (5' 2\")     Body mass index is 18.02 kg/m².    Physical Exam:  Constitutional: Well-developed, no acute distress.  HEENT: Pupils are equal, round, and reactive to light.   Lungs: Clear to auscultation bilaterally. No wheezes, rhonchi, or rales.   Cardiovascular: Regular rate and rhythm, no murmurs noted.   Extremities: Weakness of her left upper extremity  Psychiatric:  Behavior, mood, and affect are appropriate.    Assessment and Plan:   74-year-old female with the following -    Hospital discharge follow-up  Cerebrovascular accident (CVA), unspecified mechanism (HCC)  Weakness of left upper extremity  Acute condition.  Improving. The patient was recently admitted from 8/18/2024 to 8/19/2024 after presenting with left upper extremity weakness.  Brain MRI showed subtle area of restricted diffusion involving the posterior limb of the right internal capsule possibly an acute infarct.  Additional imaging largely showed stable intracranial aneurysms and stenosis.  The patient was started on low-dose ASA 81 mg daily to be taken for 10 days in conjunction with her Plavix 75 mg daily.  She was also started on high-dose atorvastatin 80 mg nightly.  She states she is taking all of these medications.  She states she was advised to follow-up with neurology but has not been contacted regarding this so I will place a new referral.  She has also been referred to physical therapy for her left upper extremity weakness.  -Continue current regimen of ASA 81 mg daily x 10 days in conjunction with Plavix 75 mg daily and atorvastatin 80 mg nightly  -Referral to stroke clinic placed at today's visit  - Referral to Neurology        - Chart and discharge summary were reviewed.   - Hospitalization and results reviewed with patient.   - Medications reviewed including instructions " regarding high risk medications, dosing and side effects.  - Recommended Services: No services needed at this time  - Advance directive/POLST on file?  No     Follow-up:Return in about 3 months (around 12/3/2024) for 3-month f/u visit.    Face-to-face transitional care management services with [facility] (today's visit is within 14 days post discharge & LACE+ score of 28-58) medical decision complexity were provided.     LACE+ Historical Score Over Time (0-28: Low, 29-58: Medium, 59+: High): 66    Please note that this dictation was created using voice recognition software. I have made every reasonable attempt to correct obvious errors, but I expect that there are errors of grammar and possibly content that I did not discover before finalizing the note.

## 2024-09-03 ENCOUNTER — OFFICE VISIT (OUTPATIENT)
Dept: MEDICAL GROUP | Facility: PHYSICIAN GROUP | Age: 74
End: 2024-09-03
Payer: MEDICARE

## 2024-09-03 VITALS
OXYGEN SATURATION: 98 % | TEMPERATURE: 98.3 F | DIASTOLIC BLOOD PRESSURE: 64 MMHG | WEIGHT: 98.5 LBS | HEART RATE: 97 BPM | BODY MASS INDEX: 18.13 KG/M2 | HEIGHT: 62 IN | SYSTOLIC BLOOD PRESSURE: 108 MMHG

## 2024-09-03 DIAGNOSIS — R29.898 WEAKNESS OF LEFT UPPER EXTREMITY: ICD-10-CM

## 2024-09-03 DIAGNOSIS — Z09 HOSPITAL DISCHARGE FOLLOW-UP: ICD-10-CM

## 2024-09-03 DIAGNOSIS — I63.9 CEREBROVASCULAR ACCIDENT (CVA), UNSPECIFIED MECHANISM (HCC): ICD-10-CM

## 2024-09-03 ASSESSMENT — FIBROSIS 4 INDEX: FIB4 SCORE: 1.25

## 2024-09-06 ENCOUNTER — TELEPHONE (OUTPATIENT)
Dept: HEALTH INFORMATION MANAGEMENT | Facility: OTHER | Age: 74
End: 2024-09-06
Payer: MEDICARE

## 2024-09-07 DIAGNOSIS — R29.898 LEFT ARM WEAKNESS: ICD-10-CM

## 2024-09-07 DIAGNOSIS — R29.898 RIGHT ARM WEAKNESS: ICD-10-CM

## 2024-09-07 DIAGNOSIS — I63.9 CEREBROVASCULAR ACCIDENT (CVA), UNSPECIFIED MECHANISM (HCC): ICD-10-CM

## 2024-09-10 ENCOUNTER — OFFICE VISIT (OUTPATIENT)
Dept: NEUROLOGY | Facility: MEDICAL CENTER | Age: 74
End: 2024-09-10
Attending: PSYCHIATRY & NEUROLOGY
Payer: MEDICARE

## 2024-09-10 VITALS
BODY MASS INDEX: 17.12 KG/M2 | HEIGHT: 62 IN | WEIGHT: 93.03 LBS | SYSTOLIC BLOOD PRESSURE: 102 MMHG | TEMPERATURE: 97.2 F | DIASTOLIC BLOOD PRESSURE: 60 MMHG | HEART RATE: 86 BPM | OXYGEN SATURATION: 98 %

## 2024-09-10 DIAGNOSIS — I63.9 CEREBROVASCULAR ACCIDENT (CVA), UNSPECIFIED MECHANISM (HCC): ICD-10-CM

## 2024-09-10 PROCEDURE — 99215 OFFICE O/P EST HI 40 MIN: CPT | Performed by: PSYCHIATRY & NEUROLOGY

## 2024-09-10 PROCEDURE — 99212 OFFICE O/P EST SF 10 MIN: CPT | Performed by: PSYCHIATRY & NEUROLOGY

## 2024-09-10 PROCEDURE — 3074F SYST BP LT 130 MM HG: CPT | Performed by: PSYCHIATRY & NEUROLOGY

## 2024-09-10 PROCEDURE — 3078F DIAST BP <80 MM HG: CPT | Performed by: PSYCHIATRY & NEUROLOGY

## 2024-09-10 ASSESSMENT — FIBROSIS 4 INDEX: FIB4 SCORE: 1.25

## 2024-09-10 ASSESSMENT — PATIENT HEALTH QUESTIONNAIRE - PHQ9: CLINICAL INTERPRETATION OF PHQ2 SCORE: 0

## 2024-09-10 NOTE — PROGRESS NOTES
Vascular Neurology Clinic Note  [facility]    Referring Physician: Cristin Gale M.D.    HPI: Reina Gandara is a pleasant 74 y.o. right-handed female presenting to stroke bridge clinic for follow up.  She presented to hospital on 8/18/2024 due to left-sided weakness and was found to have small right thalamic infarct.  She was placed on dual antiplatelet therapy with aspirin and Plavix for 10 days followed by Plavix alone afterward which she is compliant with.    She is a current smoker, however attending smoking cessation classes with intent to quit smoking.  Her LDL was 298 and she is on atorvastatin 80 mg daily.  Hemoglobin A1c is 5.8.  She still has some uncomfortable sensation in left upper extremity, however her symptom have improved.    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    Past Medical History:    has a past medical history of Acute nasopharyngitis, Blood clotting disorder (HCC) (2016/2017), Contact dermatitis due to cosmetics (9/16/2015), Environmental and seasonal allergies, Guillain Barré syndrome (HCC), Hemorrhagic disorder (HCC), History of kidney stones, History of seizures, Hyperlipidemia, Hypertension, Intracranial aneurysm, PAD (peripheral artery disease) (5/7/2014), Psychiatric problem, Reactive depression (9/17/2013), Renal disorder, and Seizure (HCC) (1988).    FHx:  family history includes Hyperlipidemia in her sister; Lung Disease in her sister; Other in her paternal grandfather and  sister.    SHx:   reports that she has been smoking cigarettes. She has a 25 pack-year smoking history. She has never used smokeless tobacco. She reports that she does not currently use alcohol after a past usage of about 0.6 oz of alcohol per week. She reports that she does not use drugs.    Allergies:  Allergies   Allergen Reactions    Bee Anaphylaxis    Nsaids Swelling     ADVIL  2 trips to ER w reactions of rash swelling with difficulty breathing.    Codeine Nausea     dizzy    Keflex Vomiting     No trouble with amoxil       Medications:    Current Outpatient Medications:     atorvastatin (LIPITOR) 80 MG tablet, Take 1 Tablet by mouth every evening., Disp: 30 Tablet, Rfl: 0    cyanocobalamin (VITAMIN B12) 1000 MCG Tab, Take 1 Tablet by mouth every day., Disp: , Rfl:     multivitamin Tab, Take 1 Tablet by mouth every day., Disp: , Rfl:     acetaminophen (TYLENOL) 500 MG Tab, Take 500 mg by mouth 1 time a day as needed for Mild Pain or Moderate Pain., Disp: , Rfl:     clopidogrel (PLAVIX) 75 MG Tab, Take 1 Tablet by mouth every day., Disp: 100 Tablet, Rfl: 3    PARoxetine (PAXIL) 10 MG Tab, Take 1 Tablet by mouth every day. with food, Disp: 100 Tablet, Rfl: 3    Omega-3 Fatty Acids (FISH OIL) 1000 MG Cap capsule, Take 2,000-3,000 mg by mouth every day., Disp: , Rfl:     vitamin e (VITAMIN E) 400 UNIT Cap, Take 800 Units by mouth every day., Disp: , Rfl:     Cholecalciferol (D-3-5 PO), Take 2,000-6,000 Units by mouth every day., Disp: , Rfl:     B Complex Vitamins (B COMPLEX B-12 PO), Take  by mouth., Disp: , Rfl:     Ascorbic Acid (VITAMIN C CR) 1000 MG Tab CR, Take 1,000-2,000 mg by mouth every day., Disp: , Rfl:     thiamine (THIAMINE) 100 MG tablet, Take 1 Tablet by mouth every day. (Patient not taking: Reported on 9/10/2024), Disp: , Rfl:     therapeutic multivitamin-minerals (THERAGRAN-M) Tab, Take 1 Tab by mouth every day. (Patient not taking: Reported on 9/10/2024), Disp: , Rfl:     Physical Examination:      NEUROLOGICAL EXAM:     Mental status: Awake, alert and fully oriented  Speech and language: Speech is clear and fluent. The patient is able to name and repeat, and follow commands  Cranial nerve exam:  Visual fields are full, extraocular movements are intact, face is symmetric  Motor exam: There is sustained antigravity with no downward drift in bilateral arms and legs.    Sensory exam:  Intact to light touch in all 4 distal extremities, there is no neglect to double stim  Coordination: No ataxia on bilateral finger-to-nose testing  Gait: Deferred due to patient preference      NIHSS: National Institutes of Health Stroke Scale    1a. Level of Consciousness (Alert, drowsy, etc): 0= Alert    1b. LOC Questions (Month, age): 1= Answers one correctly    1c. LOC Commands (Open/close eyes make fist/let go): 0= Obeys both correctly    2.   Best Gaze (Eyes open - patient follows examiner's finger on face): 0= Normal    3.   Visual Fields (introduce visual stimulus/threat to patient's field quadrants): 0= No visual loss  4.   Facial Paresis (Show teeth, raise eyebrows and squeeze eyes shut): 0= Normal     5a. Motor Arm - Left (Elevate arm to 90 degrees if patient is sitting, 45 degrees if  supine): 0= No drift    5b. Motor Arm - Right (Elevate arm to 90 degrees if patient is sitting, 45 degrees if supine): 0= No drift    6a. Motor Leg - Left (Elevate leg 30 degrees with patient supine): 0= No drift    6b. Motor Leg - Right  (Elevate leg 30 degrees with patient supine): 0= No drift    7.   Limb Ataxia (Finger-nose, heel down shin): 0= No ataxia    8.   Sensory (Pin prick to face, arm, trunk and leg - compare side to side): 0= Normal    9.  Best Language (Name item, describe a picture and read sentences): 0= No aphasia    10. Dysarthria (Evaluate speech clarity by patient repeating listed words): 0= Normal articulation    11. Extinction and Inattention (Use information from prior testing to identify neglect or  double  simultaneous stimuli testing): 0= No neglect    Total NIH Score: 1      Baseline Modified Miller Scale (MRS): 1 = No significant disability, despite symptoms; able to perform all usual duties and activities    Objective Data:    Labs  Lab Results   Component Value Date/Time    WBC 8.3 08/19/2024 07:15 AM    RBC 4.65 08/19/2024 07:15 AM    HEMOGLOBIN 14.6 08/19/2024 07:15 AM    HEMATOCRIT 43.3 08/19/2024 07:15 AM    PLATELETCT 307 08/19/2024 07:15 AM      Lab Results   Component Value Date/Time    PROTHROMBTM 13.4 08/18/2024 09:50 AM    INR 1.01 08/18/2024 09:50 AM     Lab Results   Component Value Date/Time    SODIUM 140 08/19/2024 07:15 AM    POTASSIUM 4.1 08/19/2024 07:15 AM    CHLORIDE 106 08/19/2024 07:15 AM    CO2 20 08/19/2024 07:15 AM    GLUCOSE 98 08/19/2024 07:15 AM    BUN 10 08/19/2024 07:15 AM    CREATININE 0.50 08/19/2024 07:15 AM      Lab Results   Component Value Date/Time    CHOLSTRLTOT 398 (H) 08/19/2024 07:15 AM     (H) 08/19/2024 07:15 AM    HDL 53 08/19/2024 07:15 AM    TRIGLYCERIDE 233 (H) 08/19/2024 07:15 AM       Lab Results   Component Value Date/Time    HBA1C 5.8 (H) 08/19/2024 07:15 AM           Imaging/Testing:  I interpreted and/or reviewed the patient's neuroimaging    MRI brain ( ):      CT angiogram of head ( ):      CT angiogram of neck ( ):    No orders to display         Assessment:  Reina Gandara is a 74 y.o. right-handed female with multiple vascular risk factor including current smoking, significant hyperlipidemia with history of right ICA/MCA aneurysm coiling and also left ICA stent placement which are stable on follow-up imaging.  LDL is 298 for which she is on Lipitor 80 mg daily.        TOAST classification:  [] Large artery atherosclerosis  [] Cardioembolic  [x] Small vessel disease (lacunar)  [] Other:   [] Undetermined    Problem list:  -Right thalamic infarct.  - Right ICA/MCA coiling and stent  - Left ICA stent  -Current smoker  -  Hyperlipidemia    Plan:  -She will continue with Plavix 75 mg daily.  - She will continue with atorvastatin 80 mg daily.  - She was counseled about lifestyle modification to continue with daily exercise and modifying her diet.  I printed out a sample of Mediterranean diet for her to review.  - She is determined to quit smoking and was encouraged to do so.  - She will follow up with her primary care physician to continue managing and modifying her stroke risk factor.        Please note that this dictation was created using voice recognition software. I have made every reasonable attempt to correct obvious errors, but I expect that there are errors of grammar and possibly content that I did not discover before finalizing the note.       Amanda Menchaca MD  Vascular Neurology

## 2024-09-11 ENCOUNTER — TELEPHONE (OUTPATIENT)
Dept: NEUROLOGY | Facility: MEDICAL CENTER | Age: 74
End: 2024-09-11
Payer: MEDICARE

## 2024-09-12 ENCOUNTER — TELEPHONE (OUTPATIENT)
Dept: NEUROLOGY | Facility: MEDICAL CENTER | Age: 74
End: 2024-09-12
Payer: MEDICARE

## 2024-09-12 NOTE — TELEPHONE ENCOUNTER
Pt. was frustrated because she thought no one was doing anything about her residual weakness.  She wasn't aware there was a PT evaluation and follow-ups already scheduled for next month.  I gave her the dates and asked if she had access to her [employer].  She replied that she did.  I told her to login and she should be able to see all the appointments, as well as notes from previous encounters.  She indicated she would do that and would call me back if she had any issues or questions.

## 2024-09-27 ENCOUNTER — PHYSICAL THERAPY (OUTPATIENT)
Dept: PHYSICAL THERAPY | Facility: REHABILITATION | Age: 74
End: 2024-09-27
Attending: THORACIC SURGERY (CARDIOTHORACIC VASCULAR SURGERY)
Payer: MEDICARE

## 2024-09-27 DIAGNOSIS — R53.1 WEAKNESS: ICD-10-CM

## 2024-09-27 DIAGNOSIS — R26.89 OTHER ABNORMALITIES OF GAIT AND MOBILITY: ICD-10-CM

## 2024-09-27 DIAGNOSIS — Z91.81 HISTORY OF FALLING: ICD-10-CM

## 2024-09-27 PROCEDURE — 97161 PT EVAL LOW COMPLEX 20 MIN: CPT

## 2024-09-27 ASSESSMENT — BALANCE ASSESSMENTS
BALANCE - SITTING DYNAMIC: NORMAL
BALANCE - SITTING STATIC: NORMAL
BALANCE - STANDING DYNAMIC: GOOD
BALANCE - STANDING STATIC: NORMAL

## 2024-09-27 ASSESSMENT — ENCOUNTER SYMPTOMS: PAIN SCALE: 0

## 2024-09-27 NOTE — OP THERAPY EVALUATION
Outpatient Physical Therapy  INITIAL EVALUATION    [facility] [place]  [address], [address]  [place] [address]  Phone:  [phone]  Fax:  [phone]    Date of Evaluation: 2024    Patient: Reina Gandara  YOB: 1950  MRN: [id number]     Referring Provider: Cristin Gale M.D.  [address] [place] [address]   Referring Diagnosis Weakness [R53.1];Other abnormalities of gait and mobility [R26.89];History of falling [Z91.81]     Time Calculation  Start time: 1415  Stop time: 1500 Time Calculation (min): 45 minutes         Chief Complaint: Left arm limited use    Visit Diagnoses     ICD-10-CM   1. Weakness  R53.1   2. Other abnormalities of gait and mobility  R26.89   3. History of falling  Z91.81       Date of onset of impairment: No data found    Subjective   History of Present Illness:     History of chief complaint:  Reina Gandara is a 74 y.o. female that presents to therapy with a history of a stroke in Mid August of this year.  She has been referrerd to PT for balance and weakness. Denies falls or issues with balance or LE strength. Her chief complaint is dificutly with use of her left UE. Denies numbness or tingling. Patient denies fevers/chills, numbness/weakness of lower extremities, bowel/bladder incontinence, saddle anesthesia. She does not use any assistive device and has stairs in the house she uses without issue.     She has not started with OT but has a referral for OT.     ADL limitations: limited Left arm use      Pain:     Current pain ratin    Patient Goals:     Patient goals for therapy:  Gain use of her Left arm      Past Medical History:   Diagnosis Date   • Acute nasopharyngitis     cold 20   • Blood clotting disorder (HCC)     DVT   • Contact dermatitis due to cosmetics 2015   • Environmental and seasonal allergies    • Guillain Barré syndrome (HCC)     Hx    • Hemorrhagic  disorder (HCC)     plavix   • History of kidney stones     x2 episodes   • History of seizures     x2 episodes - no treatment needed   • Hyperlipidemia    • Hypertension    • Intracranial aneurysm    • PAD (peripheral artery disease) 5/7/2014   • Psychiatric problem     depression   • Reactive depression 9/17/2013   • Renal disorder     stones   • Seizure (HCC) 1988    unknown cause     Past Surgical History:   Procedure Laterality Date   • THROMBECTOMY Right 5/25/2018    Procedure: FEMORAL ENDARTERECTOMY, ANGIOPLASTY WITH PATCH, FOREIGN BODY REMOVAL;  Surgeon: Carina Durbin M.D.;  Location: SURGERY [facility];  Service: General   • RECOVERY  10/4/2013    Performed by [facility] at [facility]ERY [facility] DAY [facility]   • FEMORAL POPLITEAL BYPASS  7/7/2013    Performed by Carina Durbin M.D. at SURGERY [facility]   • RECOVERY  7/6/2013    Performed by [facility] at [facility]   • ABDOMINAL HYSTERECTOMY TOTAL  1981    w/o BSO due to fibroids   • APPENDECTOMY      during hysterectomy   • ORIF, WRIST     • OTHER      neuroma removed from feet 4-5x   • OTHER      abdominal stent   • TONSILLECTOMY         Precautions:       Objective   Range of motion and strength:    LE strength and ROM WNL    Sensation and reflexes:     Sensation is intact.    Palpation and joint mobility:     No tenderness to palpation noted.    Joint mobility is normal.    Balance:     Sitting (static): Normal    Sitting (dynamic): Normal    Standing (static): Normal    Standing (dynamic): Good    CARABALLO and DGI, uploaded to media file. Minimal balance deficits.     Gait:      Normal pattern gait.    Coordination and tone:     Tone and coordination intact     Cognition and visual perception:     Difficulty with multiple step commands,    left field of vision limited with peripheral vision starting at about 45 degrees to the left.       Exercises/Treatment    No treatment indicated for LE strength/ balance        Assessment, Response and Plan:   Assessment details:  Reina Gandara is a 74 y.o. female with signs and symptoms consistent with Left side affected stroke without observed balance or strength deficits in her LE. She has been encouraged to follow up with OT for her left UE issues and while in the clinic was able to make an appointment for next week.  Reina will be discharged at this time from PT.      Plan:   Discussed with:  Patient and family    Functional Assessment Used  PT Functional Assessment Tool Used: TAMIKO CARABALLO  PT Functional Assessment Score: RASHMI 51/56, TAMIKO 23/24     Referring provider co-signature:  I have reviewed this plan of care and my co-signature certifies the need for services.    Certification Period: 09/27/2024 to  09/27/24    Physician Signature: ________________________________ Date: ______________

## 2024-10-01 ENCOUNTER — OCCUPATIONAL THERAPY (OUTPATIENT)
Dept: OCCUPATIONAL THERAPY | Facility: REHABILITATION | Age: 74
End: 2024-10-01
Attending: INTERNAL MEDICINE
Payer: MEDICARE

## 2024-10-01 DIAGNOSIS — R29.898 LEFT ARM WEAKNESS: ICD-10-CM

## 2024-10-01 DIAGNOSIS — I63.9 CEREBROVASCULAR ACCIDENT (CVA), UNSPECIFIED MECHANISM (HCC): ICD-10-CM

## 2024-10-01 PROCEDURE — 97166 OT EVAL MOD COMPLEX 45 MIN: CPT

## 2024-10-01 PROCEDURE — 97110 THERAPEUTIC EXERCISES: CPT

## 2024-10-01 ASSESSMENT — ACTIVITIES OF DAILY LIVING (ADL)
PREPARING MEALS: MODERATE ASSIST
LAUNDRY: SUPERVISION
WRITING: INDEPENDENT
LIGHT HOUSEKEEPING: SUPERVISION
SHOPPING: SUPERVISION

## 2024-10-01 ASSESSMENT — ENCOUNTER SYMPTOMS
PAIN SCALE AT LOWEST: 0
PAIN SCALE: 0
PAIN SCALE AT HIGHEST: 0

## 2024-10-07 ENCOUNTER — APPOINTMENT (OUTPATIENT)
Dept: PHYSICAL THERAPY | Facility: REHABILITATION | Age: 74
End: 2024-10-07
Attending: INTERNAL MEDICINE
Payer: MEDICARE

## 2024-10-08 ENCOUNTER — OCCUPATIONAL THERAPY (OUTPATIENT)
Dept: OCCUPATIONAL THERAPY | Facility: REHABILITATION | Age: 74
End: 2024-10-08
Attending: INTERNAL MEDICINE
Payer: MEDICARE

## 2024-10-08 DIAGNOSIS — I63.9 CEREBROVASCULAR ACCIDENT (CVA), UNSPECIFIED MECHANISM (HCC): ICD-10-CM

## 2024-10-08 DIAGNOSIS — R29.898 LEFT ARM WEAKNESS: ICD-10-CM

## 2024-10-08 PROCEDURE — 97535 SELF CARE MNGMENT TRAINING: CPT

## 2024-10-08 PROCEDURE — 97110 THERAPEUTIC EXERCISES: CPT

## 2024-10-11 ENCOUNTER — TELEPHONE (OUTPATIENT)
Dept: HEALTH INFORMATION MANAGEMENT | Facility: OTHER | Age: 74
End: 2024-10-11
Payer: MEDICARE

## 2024-10-15 ENCOUNTER — APPOINTMENT (OUTPATIENT)
Dept: PHYSICAL THERAPY | Facility: REHABILITATION | Age: 74
End: 2024-10-15
Attending: INTERNAL MEDICINE
Payer: MEDICARE

## 2024-10-16 ENCOUNTER — OCCUPATIONAL THERAPY (OUTPATIENT)
Dept: OCCUPATIONAL THERAPY | Facility: REHABILITATION | Age: 74
End: 2024-10-16
Attending: INTERNAL MEDICINE
Payer: MEDICARE

## 2024-10-16 DIAGNOSIS — I63.9 CEREBROVASCULAR ACCIDENT (CVA), UNSPECIFIED MECHANISM (HCC): ICD-10-CM

## 2024-10-16 PROCEDURE — 97110 THERAPEUTIC EXERCISES: CPT

## 2024-10-29 ENCOUNTER — APPOINTMENT (OUTPATIENT)
Dept: OCCUPATIONAL THERAPY | Facility: REHABILITATION | Age: 74
End: 2024-10-29
Attending: INTERNAL MEDICINE
Payer: MEDICARE

## 2024-10-30 ENCOUNTER — APPOINTMENT (OUTPATIENT)
Dept: PHYSICAL THERAPY | Facility: REHABILITATION | Age: 74
End: 2024-10-30
Attending: INTERNAL MEDICINE
Payer: MEDICARE

## 2024-11-06 ENCOUNTER — APPOINTMENT (OUTPATIENT)
Dept: PHYSICAL THERAPY | Facility: REHABILITATION | Age: 74
End: 2024-11-06
Attending: INTERNAL MEDICINE
Payer: MEDICARE

## 2024-11-12 ENCOUNTER — APPOINTMENT (OUTPATIENT)
Dept: PHYSICAL THERAPY | Facility: REHABILITATION | Age: 74
End: 2024-11-12
Attending: INTERNAL MEDICINE
Payer: MEDICARE

## 2024-11-21 ENCOUNTER — APPOINTMENT (OUTPATIENT)
Dept: PHYSICAL THERAPY | Facility: REHABILITATION | Age: 74
End: 2024-11-21
Attending: INTERNAL MEDICINE
Payer: MEDICARE

## 2024-12-04 ENCOUNTER — APPOINTMENT (OUTPATIENT)
Dept: OCCUPATIONAL THERAPY | Facility: REHABILITATION | Age: 74
End: 2024-12-04
Attending: INTERNAL MEDICINE
Payer: MEDICARE

## 2024-12-05 ENCOUNTER — APPOINTMENT (OUTPATIENT)
Dept: PHYSICAL THERAPY | Facility: REHABILITATION | Age: 74
End: 2024-12-05
Attending: INTERNAL MEDICINE
Payer: MEDICARE

## 2024-12-06 ENCOUNTER — APPOINTMENT (OUTPATIENT)
Dept: OCCUPATIONAL THERAPY | Facility: REHABILITATION | Age: 74
End: 2024-12-06
Attending: INTERNAL MEDICINE
Payer: MEDICARE

## 2024-12-09 ENCOUNTER — APPOINTMENT (OUTPATIENT)
Dept: PHYSICAL THERAPY | Facility: REHABILITATION | Age: 74
End: 2024-12-09
Attending: INTERNAL MEDICINE
Payer: MEDICARE

## 2024-12-10 ENCOUNTER — APPOINTMENT (OUTPATIENT)
Dept: OCCUPATIONAL THERAPY | Facility: REHABILITATION | Age: 74
End: 2024-12-10
Attending: INTERNAL MEDICINE
Payer: MEDICARE

## 2024-12-11 ENCOUNTER — APPOINTMENT (OUTPATIENT)
Dept: PHYSICAL THERAPY | Facility: REHABILITATION | Age: 74
End: 2024-12-11
Attending: INTERNAL MEDICINE
Payer: MEDICARE

## 2024-12-13 ENCOUNTER — APPOINTMENT (OUTPATIENT)
Dept: OCCUPATIONAL THERAPY | Facility: REHABILITATION | Age: 74
End: 2024-12-13
Attending: INTERNAL MEDICINE
Payer: MEDICARE

## 2024-12-16 ENCOUNTER — APPOINTMENT (OUTPATIENT)
Dept: PHYSICAL THERAPY | Facility: REHABILITATION | Age: 74
End: 2024-12-16
Attending: INTERNAL MEDICINE
Payer: MEDICARE

## 2024-12-17 ENCOUNTER — APPOINTMENT (OUTPATIENT)
Dept: OCCUPATIONAL THERAPY | Facility: REHABILITATION | Age: 74
End: 2024-12-17
Attending: INTERNAL MEDICINE
Payer: MEDICARE

## 2024-12-18 ENCOUNTER — APPOINTMENT (OUTPATIENT)
Dept: PHYSICAL THERAPY | Facility: REHABILITATION | Age: 74
End: 2024-12-18
Attending: INTERNAL MEDICINE
Payer: MEDICARE

## 2024-12-19 ENCOUNTER — APPOINTMENT (OUTPATIENT)
Dept: OCCUPATIONAL THERAPY | Facility: REHABILITATION | Age: 74
End: 2024-12-19
Attending: INTERNAL MEDICINE
Payer: MEDICARE

## 2025-03-10 ENCOUNTER — OFFICE VISIT (OUTPATIENT)
Dept: FAMILY PLANNING/WOMEN'S HEALTH CLINIC | Facility: PHYSICIAN GROUP | Age: 75
End: 2025-03-10
Attending: FAMILY MEDICINE
Payer: MEDICARE

## 2025-03-10 VITALS
HEIGHT: 62 IN | BODY MASS INDEX: 17.11 KG/M2 | HEART RATE: 72 BPM | OXYGEN SATURATION: 93 % | SYSTOLIC BLOOD PRESSURE: 120 MMHG | WEIGHT: 93 LBS | DIASTOLIC BLOOD PRESSURE: 62 MMHG | TEMPERATURE: 99.6 F

## 2025-03-10 DIAGNOSIS — G30.1 MODERATE LATE ONSET ALZHEIMER'S DEMENTIA WITHOUT BEHAVIORAL DISTURBANCE, PSYCHOTIC DISTURBANCE, MOOD DISTURBANCE, OR ANXIETY (HCC): Chronic | ICD-10-CM

## 2025-03-10 DIAGNOSIS — F33.1 MODERATE EPISODE OF RECURRENT MAJOR DEPRESSIVE DISORDER (HCC): ICD-10-CM

## 2025-03-10 DIAGNOSIS — Z72.0 TOBACCO ABUSE: ICD-10-CM

## 2025-03-10 DIAGNOSIS — F02.B0 MODERATE LATE ONSET ALZHEIMER'S DEMENTIA WITHOUT BEHAVIORAL DISTURBANCE, PSYCHOTIC DISTURBANCE, MOOD DISTURBANCE, OR ANXIETY (HCC): Chronic | ICD-10-CM

## 2025-03-10 DIAGNOSIS — I73.9 PAD (PERIPHERAL ARTERY DISEASE) (HCC): ICD-10-CM

## 2025-03-10 DIAGNOSIS — E44.0 MODERATE PROTEIN-CALORIE MALNUTRITION (HCC): ICD-10-CM

## 2025-03-10 DIAGNOSIS — E78.5 DYSLIPIDEMIA: ICD-10-CM

## 2025-03-10 DIAGNOSIS — M81.0 AGE-RELATED OSTEOPOROSIS WITHOUT CURRENT PATHOLOGICAL FRACTURE: ICD-10-CM

## 2025-03-10 PROCEDURE — 99999 PR NO CHARGE: CPT

## 2025-03-10 SDOH — ECONOMIC STABILITY: FOOD INSECURITY: WITHIN THE PAST 12 MONTHS, YOU WORRIED THAT YOUR FOOD WOULD RUN OUT BEFORE YOU GOT THE MONEY TO BUY MORE.: NEVER TRUE

## 2025-03-10 SDOH — ECONOMIC STABILITY: HOUSING INSECURITY: IN THE LAST 12 MONTHS, WAS THERE A TIME WHEN YOU WERE NOT ABLE TO PAY THE MORTGAGE OR RENT ON TIME?: NO

## 2025-03-10 SDOH — ECONOMIC STABILITY: HOUSING INSECURITY: IN THE PAST 12 MONTHS, HOW MANY TIMES HAVE YOU MOVED WHERE YOU WERE LIVING?: 0

## 2025-03-10 SDOH — ECONOMIC STABILITY: FOOD INSECURITY: WITHIN THE PAST 12 MONTHS, THE FOOD YOU BOUGHT JUST DIDN'T LAST AND YOU DIDN'T HAVE MONEY TO GET MORE.: NEVER TRUE

## 2025-03-10 SDOH — ECONOMIC STABILITY: HOUSING INSECURITY: AT ANY TIME IN THE PAST 12 MONTHS, WERE YOU HOMELESS OR LIVING IN A SHELTER (INCLUDING NOW)?: NO

## 2025-03-10 SDOH — ECONOMIC STABILITY: TRANSPORTATION INSECURITY: IN THE PAST 12 MONTHS, HAS LACK OF TRANSPORTATION KEPT YOU FROM MEDICAL APPOINTMENTS OR FROM GETTING MEDICATIONS?: NO

## 2025-03-10 SDOH — ECONOMIC STABILITY: FOOD INSECURITY: HOW HARD IS IT FOR YOU TO PAY FOR THE VERY BASICS LIKE FOOD, HOUSING, MEDICAL CARE, AND HEATING?: NOT HARD AT ALL

## 2025-03-10 ASSESSMENT — FIBROSIS 4 INDEX: FIB4 SCORE: 1.25

## 2025-03-10 ASSESSMENT — ENCOUNTER SYMPTOMS: GENERAL WELL-BEING: GOOD

## 2025-03-10 ASSESSMENT — PAIN SCALES - GENERAL: PAINLEVEL_OUTOF10: NO PAIN

## 2025-03-10 ASSESSMENT — ACTIVITIES OF DAILY LIVING (ADL)
TRANSPORTATION COMMENTS: HUSBAND DOES
BATHING_REQUIRES_ASSISTANCE: 0
LACK_OF_TRANSPORTATION: NO

## 2025-03-10 ASSESSMENT — PATIENT HEALTH QUESTIONNAIRE - PHQ9
5. POOR APPETITE OR OVEREATING: 0 - NOT AT ALL
CLINICAL INTERPRETATION OF PHQ2 SCORE: 1

## 2025-03-10 NOTE — ASSESSMENT & PLAN NOTE
Chronic, stable.  The patient reports short term memory problems. The patient is being followed by neurology.  No current treatment.  Follow-up at least annually.

## 2025-03-10 NOTE — ASSESSMENT & PLAN NOTE
Chronic, stable.  The patient denies intermittent claudication. She is s/p left leg surgical intervention.  No current treatment.  Follow-up at least annually.

## 2025-03-10 NOTE — ASSESSMENT & PLAN NOTE
Chronic, stable.  BMI 17.  Weight 93 lb.  The patient reports that she has been slim most of her life. She reports a healthy diet and adequate calorie intake.  Follow-up at least annually.

## 2025-03-10 NOTE — ASSESSMENT & PLAN NOTE
Chronic, stable. The patient reports that she smokes about 10 cigarettes per day. She is not ready to quit at this time. Counseling provided.  Follow-up at least annually.

## 2025-03-10 NOTE — PROGRESS NOTES
Comprehensive Health Assessment Program     Reina Gandara is a 74 y.o. here for her comprehensive health assessment.    Patient Active Problem List    Diagnosis Date Noted    Left upper extremity numbness 08/18/2024    Body mass index (BMI) of 19.0 to 19.9 in adult 05/04/2022    Moderate episode of recurrent major depressive disorder ([facility]) 05/04/2022    Disorder of arteries and arterioles ([facility]) 05/04/2022    Secondary hypercoagulable state ([facility]) 05/04/2022    Age related osteoporosis 10/01/2021    Hypocalcemia 10/01/2021    Alzheimer's dementia ([facility]) 08/25/2021    Moderate protein-calorie malnutrition ([facility]) 01/17/2019    History of left common carotid artery stent placement 01/17/2019    History of femoral artery thrombosis 01/17/2019    Aneurysm of left internal carotid artery 10/22/2018    Aneurysm of middle cerebral artery 05/26/2018    Tobacco abuse 05/25/2018    Irritant contact dermatitis due to other agents 01/16/2018    Abnormal TSH 01/16/2018    PAD (peripheral artery disease) ([facility]) 01/25/2016    H/O bee sting allergy 08/26/2014    Dyslipidemia 08/11/2014    Reactive depression 09/17/2013    Hx of Guillain-[place] syndrome 09/17/2013    History of seizures     History of kidney stones        Current Outpatient Medications   Medication Sig Dispense Refill    atorvastatin (LIPITOR) 80 MG tablet Take 1 Tablet by mouth every evening. 30 Tablet 0    cyanocobalamin (VITAMIN B12) 1000 MCG Tab Take 1 Tablet by mouth every day.      multivitamin Tab Take 1 Tablet by mouth every day.      thiamine (THIAMINE) 100 MG tablet Take 1 Tablet by mouth every day. (Patient not taking: Reported on 9/10/2024)      acetaminophen (TYLENOL) 500 MG Tab Take 500 mg by mouth 1 time a day as needed for Mild Pain or Moderate Pain.      clopidogrel (PLAVIX) 75 MG Tab Take 1 Tablet by mouth every day. 100 Tablet 3    PARoxetine (PAXIL) 10 MG Tab Take 1 Tablet by mouth every day. with food 100 Tablet 3    Omega-3 Fatty  Acids (FISH OIL) 1000 MG Cap capsule Take 2,000-3,000 mg by mouth every day.      vitamin e (VITAMIN E) 400 UNIT Cap Take 800 Units by mouth every day.      Cholecalciferol (D-3-5 PO) Take 2,000-6,000 Units by mouth every day.      B Complex Vitamins (B COMPLEX B-12 PO) Take  by mouth.      therapeutic multivitamin-minerals (THERAGRAN-M) Tab Take 1 Tab by mouth every day. (Patient not taking: Reported on 9/10/2024)      Ascorbic Acid (VITAMIN C CR) 1000 MG Tab CR Take 1,000-2,000 mg by mouth every day.       No current facility-administered medications for this visit.          Current supplements as per medication list.     Allergies:   Bee, Nsaids, Codeine, and Keflex  Social History     Tobacco Use    Smoking status: Every Day     Current packs/day: 0.50     Average packs/day: 0.5 packs/day for 50.0 years (25.0 ttl pk-yrs)     Types: Cigarettes    Smokeless tobacco: Never   Vaping Use    Vaping status: Never Used   Substance Use Topics    Alcohol use: Not Currently     Alcohol/week: 0.6 oz     Types: 1 Glasses of wine per week    Drug use: No     Family History   Problem Relation Age of Onset    Other Sister         tremors / instability    Lung Disease Sister         COPD     Hyperlipidemia Sister     Other Paternal Grandfather         brain aneurysm     Reina  has a past medical history of Acute nasopharyngitis, Blood clotting disorder (HCC) (2016/2017), Contact dermatitis due to cosmetics (9/16/2015), Environmental and seasonal allergies, 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   Past Surgical History:   Procedure Laterality Date    THROMBECTOMY Right 5/25/2018    Procedure: FEMORAL ENDARTERECTOMY, ANGIOPLASTY WITH PATCH, FOREIGN BODY REMOVAL;  Surgeon:  Carina Durbin M.D.;  Location: SURGERY [facility];  Service: General    RECOVERY  10/4/2013    Performed by [facility] at SURGERY SAME DAY [facility] ORS    FEMORAL POPLITEAL BYPASS  7/7/2013    Performed by Carina Durbin M.D. at SURGERY [facility]    RECOVERY  7/6/2013    Performed by [facility] at SURGERY [facility]    ABDOMINAL HYSTERECTOMY TOTAL  1981    w/o BSO due to fibroids    APPENDECTOMY      during hysterectomy    ORIF, WRIST      OTHER      neuroma removed from feet 4-5x    OTHER      abdominal stent    TONSILLECTOMY         Screening:  In the last six months have you experienced any leakage of urine? No    Depression Screening  Little interest or pleasure in doing things?     Feeling down, depressed , or hopeless? 1 - several days  Trouble falling or staying asleep, or sleeping too much?  0 - not at all  Feeling tired or having little energy?  0 - not at all  Poor appetite or overeating?  0 - not at all  Feeling bad about yourself - or that you are a failure or have let yourself or your family down? 1 - several days  Trouble concentrating on things, such as reading the newspaper or watching television? 1 - several days  Moving or speaking so slowly that other people could have noticed.  Or the opposite - being so fidgety or restless that you have been moving around a lot more than usual?  0 - not at all  Thoughts that you would be better off dead, or of hurting yourself?  0 - not at all  Patient Health Questionnaire Score:  3    If depressive symptoms identified deferred to follow up visit unless specifically addressed in assessment and plan.    Interpretation of PHQ-9 Total Score   Score Severity   1-4 No Depression   5-9 Mild Depression   10-14 Moderate Depression   15-19 Moderately Severe Depression   20-27 Severe Depression    Screening for Cognitive Impairment  Do you or any of your friends or family members have any concern about your memory? Yes      Fall  Risk Assessment  Has the patient had two or more falls in the last year or any fall with injury in the last year?  No    Safety Assessment  Do you always wear your seatbelt?  Yes  Any changes to home needed to function safely? No  Difficulty hearing.  Yes  Patient counseled about all safety risks that were identified.    Functional Assessment ADLs  Are there any barriers preventing you from cooking for yourself or meeting nutritional needs?  No.    Are there any barriers preventing you from driving safely or obtaining transportation?  Yes.  does  Are there any barriers preventing you from using a telephone or calling for help?  No    Are there any barriers preventing you from shopping?  No.    Are there any barriers preventing you from taking care of your own finances?  Yes  does   Are there any barriers preventing you from managing your medications?  No    Are there any barriers preventing you from showering, bathing or dressing yourself? No    Are there any barriers preventing you from doing housework or laundry? No    Are there any barriers preventing you from using the toilet?No    Are you currently engaging in any exercise or physical activity?  Yes.      Self-Assessment of Health  What is your perception of your health? Good    Do you sleep more than six hours a night? Yes    In the past 7 days, how much did pain keep you from doing your normal work? None    Do you spend quality time with family or friends (virtually or in person)? No Lives with   Do you usually eat a heart healthy diet that constists of a variety of fruits, vegetables, whole grains and fiber? Yes Eats a lot of bread  Do you eat foods high in fat and/or Fast Food more than three times per week? No    How concerned are you that your medical conditions are not being well managed? Not at all    Are you worried that in the next 2 months, you may not have stable housing that you own, rent, or stay in as part of a household? No         Advance Care Planning  Do you have an Advance Directive, Living Will, Durable Power of , or POLST? Yes          is not on file - instructed patient to bring in a copy to scan into their chart      Health Maintenance Summary            Current Care Gaps       Zoster (Shingles) Vaccines (2 of 3) Overdue since 10/1/2014      08/06/2014  Imm Admin: Zoster Vaccine Live (ZVL) (Zostavax) - HISTORICAL DATA              Annual Wellness Visit (Yearly) Overdue since 3/19/2024      03/19/2023  Subsequent Annual Wellness Visit - Includes PPPS ()    03/10/2023  Visit Dx: Encounter for Medicare annual wellness exam    05/04/2022  Level of Service: [place] ANNUAL WELLNESS VISIT-INCLUDES PPPS SUBSEQUE*    11/16/2020  Visit Dx: Medicare annual wellness visit, subsequent    11/16/2020  Subsequent Annual Wellness Visit - Includes PPPS ()     Only the first 5 history entries have been loaded, but more history exists.            Influenza Vaccine (1) Overdue since 9/1/2024 11/16/2020  Imm Admin: Influenza Vaccine Adult HD    10/23/2019  Imm Admin: Influenza Vaccine Adult HD    01/17/2019  Imm Admin: Influenza Vaccine Adult HD    01/16/2018  Imm Admin: Influenza Vaccine Adult HD    11/07/2016  Imm Admin: Influenza Vaccine Adult HD      Only the first 5 history entries have been loaded, but more history exists.              Hepatitis C Screening (Once) Never done      No completion history exists for this topic.              Hepatitis A Vaccine (Hep A) (1 of 2 - Risk 2-dose series) Never done     No completion history exists for this topic.              IMM DTaP/Tdap/Td Vaccine (1 - Tdap) Never done     No completion history exists for this topic.              Lung Cancer Screening Shared Decision Making (Once) Never done     No completion history exists for this topic.              COVID-19 Vaccine (1 - 2024-25 season) Never done     No completion history exists for this topic.                      Upcoming        Bone Density Scan (Every 5 Years) Next due on 9/10/2026      09/10/2021  DS-BONE DENSITY STUDY (DEXA)    07/20/2015  DS-BONE DENSITY STUDY (DEXA)    06/22/2006  DS-BONE DENSITY STUDY (DEXA)    09/27/2004  DS-BONE DENSITY STUDY (DEXA)                      Completed or No Longer Recommended       Pneumococcal Vaccine: 50+ Years (Series Information) Completed      01/25/2016  Imm Admin: Pneumococcal Conjugate Vaccine (Prevnar/PCV-13)    07/09/2013  Imm Admin: Pneumococcal polysaccharide vaccine (PPSV-23)              HPV Vaccines (Series Information) Aged Out     No completion history exists for this topic.              Polio Vaccine (Inactivated Polio) (Series Information) Aged Out     No completion history exists for this topic.              Meningococcal Immunization (Series Information) Aged Out     No completion history exists for this topic.              Mammogram  Discontinued        Frequency changed to Never automatically (Topic No Longer Applies)    05/16/2023  MA-SCREENING MAMMO BILAT W/TOMOSYNTHESIS W/CAD    12/18/2020  MA-SCREENING MAMMO BILAT W/TOMOSYNTHESIS W/CAD    07/23/2018  MA-MAMMO DIAGNOSTIC BILAT W/CLAUDIA W/CAD    07/20/2015  MA-SCREEN MAMMO W/CAD-BILAT     Only the first 5 history entries have been loaded, but more history exists.            Colorectal Cancer Screening  Discontinued        Frequency changed to Never automatically (Topic No Longer Applies)    07/16/2018  Noninvasive Colorectal Cancer DNA and Occult Blood Screening component of COLOGUARD COLON CANCER SCREENING    07/16/2018  COLOGUARD COLON CANCER SCREENING    11/08/2016  OCCULT BLOOD FECES IMMUNOASSAY    08/09/2014  OCCULT BLOOD FECES IMMUNOASSAY      Only the first 5 history entries have been loaded, but more history exists.              Hepatitis B Vaccine (Hep B)  Discontinued      No completion history exists for this topic.                            Patient Care Team:  Vivienne Santso M.D. as PCP - General (Family  "Medicine)  ALETA Mahajan as PCP - [facility] Paneled  Carina Durbin M.D. as Consulting Physician (Surgery)  Janes Rivero M.D. as Medical Home Care Manager (Diagnostic Radiology)  Bret Stiles O.D. as Consulting Physician (Optometry)  Marjorie Pacheco (Inactive) as [facility]     Financial Resource Strain: Low Risk  (3/10/2025)    Overall Financial Resource Strain (CARDIA)     Difficulty of Paying Living Expenses: Not hard at all      Transportation Needs: No Transportation Needs (3/10/2025)    PRAPARE - Transportation     Lack of Transportation (Medical): No     Lack of Transportation (Non-Medical): No      Food Insecurity: No Food Insecurity (3/10/2025)    Hunger Vital Sign     Worried About Running Out of Food in the Last Year: Never true     Ran Out of Food in the Last Year: Never true        Encounter Vitals  Temperature: 37.6 °C (99.6 °F)  Temp src: Temporal  Blood Pressure : 120/62  Pulse: 72  Pulse Oximetry: 93 %  O2 Delivery Device: None - Room Air  Weight: 42.2 kg (93 lb)  Height: 157.5 cm (5' 2\")  BMI (Calculated): 17.01  Pain Score: No pain  DME  O2 Delivery Device: None - Room Air     ROS:  No fever, chills, nausea, vomiting, diarrhea, chest pain or shortness of breath. See HPI.    Physical Exam:  Constitutional: NAD  HENMT: NC/AT, PERRLA, EOMI, OP clear, TM's clear, no lymphadenopathy, no thyromegaly.  No JVD.  Cardiovascular: RRR, No m/r/g  Lungs: CTAB, no w/r/r  Extremities: 2+ DP, PT and Radial pulses bilaterally. No c/c/e  Skin: No legions notes  Neurologic: Alert & oriented x3, CN II-XII grossly intact    Assessment and Plan. The following treatment and monitoring plan is recommended:    Alzheimer's dementia (HCC)  Chronic, stable.  The patient reports short term memory problems. The patient is being followed by neurology.  No current treatment.  Follow-up at least annually.      Dyslipidemia   Latest Reference Range & Units 08/19/24 07:15 "   Cholesterol,Tot 100 - 199 mg/dL 398 (H)   Triglycerides 0 - 149 mg/dL 233 (H)   HDL >=40 mg/dL 53   LDL <100 mg/dL 298 (H)   (H): Data is abnormally high    Chronic, stable. The patient is going to discuss possible treatment options with her PCP on 3/19/25.  Follow-up at least annually.      Moderate episode of recurrent major depressive disorder (HCC)  Chronic, stable.  PHQ-9 score today is 3. The patient reports stable mood most of the time.  She denies SI/HI. Not currently in counseling.  The patient reports that she was taking Paxil but just run out. Discussed that her prescription at her pharmacy has refills and that she needs to call and request them to be filled.  Also discussed Behavioral Health referral for for individual counseling. The patient verbalized understanding and agreement. Referral placed this visit.  Follow-up at least annually.      Moderate protein-calorie malnutrition (HCC)  Chronic, stable.  BMI 17.  Weight 93 lb.  The patient reports that she has been slim most of her life. She reports a healthy diet and adequate calorie intake.  Follow-up at least annually.      PAD (peripheral artery disease) (HCC)  Chronic, stable.  The patient denies intermittent claudication. She is s/p left leg surgical intervention.  No current treatment.  Follow-up at least annually.      Tobacco abuse  Chronic, stable. The patient reports that she smokes about 10 cigarettes per day. She is not ready to quit at this time. Counseling provided.  Follow-up at least annually.      Age related osteoporosis  Chronic, stable. The patient denies any recent falls of fractures. Discussed adding weight bearing exercise such as walking for 30 minutes most of the days of the week.  Also discussed adequate calcium and vitamin D ingestion and optimal diet.  Follow up at least annually.       Services suggested: No services needed at this time  Health Care Screening: Age-appropriate preventive services recommended by USPTF and  ACIP covered by Medicare were discussed today. Services ordered if indicated and agreed upon by the patient.  Referrals offered: Community-based lifestyle interventions to reduce health risks and promote self-management and wellness, fall prevention, nutrition, physical activity, tobacco-use cessation, weight loss, and mental health services as per orders if indicated.    Discussion today about general wellness and lifestyle habits:    Prevent falls and reduce trip hazards; Cautioned about securing or removing rugs.  Have a working fire alarm and carbon monoxide detector.  Engage in regular physical activity and social activities.    Follow-up: Return for appointment with Primary Care Provider as needed.

## 2025-03-10 NOTE — ASSESSMENT & PLAN NOTE
Latest Reference Range & Units 08/19/24 07:15   Cholesterol,Tot 100 - 199 mg/dL 398 (H)   Triglycerides 0 - 149 mg/dL 233 (H)   HDL >=40 mg/dL 53   LDL <100 mg/dL 298 (H)   (H): Data is abnormally high    Chronic, stable. The patient is going to discuss possible treatment options with her PCP on 3/19/25.  Follow-up at least annually.

## 2025-03-10 NOTE — ASSESSMENT & PLAN NOTE
Chronic, stable.  PHQ-9 score today is 3. The patient reports stable mood most of the time.  She denies SI/HI. Not currently in counseling.  The patient reports that she was taking Paxil but just run out. Discussed that her prescription at her pharmacy has refills and that she needs to call and request them to be filled.  Also discussed Behavioral Health referral for for individual counseling. The patient verbalized understanding and agreement. Referral placed this visit.  Follow-up at least annually.

## 2025-03-19 ENCOUNTER — OFFICE VISIT (OUTPATIENT)
Dept: MEDICAL GROUP | Facility: CLINIC | Age: 75
End: 2025-03-19
Payer: MEDICARE

## 2025-03-19 VITALS
DIASTOLIC BLOOD PRESSURE: 68 MMHG | SYSTOLIC BLOOD PRESSURE: 104 MMHG | WEIGHT: 99.9 LBS | HEART RATE: 92 BPM | OXYGEN SATURATION: 97 % | HEIGHT: 62 IN | BODY MASS INDEX: 18.38 KG/M2 | TEMPERATURE: 98.8 F

## 2025-03-19 DIAGNOSIS — I77.9 DISORDER OF ARTERIES AND ARTERIOLES (HCC): ICD-10-CM

## 2025-03-19 DIAGNOSIS — F33.1 MODERATE EPISODE OF RECURRENT MAJOR DEPRESSIVE DISORDER (HCC): ICD-10-CM

## 2025-03-19 DIAGNOSIS — Z72.0 TOBACCO ABUSE: ICD-10-CM

## 2025-03-19 DIAGNOSIS — F02.818 DEMENTIA OF THE ALZHEIMER'S TYPE, WITH LATE ONSET, WITH DELUSIONS (HCC): ICD-10-CM

## 2025-03-19 DIAGNOSIS — E78.5 DYSLIPIDEMIA: ICD-10-CM

## 2025-03-19 DIAGNOSIS — G30.1 DEMENTIA OF THE ALZHEIMER'S TYPE, WITH LATE ONSET, WITH DELUSIONS (HCC): ICD-10-CM

## 2025-03-19 DIAGNOSIS — F17.200 SMOKER: ICD-10-CM

## 2025-03-19 DIAGNOSIS — I67.1 ANEURYSM OF LEFT INTERNAL CAROTID ARTERY: ICD-10-CM

## 2025-03-19 DIAGNOSIS — F32.9 REACTIVE DEPRESSION: ICD-10-CM

## 2025-03-19 DIAGNOSIS — I67.1 ANEURYSM OF MIDDLE CEREBRAL ARTERY: ICD-10-CM

## 2025-03-19 DIAGNOSIS — F02.B0 MODERATE LATE ONSET ALZHEIMER'S DEMENTIA WITHOUT BEHAVIORAL DISTURBANCE, PSYCHOTIC DISTURBANCE, MOOD DISTURBANCE, OR ANXIETY (HCC): Chronic | ICD-10-CM

## 2025-03-19 DIAGNOSIS — G30.1 MODERATE LATE ONSET ALZHEIMER'S DEMENTIA WITHOUT BEHAVIORAL DISTURBANCE, PSYCHOTIC DISTURBANCE, MOOD DISTURBANCE, OR ANXIETY (HCC): Chronic | ICD-10-CM

## 2025-03-19 PROCEDURE — 3078F DIAST BP <80 MM HG: CPT | Performed by: FAMILY MEDICINE

## 2025-03-19 PROCEDURE — 99204 OFFICE O/P NEW MOD 45 MIN: CPT | Performed by: FAMILY MEDICINE

## 2025-03-19 PROCEDURE — 3074F SYST BP LT 130 MM HG: CPT | Performed by: FAMILY MEDICINE

## 2025-03-19 ASSESSMENT — FIBROSIS 4 INDEX: FIB4 SCORE: 1.25

## 2025-03-20 PROBLEM — F02.818 DEMENTIA OF THE ALZHEIMER'S TYPE, WITH LATE ONSET, WITH DELUSIONS (HCC): Status: ACTIVE | Noted: 2025-03-20

## 2025-03-20 PROBLEM — G30.1 DEMENTIA OF THE ALZHEIMER'S TYPE, WITH LATE ONSET, WITH DELUSIONS (HCC): Status: ACTIVE | Noted: 2025-03-20

## 2025-03-20 PROBLEM — F17.200 SMOKER: Status: ACTIVE | Noted: 2018-05-25

## 2025-03-20 ASSESSMENT — ENCOUNTER SYMPTOMS
WEIGHT LOSS: 0
NEUROLOGICAL NEGATIVE: 1
MUSCULOSKELETAL NEGATIVE: 1
CARDIOVASCULAR NEGATIVE: 1
GASTROINTESTINAL NEGATIVE: 1
RESPIRATORY NEGATIVE: 1
FEVER: 0
CHILLS: 0
DEPRESSION: 1

## 2025-03-20 NOTE — PROGRESS NOTES
"Subjective     Alisa Maria Eugenia Gandara is a 74 y.o. female who presents with [facility]            Here to establish care. Was diagnosed with Alzheimer Disease. Not sure what medications she should be taking. Is sometimes frustrated with medication organizations and supplements.     Also has a report of a CVA.         Review of Systems   Constitutional:  Negative for chills, fever and weight loss.   Respiratory: Negative.     Cardiovascular: Negative.    Gastrointestinal: Negative.    Genitourinary: Negative.    Musculoskeletal: Negative.    Skin:  Negative for rash.   Neurological: Negative.    Endo/Heme/Allergies: Negative.    Psychiatric/Behavioral:  Positive for depression.               Objective     /68   Pulse 92   Temp 37.1 °C (98.8 °F) (Temporal)   Ht 1.575 m (5' 2\")   Wt 45.3 kg (99 lb 14.4 oz)   LMP  (LMP Unknown)   SpO2 97%   BMI 18.27 kg/m²      Physical Exam  Constitutional:       General: She is not in acute distress.     Appearance: Ill appearance: thin but well nourished.   Eyes:      General: No scleral icterus.  Pulmonary:      Effort: No respiratory distress.   Skin:     General: Skin is warm and dry.      Coloration: Skin is not jaundiced or pale.      Findings: No rash.   Neurological:      General: No focal deficit present.      Mental Status: She is alert.      Motor: No weakness.      Gait: Gait normal.   Psychiatric:         Mood and Affect: Mood normal.         Behavior: Behavior normal.         Thought Content: Thought content normal.         Judgment: Judgment normal.      Comments:  does provide some prompting or reminding about facts, but patient is overall able to independently remember story and answer questions.                                    Assessment & Plan  Moderate late onset Alzheimer's dementia without behavioral disturbance, psychotic disturbance, mood disturbance, or anxiety (HCC)         Tobacco abuse         Aneurysm of left internal carotid " artery         Aneurysm of middle cerebral artery         Dyslipidemia         Moderate episode of recurrent major depressive disorder (HCC)         Disorder of arteries and arterioles (HCC)         Reactive depression         Smoker          Spent >45 minutes in direct care and care coordination of patient, reviewing records, discussing prior history and plan.  Discussed trial of medication for AD, unsure if patient would be eligible.  Discussed difference between AD and MID, and opportunity to treat both.   Patient declines statin.  Unclear about medication regimen. Encouraged patient and  to return next week with medication bag.

## 2025-03-26 ENCOUNTER — OFFICE VISIT (OUTPATIENT)
Dept: MEDICAL GROUP | Facility: CLINIC | Age: 75
End: 2025-03-26
Payer: MEDICARE

## 2025-03-26 VITALS
WEIGHT: 96.2 LBS | OXYGEN SATURATION: 93 % | DIASTOLIC BLOOD PRESSURE: 71 MMHG | HEIGHT: 62 IN | BODY MASS INDEX: 17.7 KG/M2 | TEMPERATURE: 99.2 F | SYSTOLIC BLOOD PRESSURE: 113 MMHG | HEART RATE: 88 BPM

## 2025-03-26 DIAGNOSIS — F02.B0 MODERATE LATE ONSET ALZHEIMER'S DEMENTIA WITHOUT BEHAVIORAL DISTURBANCE, PSYCHOTIC DISTURBANCE, MOOD DISTURBANCE, OR ANXIETY (HCC): Chronic | ICD-10-CM

## 2025-03-26 DIAGNOSIS — G10: ICD-10-CM

## 2025-03-26 DIAGNOSIS — I77.9 DISORDER OF ARTERIES AND ARTERIOLES (HCC): ICD-10-CM

## 2025-03-26 DIAGNOSIS — G30.1 MODERATE LATE ONSET ALZHEIMER'S DEMENTIA WITHOUT BEHAVIORAL DISTURBANCE, PSYCHOTIC DISTURBANCE, MOOD DISTURBANCE, OR ANXIETY (HCC): Chronic | ICD-10-CM

## 2025-03-26 DIAGNOSIS — F33.1 MODERATE EPISODE OF RECURRENT MAJOR DEPRESSIVE DISORDER (HCC): ICD-10-CM

## 2025-03-26 DIAGNOSIS — I67.1 ANEURYSM OF MIDDLE CEREBRAL ARTERY: ICD-10-CM

## 2025-03-26 DIAGNOSIS — F02.B0: ICD-10-CM

## 2025-03-26 PROCEDURE — 3078F DIAST BP <80 MM HG: CPT | Performed by: FAMILY MEDICINE

## 2025-03-26 PROCEDURE — 99214 OFFICE O/P EST MOD 30 MIN: CPT | Performed by: FAMILY MEDICINE

## 2025-03-26 PROCEDURE — 3074F SYST BP LT 130 MM HG: CPT | Performed by: FAMILY MEDICINE

## 2025-03-26 ASSESSMENT — FIBROSIS 4 INDEX: FIB4 SCORE: 1.25

## 2025-03-28 PROBLEM — F02.B0: Status: ACTIVE | Noted: 2025-03-28

## 2025-03-28 PROBLEM — G10: Status: ACTIVE | Noted: 2025-03-28

## 2025-03-28 RX ORDER — DONEPEZIL HYDROCHLORIDE 5 MG/1
10 TABLET, FILM COATED ORAL NIGHTLY
Qty: 200 TABLET | Refills: 3 | Status: SHIPPED | OUTPATIENT
Start: 2025-03-28

## 2025-03-28 ASSESSMENT — ENCOUNTER SYMPTOMS
WEIGHT LOSS: 0
MEMORY LOSS: 1
FEVER: 0
RESPIRATORY NEGATIVE: 1
DEPRESSION: 1
HALLUCINATIONS: 0
CARDIOVASCULAR NEGATIVE: 1
NERVOUS/ANXIOUS: 1
CHILLS: 0

## 2025-03-29 NOTE — PROGRESS NOTES
"Subjective     Alisa Maria Eugenia Gandara is a 74 y.o. female who presents with Follow-Up (Alzheimer and dementia)            Followup to discuss Alzheimers diagnosis and possibility for medicaiton. No interval change since last week.         Review of Systems   Constitutional:  Negative for chills, fever and weight loss.   Respiratory: Negative.     Cardiovascular: Negative.    Genitourinary: Negative.    Psychiatric/Behavioral:  Positive for depression and memory loss. Negative for hallucinations and suicidal ideas. The patient is nervous/anxious.               Objective     /71 (BP Location: Left arm, Patient Position: Sitting, BP Cuff Size: Adult)   Pulse 88   Temp 37.3 °C (99.2 °F) (Temporal)   Ht 1.575 m (5' 2\")   Wt 43.6 kg (96 lb 3.2 oz)   LMP  (LMP Unknown)   SpO2 93%   BMI 17.60 kg/m²      Physical Exam  Constitutional:       General: She is not in acute distress.  Pulmonary:      Effort: Pulmonary effort is normal. No respiratory distress.   Neurological:      General: No focal deficit present.      Mental Status: She is alert. Mental status is at baseline.      Motor: No weakness.      Coordination: Coordination normal.   Psychiatric:         Mood and Affect: Mood is anxious.         Speech: Speech normal. Speech is not delayed or tangential.         Behavior: Behavior is slowed. Behavior is cooperative.         Thought Content: Thought content normal. Thought content does not include homicidal or suicidal ideation. Thought content does not include homicidal or suicidal plan.         Cognition and Memory: Cognition is impaired. Memory is impaired. She exhibits impaired recent memory.         Judgment: Judgment normal.       Completed MOCA and is on chart. Scoring 13/30. Previous comparison 18/30 noted from a couple of years back.                   Assessment & Plan  Moderate late onset Alzheimer's dementia without behavioral disturbance, psychotic disturbance, mood disturbance, or anxiety " (HCC)         Body mass index (BMI) of 19.0 to 19.9 in adult         Aneurysm of middle cerebral artery         Disorder of arteries and arterioles (HCC)         Moderate episode of recurrent major depressive disorder (HCC)         Start Aricept, risks, benefits, and likely disease progression discussed in detail.  Discussed family support.  Patient and  have not completed AD, information provided today.   Followup in 4 weeks, sooner if any new concerns.  Declines statin at this time.

## 2025-07-10 ENCOUNTER — TELEPHONE (OUTPATIENT)
Dept: RADIOLOGY | Facility: MEDICAL CENTER | Age: 75
End: 2025-07-10
Payer: MEDICARE

## 2025-07-10 NOTE — TELEPHONE ENCOUNTER
"Message received from [facility]  that pt's  called asking about an appt due to pt having \"symptoms.\" Returned call, spoke with Walter, who is her historian as she has dementia. He states she is having symptoms starting about 7-10 days ago with a self limited episode of \"low pressure\" in the right lower skull. Lasted a couple of minutes and resolved. Yesterday had left scalp \"pinprick\" sensation. She had a stroke 1 yr ago and she is concerned about aneurysmal change.     Pt last treated by [facility] in  and was under surveillance until . Last imaging 2024 for stroke, and MRA (reviewed with Dr. Rivero today) demonstrated no change in the followin. Unruptured left internal carotid artery aneurysm 7.5 mm in size, status post flow diverter placement.   2. Unruptured right middle cerebral artery aneurysm 4-5 mm in size, status post stent assisted coil embolization.  3. Unruptured right internal carotid artery aneurysm 11 mm in size, status post coil embolization.  4. 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     She was noted to have a left carotid stenosis 50-70% but the stroke was on the right. She is managed medically. After discussion w/pt's spouse, will order a surveillance MRA. Discussed red flag symptoms for sentinel headache and that her symptoms may be unrelated. Will follow up with them after MRA completed. Imaging scheduling number provided to the patient.   "

## 2025-07-18 ENCOUNTER — OFFICE VISIT (OUTPATIENT)
Dept: MEDICAL GROUP | Facility: CLINIC | Age: 75
End: 2025-07-18
Payer: MEDICARE

## 2025-07-18 VITALS
WEIGHT: 95.9 LBS | DIASTOLIC BLOOD PRESSURE: 64 MMHG | OXYGEN SATURATION: 90 % | SYSTOLIC BLOOD PRESSURE: 102 MMHG | HEART RATE: 90 BPM | RESPIRATION RATE: 12 BRPM | TEMPERATURE: 98.9 F | BODY MASS INDEX: 18.11 KG/M2 | HEIGHT: 61 IN

## 2025-07-18 DIAGNOSIS — Z98.890 HISTORY OF LEFT COMMON CAROTID ARTERY STENT PLACEMENT: ICD-10-CM

## 2025-07-18 DIAGNOSIS — L82.1 SEBORRHEIC KERATOSES: ICD-10-CM

## 2025-07-18 DIAGNOSIS — F32.9 REACTIVE DEPRESSION: ICD-10-CM

## 2025-07-18 DIAGNOSIS — F17.200 SMOKER: Primary | ICD-10-CM

## 2025-07-18 DIAGNOSIS — Z95.828 HISTORY OF LEFT COMMON CAROTID ARTERY STENT PLACEMENT: ICD-10-CM

## 2025-07-18 DIAGNOSIS — I77.9 DISORDER OF ARTERIES AND ARTERIOLES (HCC): ICD-10-CM

## 2025-07-18 DIAGNOSIS — G30.1 DEMENTIA OF THE ALZHEIMER'S TYPE, WITH LATE ONSET, WITH DELUSIONS (HCC): ICD-10-CM

## 2025-07-18 DIAGNOSIS — F02.818 DEMENTIA OF THE ALZHEIMER'S TYPE, WITH LATE ONSET, WITH DELUSIONS (HCC): ICD-10-CM

## 2025-07-18 DIAGNOSIS — Z71.89 COUNSELING REGARDING ADVANCED DIRECTIVES: ICD-10-CM

## 2025-07-18 DIAGNOSIS — R64 CACHEXIA (HCC): ICD-10-CM

## 2025-07-18 DIAGNOSIS — E44.0 MODERATE PROTEIN-CALORIE MALNUTRITION (HCC): ICD-10-CM

## 2025-07-18 PROCEDURE — 3074F SYST BP LT 130 MM HG: CPT | Performed by: FAMILY MEDICINE

## 2025-07-18 PROCEDURE — 3078F DIAST BP <80 MM HG: CPT | Performed by: FAMILY MEDICINE

## 2025-07-18 PROCEDURE — 99214 OFFICE O/P EST MOD 30 MIN: CPT | Performed by: FAMILY MEDICINE

## 2025-07-18 ASSESSMENT — FIBROSIS 4 INDEX: FIB4 SCORE: 1.27

## 2025-07-18 ASSESSMENT — ENCOUNTER SYMPTOMS
CHILLS: 0
CARDIOVASCULAR NEGATIVE: 1
WEIGHT LOSS: 0
SENSORY CHANGE: 0
DIZZINESS: 0
DEPRESSION: 1
RESPIRATORY NEGATIVE: 1
MUSCULOSKELETAL NEGATIVE: 1
FEVER: 0
HEADACHES: 0
MEMORY LOSS: 1
GASTROINTESTINAL NEGATIVE: 1
FOCAL WEAKNESS: 0

## 2025-07-18 NOTE — ASSESSMENT & PLAN NOTE
Patient struggles to maintain weight.  Her weight has been stable over the last 2 years.  Encouraged her to have a goal of 100 pounds.

## 2025-07-18 NOTE — ASSESSMENT & PLAN NOTE
Does not wish to stop at this time.  Is agreeable to cut back to 2 cigarettes/day.  Discussed how this would help with her vascular perfusion

## 2025-07-18 NOTE — ASSESSMENT & PLAN NOTE
Patient declined statin.  Continues with her Plavix.  Pressure is controlled and no evidence of diabetes.

## 2025-07-18 NOTE — PROGRESS NOTES
"Subjective     Alisa Maria Eugenia Gandara is a 75 y.o. female who presents with Follow-Up (Skin wart or  tag on back /MRI)            Presents for follow-up.  Overall no significant change to her symptoms or new concerns.  She does have 2 skin lesions that she would like evaluated.  She is not taking the donezepil or paroxetine.  She states she does take the clopidogrel regularly since the importance because of her prior coil.        Review of Systems   Constitutional:  Negative for chills, fever and weight loss.   HENT: Negative.     Respiratory: Negative.     Cardiovascular: Negative.    Gastrointestinal: Negative.    Genitourinary: Negative.    Musculoskeletal: Negative.    Neurological:  Negative for dizziness, sensory change, focal weakness and headaches.   Psychiatric/Behavioral:  Positive for depression and memory loss.               Objective     /64 (BP Location: Left arm, Patient Position: Sitting, BP Cuff Size: Adult)   Pulse 90   Temp 37.2 °C (98.9 °F) (Temporal)   Resp 12   Ht 1.537 m (5' 0.5\")   Wt 43.5 kg (95 lb 14.4 oz)   LMP  (LMP Unknown)   SpO2 90%   BMI 18.42 kg/m²      Physical Exam  Constitutional:       General: She is not in acute distress.  Eyes:      General: No scleral icterus.  Pulmonary:      Effort: Pulmonary effort is normal. No respiratory distress.   Musculoskeletal:      Right lower leg: No edema.      Left lower leg: No edema.   Skin:     Findings: Lesion (raised, well-defined dark lesions. One on left elbow and one on back.) present.   Neurological:      General: No focal deficit present.      Mental Status: She is alert.      Cranial Nerves: No cranial nerve deficit.      Motor: No weakness.   Psychiatric:         Mood and Affect: Mood normal.         Behavior: Behavior normal.         Thought Content: Thought content normal.         Cognition and Memory: Cognition is impaired. Memory is impaired. She exhibits impaired recent memory and impaired remote memory.         " Judgment: Judgment normal.            Assessment & Plan  Smoker  Does not wish to stop at this time.  Is agreeable to cut back to 2 cigarettes/day.  Discussed how this would help with her vascular perfusion       Reactive depression  Encouraged patient to continue taking the paroxetine regularly.  In the past she stated it helps.  Discussed opportunities to keep patient connected with patient and .       Moderate protein-calorie malnutrition (HCC)  Patient struggles to maintain weight.  Her weight has been stable over the last 2 years.  Encouraged her to have a goal of 100 pounds.       History of left common carotid artery stent placement  Patient declined statin.  Continues with her Plavix.  Pressure is controlled and no evidence of diabetes.       Dementia of the Alzheimer's type, with late onset, with delusions (HCC)  Discussed taking benazepril.  We discussed risk and benefits and patient is agreeable to start back again.  Reviewed with  that this medication will not reverse any symptoms but aims to slow progression.       Disorder of arteries and arterioles (HCC)  Recommend risk reduction with statin and smoking cessation.       Cachexia (HCC)  As above.  Patient does a good job of maintaining a wide variety of foods.       Seborrheic keratoses  Discussed the benign nature of these.  Photos taken are in chart.  Will continue to monitor.       Counseling regarding advanced directives  With patient and .  Provided them with a sample handout.  Encouraged them to complete this prior to her next visit.

## 2025-07-18 NOTE — ASSESSMENT & PLAN NOTE
Discussed taking benazepril.  We discussed risk and benefits and patient is agreeable to start back again.  Reviewed with  that this medication will not reverse any symptoms but aims to slow progression.

## 2025-07-18 NOTE — ASSESSMENT & PLAN NOTE
Encouraged patient to continue taking the paroxetine regularly.  In the past she stated it helps.  Discussed opportunities to keep patient connected with patient and .

## 2025-08-02 ENCOUNTER — APPOINTMENT (OUTPATIENT)
Dept: RADIOLOGY | Facility: MEDICAL CENTER | Age: 75
End: 2025-08-02
Attending: EMERGENCY MEDICINE
Payer: MEDICARE

## 2025-08-02 ENCOUNTER — HOSPITAL ENCOUNTER (OUTPATIENT)
Facility: MEDICAL CENTER | Age: 75
End: 2025-08-03
Attending: EMERGENCY MEDICINE | Admitting: FAMILY MEDICINE
Payer: MEDICARE

## 2025-08-02 DIAGNOSIS — G45.9 TIA (TRANSIENT ISCHEMIC ATTACK): Primary | ICD-10-CM

## 2025-08-02 LAB
ABO GROUP BLD: NORMAL
ALBUMIN SERPL BCP-MCNC: 4.5 G/DL (ref 3.2–4.9)
ALBUMIN/GLOB SERPL: 1.6 G/DL
ALP SERPL-CCNC: 124 U/L (ref 30–99)
ALT SERPL-CCNC: 10 U/L (ref 2–50)
ANION GAP SERPL CALC-SCNC: 15 MMOL/L (ref 7–16)
APTT PPP: 29.7 SEC (ref 24.7–36)
AST SERPL-CCNC: 20 U/L (ref 12–45)
BASOPHILS # BLD AUTO: 0.6 % (ref 0–1.8)
BASOPHILS # BLD: 0.04 K/UL (ref 0–0.12)
BILIRUB SERPL-MCNC: 0.4 MG/DL (ref 0.1–1.5)
BLD GP AB SCN SERPL QL: NORMAL
BUN SERPL-MCNC: 13 MG/DL (ref 8–22)
CALCIUM ALBUM COR SERPL-MCNC: 9.5 MG/DL (ref 8.5–10.5)
CALCIUM SERPL-MCNC: 9.9 MG/DL (ref 8.5–10.5)
CHLORIDE SERPL-SCNC: 105 MMOL/L (ref 96–112)
CHOLEST SERPL-MCNC: 378 MG/DL (ref 100–199)
CO2 SERPL-SCNC: 19 MMOL/L (ref 20–33)
CREAT SERPL-MCNC: 0.74 MG/DL (ref 0.5–1.4)
EKG IMPRESSION: NORMAL
EOSINOPHIL # BLD AUTO: 0.13 K/UL (ref 0–0.51)
EOSINOPHIL NFR BLD: 2 % (ref 0–6.9)
ERYTHROCYTE [DISTWIDTH] IN BLOOD BY AUTOMATED COUNT: 46.7 FL (ref 35.9–50)
EST. AVERAGE GLUCOSE BLD GHB EST-MCNC: 126 MG/DL
GFR SERPLBLD CREATININE-BSD FMLA CKD-EPI: 83 ML/MIN/1.73 M 2
GLOBULIN SER CALC-MCNC: 2.8 G/DL (ref 1.9–3.5)
GLUCOSE BLD STRIP.AUTO-MCNC: 120 MG/DL (ref 65–99)
GLUCOSE SERPL-MCNC: 108 MG/DL (ref 65–99)
HBA1C MFR BLD: 6 % (ref 4–5.6)
HCT VFR BLD AUTO: 40 % (ref 37–47)
HDLC SERPL-MCNC: 53 MG/DL
HGB BLD-MCNC: 13.2 G/DL (ref 12–16)
IMM GRANULOCYTES # BLD AUTO: 0.03 K/UL (ref 0–0.11)
IMM GRANULOCYTES NFR BLD AUTO: 0.5 % (ref 0–0.9)
INR PPP: 0.99 (ref 0.87–1.13)
LDLC SERPL CALC-MCNC: ABNORMAL MG/DL
LYMPHOCYTES # BLD AUTO: 2.15 K/UL (ref 1–4.8)
LYMPHOCYTES NFR BLD: 33.1 % (ref 22–41)
MCH RBC QN AUTO: 30.8 PG (ref 27–33)
MCHC RBC AUTO-ENTMCNC: 33 G/DL (ref 32.2–35.5)
MCV RBC AUTO: 93.5 FL (ref 81.4–97.8)
MONOCYTES # BLD AUTO: 0.42 K/UL (ref 0–0.85)
MONOCYTES NFR BLD AUTO: 6.5 % (ref 0–13.4)
NEUTROPHILS # BLD AUTO: 3.72 K/UL (ref 1.82–7.42)
NEUTROPHILS NFR BLD: 57.3 % (ref 44–72)
NRBC # BLD AUTO: 0 K/UL
NRBC BLD-RTO: 0 /100 WBC (ref 0–0.2)
PLATELET # BLD AUTO: 279 K/UL (ref 164–446)
PMV BLD AUTO: 9.2 FL (ref 9–12.9)
POTASSIUM SERPL-SCNC: 3.9 MMOL/L (ref 3.6–5.5)
PROT SERPL-MCNC: 7.3 G/DL (ref 6–8.2)
PROTHROMBIN TIME: 13.1 SEC (ref 12–14.6)
RBC # BLD AUTO: 4.28 M/UL (ref 4.2–5.4)
RH BLD: NORMAL
SODIUM SERPL-SCNC: 139 MMOL/L (ref 135–145)
TRIGL SERPL-MCNC: 486 MG/DL (ref 0–149)
TROPONIN T SERPL-MCNC: 7 NG/L (ref 6–19)
WBC # BLD AUTO: 6.5 K/UL (ref 4.8–10.8)

## 2025-08-02 PROCEDURE — 80061 LIPID PANEL: CPT

## 2025-08-02 PROCEDURE — 86850 RBC ANTIBODY SCREEN: CPT

## 2025-08-02 PROCEDURE — G0378 HOSPITAL OBSERVATION PER HR: HCPCS

## 2025-08-02 PROCEDURE — 86900 BLOOD TYPING SEROLOGIC ABO: CPT

## 2025-08-02 PROCEDURE — 93005 ELECTROCARDIOGRAM TRACING: CPT | Mod: TC | Performed by: EMERGENCY MEDICINE

## 2025-08-02 PROCEDURE — 700117 HCHG RX CONTRAST REV CODE 255: Performed by: EMERGENCY MEDICINE

## 2025-08-02 PROCEDURE — 82962 GLUCOSE BLOOD TEST: CPT

## 2025-08-02 PROCEDURE — 0042T CT-CEREBRAL PERFUSION ANALYSIS: CPT

## 2025-08-02 PROCEDURE — 86901 BLOOD TYPING SEROLOGIC RH(D): CPT

## 2025-08-02 PROCEDURE — 84484 ASSAY OF TROPONIN QUANT: CPT

## 2025-08-02 PROCEDURE — 70450 CT HEAD/BRAIN W/O DYE: CPT

## 2025-08-02 PROCEDURE — 70498 CT ANGIOGRAPHY NECK: CPT

## 2025-08-02 PROCEDURE — 71045 X-RAY EXAM CHEST 1 VIEW: CPT

## 2025-08-02 PROCEDURE — 99285 EMERGENCY DEPT VISIT HI MDM: CPT

## 2025-08-02 PROCEDURE — 80053 COMPREHEN METABOLIC PANEL: CPT

## 2025-08-02 PROCEDURE — 85610 PROTHROMBIN TIME: CPT

## 2025-08-02 PROCEDURE — 85730 THROMBOPLASTIN TIME PARTIAL: CPT

## 2025-08-02 PROCEDURE — 99222 1ST HOSP IP/OBS MODERATE 55: CPT

## 2025-08-02 PROCEDURE — 70496 CT ANGIOGRAPHY HEAD: CPT

## 2025-08-02 PROCEDURE — 85025 COMPLETE CBC W/AUTO DIFF WBC: CPT

## 2025-08-02 PROCEDURE — 36415 COLL VENOUS BLD VENIPUNCTURE: CPT

## 2025-08-02 PROCEDURE — 83036 HEMOGLOBIN GLYCOSYLATED A1C: CPT

## 2025-08-02 RX ORDER — ENOXAPARIN SODIUM 100 MG/ML
40 INJECTION SUBCUTANEOUS DAILY
Status: DISCONTINUED | OUTPATIENT
Start: 2025-08-02 | End: 2025-08-03 | Stop reason: HOSPADM

## 2025-08-02 RX ORDER — CLOPIDOGREL BISULFATE 75 MG/1
75 TABLET ORAL DAILY
Status: DISCONTINUED | OUTPATIENT
Start: 2025-08-02 | End: 2025-08-03 | Stop reason: HOSPADM

## 2025-08-02 RX ORDER — POLYETHYLENE GLYCOL 3350 17 G/17G
1 POWDER, FOR SOLUTION ORAL
Status: DISCONTINUED | OUTPATIENT
Start: 2025-08-02 | End: 2025-08-03 | Stop reason: HOSPADM

## 2025-08-02 RX ORDER — AMOXICILLIN 250 MG
2 CAPSULE ORAL EVERY EVENING
Status: DISCONTINUED | OUTPATIENT
Start: 2025-08-02 | End: 2025-08-03 | Stop reason: HOSPADM

## 2025-08-02 RX ORDER — ACETAMINOPHEN 325 MG/1
650 TABLET ORAL EVERY 6 HOURS PRN
Status: DISCONTINUED | OUTPATIENT
Start: 2025-08-02 | End: 2025-08-03 | Stop reason: HOSPADM

## 2025-08-02 RX ADMIN — IOHEXOL 40 ML: 350 INJECTION, SOLUTION INTRAVENOUS at 16:00

## 2025-08-02 RX ADMIN — IOHEXOL 80 ML: 350 INJECTION, SOLUTION INTRAVENOUS at 16:00

## 2025-08-02 ASSESSMENT — COGNITIVE AND FUNCTIONAL STATUS - GENERAL
MOBILITY SCORE: 24
SUGGESTED CMS G CODE MODIFIER DAILY ACTIVITY: CJ
EATING MEALS: A LITTLE
TOILETING: A LITTLE
SUGGESTED CMS G CODE MODIFIER MOBILITY: CH
DRESSING REGULAR UPPER BODY CLOTHING: A LITTLE
PERSONAL GROOMING: A LITTLE
DAILY ACTIVITIY SCORE: 20

## 2025-08-02 ASSESSMENT — LIFESTYLE VARIABLES
CONSUMPTION TOTAL: NEGATIVE
HAVE PEOPLE ANNOYED YOU BY CRITICIZING YOUR DRINKING: NO
TOTAL SCORE: 0
HOW MANY TIMES IN THE PAST YEAR HAVE YOU HAD 5 OR MORE DRINKS IN A DAY: 0
AVERAGE NUMBER OF DAYS PER WEEK YOU HAVE A DRINK CONTAINING ALCOHOL: 0
ALCOHOL_USE: NO
EVER FELT BAD OR GUILTY ABOUT YOUR DRINKING: NO
EVER HAD A DRINK FIRST THING IN THE MORNING TO STEADY YOUR NERVES TO GET RID OF A HANGOVER: NO
ON A TYPICAL DAY WHEN YOU DRINK ALCOHOL HOW MANY DRINKS DO YOU HAVE: 0
TOTAL SCORE: 0
HAVE YOU EVER FELT YOU SHOULD CUT DOWN ON YOUR DRINKING: NO
TOTAL SCORE: 0

## 2025-08-02 ASSESSMENT — PATIENT HEALTH QUESTIONNAIRE - PHQ9
2. FEELING DOWN, DEPRESSED, IRRITABLE, OR HOPELESS: NOT AT ALL
SUM OF ALL RESPONSES TO PHQ9 QUESTIONS 1 AND 2: 0
2. FEELING DOWN, DEPRESSED, IRRITABLE, OR HOPELESS: NOT AT ALL
SUM OF ALL RESPONSES TO PHQ9 QUESTIONS 1 AND 2: 0
1. LITTLE INTEREST OR PLEASURE IN DOING THINGS: NOT AT ALL
1. LITTLE INTEREST OR PLEASURE IN DOING THINGS: NOT AT ALL

## 2025-08-02 ASSESSMENT — FIBROSIS 4 INDEX
FIB4 SCORE: 1.27
FIB4 SCORE: 1.75

## 2025-08-02 ASSESSMENT — PAIN DESCRIPTION - PAIN TYPE: TYPE: ACUTE PAIN

## 2025-08-03 ENCOUNTER — APPOINTMENT (OUTPATIENT)
Dept: RADIOLOGY | Facility: MEDICAL CENTER | Age: 75
End: 2025-08-03
Payer: MEDICARE

## 2025-08-03 VITALS
OXYGEN SATURATION: 93 % | HEIGHT: 60 IN | RESPIRATION RATE: 16 BRPM | SYSTOLIC BLOOD PRESSURE: 117 MMHG | DIASTOLIC BLOOD PRESSURE: 74 MMHG | WEIGHT: 95.68 LBS | HEART RATE: 77 BPM | BODY MASS INDEX: 18.78 KG/M2 | TEMPERATURE: 97.5 F

## 2025-08-03 PROBLEM — G45.9 TIA (TRANSIENT ISCHEMIC ATTACK): Status: RESOLVED | Noted: 2025-08-02 | Resolved: 2025-08-03

## 2025-08-03 LAB
ALBUMIN SERPL BCP-MCNC: 3.9 G/DL (ref 3.2–4.9)
ALBUMIN/GLOB SERPL: 1.7 G/DL
ALP SERPL-CCNC: 107 U/L (ref 30–99)
ALT SERPL-CCNC: 9 U/L (ref 2–50)
ANION GAP SERPL CALC-SCNC: 12 MMOL/L (ref 7–16)
AST SERPL-CCNC: 19 U/L (ref 12–45)
BILIRUB SERPL-MCNC: 0.5 MG/DL (ref 0.1–1.5)
BUN SERPL-MCNC: 10 MG/DL (ref 8–22)
CALCIUM ALBUM COR SERPL-MCNC: 9.2 MG/DL (ref 8.5–10.5)
CALCIUM SERPL-MCNC: 9.1 MG/DL (ref 8.5–10.5)
CHLORIDE SERPL-SCNC: 106 MMOL/L (ref 96–112)
CO2 SERPL-SCNC: 20 MMOL/L (ref 20–33)
CREAT SERPL-MCNC: 0.61 MG/DL (ref 0.5–1.4)
ERYTHROCYTE [DISTWIDTH] IN BLOOD BY AUTOMATED COUNT: 46.8 FL (ref 35.9–50)
GFR SERPLBLD CREATININE-BSD FMLA CKD-EPI: 92 ML/MIN/1.73 M 2
GLOBULIN SER CALC-MCNC: 2.3 G/DL (ref 1.9–3.5)
GLUCOSE SERPL-MCNC: 89 MG/DL (ref 65–99)
HCT VFR BLD AUTO: 37.6 % (ref 37–47)
HGB BLD-MCNC: 12.3 G/DL (ref 12–16)
MCH RBC QN AUTO: 30.4 PG (ref 27–33)
MCHC RBC AUTO-ENTMCNC: 32.7 G/DL (ref 32.2–35.5)
MCV RBC AUTO: 92.8 FL (ref 81.4–97.8)
PLATELET # BLD AUTO: 269 K/UL (ref 164–446)
PMV BLD AUTO: 9.8 FL (ref 9–12.9)
POTASSIUM SERPL-SCNC: 3.8 MMOL/L (ref 3.6–5.5)
PROT SERPL-MCNC: 6.2 G/DL (ref 6–8.2)
RBC # BLD AUTO: 4.05 M/UL (ref 4.2–5.4)
SODIUM SERPL-SCNC: 138 MMOL/L (ref 135–145)
WBC # BLD AUTO: 5.7 K/UL (ref 4.8–10.8)

## 2025-08-03 PROCEDURE — 99239 HOSP IP/OBS DSCHRG MGMT >30: CPT | Mod: FS | Performed by: INTERNAL MEDICINE

## 2025-08-03 PROCEDURE — 99407 BEHAV CHNG SMOKING > 10 MIN: CPT | Mod: 25 | Performed by: INTERNAL MEDICINE

## 2025-08-03 PROCEDURE — A9270 NON-COVERED ITEM OR SERVICE: HCPCS

## 2025-08-03 PROCEDURE — 70551 MRI BRAIN STEM W/O DYE: CPT

## 2025-08-03 PROCEDURE — 85027 COMPLETE CBC AUTOMATED: CPT

## 2025-08-03 PROCEDURE — 700102 HCHG RX REV CODE 250 W/ 637 OVERRIDE(OP)

## 2025-08-03 PROCEDURE — G0378 HOSPITAL OBSERVATION PER HR: HCPCS

## 2025-08-03 PROCEDURE — 80053 COMPREHEN METABOLIC PANEL: CPT

## 2025-08-03 RX ORDER — ATORVASTATIN CALCIUM 40 MG/1
40 TABLET, FILM COATED ORAL NIGHTLY
Qty: 90 TABLET | Refills: 0 | Status: SHIPPED | OUTPATIENT
Start: 2025-08-03 | End: 2025-11-01

## 2025-08-03 RX ADMIN — CLOPIDOGREL BISULFATE 75 MG: 75 TABLET, FILM COATED ORAL at 05:33

## 2025-08-08 ENCOUNTER — TELEPHONE (OUTPATIENT)
Dept: NEUROLOGY | Facility: MEDICAL CENTER | Age: 75
End: 2025-08-08
Payer: MEDICARE

## 2025-08-09 ENCOUNTER — APPOINTMENT (OUTPATIENT)
Dept: RADIOLOGY | Facility: MEDICAL CENTER | Age: 75
End: 2025-08-09
Attending: EMERGENCY MEDICINE
Payer: MEDICARE

## 2025-08-09 ENCOUNTER — HOSPITAL ENCOUNTER (EMERGENCY)
Facility: MEDICAL CENTER | Age: 75
End: 2025-08-09
Attending: EMERGENCY MEDICINE
Payer: MEDICARE

## 2025-08-09 VITALS
BODY MASS INDEX: 18.65 KG/M2 | OXYGEN SATURATION: 97 % | WEIGHT: 95.02 LBS | TEMPERATURE: 98 F | RESPIRATION RATE: 12 BRPM | HEIGHT: 60 IN | DIASTOLIC BLOOD PRESSURE: 107 MMHG | SYSTOLIC BLOOD PRESSURE: 145 MMHG | HEART RATE: 57 BPM

## 2025-08-09 DIAGNOSIS — G45.9 TIA (TRANSIENT ISCHEMIC ATTACK): ICD-10-CM

## 2025-08-09 DIAGNOSIS — R20.2 PARESTHESIAS: Primary | ICD-10-CM

## 2025-08-09 LAB
ALBUMIN SERPL BCP-MCNC: 4.5 G/DL (ref 3.2–4.9)
ALBUMIN/GLOB SERPL: 1.8 G/DL
ALP SERPL-CCNC: 120 U/L (ref 30–99)
ALT SERPL-CCNC: 12 U/L (ref 2–50)
ANION GAP SERPL CALC-SCNC: 13 MMOL/L (ref 7–16)
AST SERPL-CCNC: 23 U/L (ref 12–45)
BASOPHILS # BLD AUTO: 0.5 % (ref 0–1.8)
BASOPHILS # BLD: 0.03 K/UL (ref 0–0.12)
BILIRUB SERPL-MCNC: 0.4 MG/DL (ref 0.1–1.5)
BUN SERPL-MCNC: 13 MG/DL (ref 8–22)
CALCIUM ALBUM COR SERPL-MCNC: 9 MG/DL (ref 8.5–10.5)
CALCIUM SERPL-MCNC: 9.4 MG/DL (ref 8.5–10.5)
CHLORIDE SERPL-SCNC: 104 MMOL/L (ref 96–112)
CO2 SERPL-SCNC: 20 MMOL/L (ref 20–33)
CREAT SERPL-MCNC: 0.7 MG/DL (ref 0.5–1.4)
EOSINOPHIL # BLD AUTO: 0.12 K/UL (ref 0–0.51)
EOSINOPHIL NFR BLD: 1.8 % (ref 0–6.9)
ERYTHROCYTE [DISTWIDTH] IN BLOOD BY AUTOMATED COUNT: 46.2 FL (ref 35.9–50)
GFR SERPLBLD CREATININE-BSD FMLA CKD-EPI: 89 ML/MIN/1.73 M 2
GLOBULIN SER CALC-MCNC: 2.5 G/DL (ref 1.9–3.5)
GLUCOSE BLD STRIP.AUTO-MCNC: 109 MG/DL (ref 65–99)
GLUCOSE SERPL-MCNC: 98 MG/DL (ref 65–99)
HCT VFR BLD AUTO: 39.3 % (ref 37–47)
HGB BLD-MCNC: 13.3 G/DL (ref 12–16)
HOLDING TUBE BB 8507: NORMAL
IMM GRANULOCYTES # BLD AUTO: 0.01 K/UL (ref 0–0.11)
IMM GRANULOCYTES NFR BLD AUTO: 0.2 % (ref 0–0.9)
LYMPHOCYTES # BLD AUTO: 2.04 K/UL (ref 1–4.8)
LYMPHOCYTES NFR BLD: 31.4 % (ref 22–41)
MCH RBC QN AUTO: 31.2 PG (ref 27–33)
MCHC RBC AUTO-ENTMCNC: 33.8 G/DL (ref 32.2–35.5)
MCV RBC AUTO: 92.3 FL (ref 81.4–97.8)
MONOCYTES # BLD AUTO: 0.5 K/UL (ref 0–0.85)
MONOCYTES NFR BLD AUTO: 7.7 % (ref 0–13.4)
NEUTROPHILS # BLD AUTO: 3.8 K/UL (ref 1.82–7.42)
NEUTROPHILS NFR BLD: 58.4 % (ref 44–72)
NRBC # BLD AUTO: 0 K/UL
NRBC BLD-RTO: 0 /100 WBC (ref 0–0.2)
PLATELET # BLD AUTO: 319 K/UL (ref 164–446)
PMV BLD AUTO: 9.9 FL (ref 9–12.9)
POTASSIUM SERPL-SCNC: 4.2 MMOL/L (ref 3.6–5.5)
PROT SERPL-MCNC: 7 G/DL (ref 6–8.2)
RBC # BLD AUTO: 4.26 M/UL (ref 4.2–5.4)
SODIUM SERPL-SCNC: 137 MMOL/L (ref 135–145)
WBC # BLD AUTO: 6.5 K/UL (ref 4.8–10.8)

## 2025-08-09 PROCEDURE — 70450 CT HEAD/BRAIN W/O DYE: CPT

## 2025-08-09 PROCEDURE — 82962 GLUCOSE BLOOD TEST: CPT

## 2025-08-09 PROCEDURE — 99285 EMERGENCY DEPT VISIT HI MDM: CPT

## 2025-08-09 PROCEDURE — 94760 N-INVAS EAR/PLS OXIMETRY 1: CPT

## 2025-08-09 PROCEDURE — 36415 COLL VENOUS BLD VENIPUNCTURE: CPT

## 2025-08-09 PROCEDURE — 80053 COMPREHEN METABOLIC PANEL: CPT

## 2025-08-09 PROCEDURE — 85025 COMPLETE CBC W/AUTO DIFF WBC: CPT

## 2025-08-09 RX ORDER — ONDANSETRON 2 MG/ML
4 INJECTION INTRAMUSCULAR; INTRAVENOUS ONCE
Status: DISCONTINUED | OUTPATIENT
Start: 2025-08-09 | End: 2025-08-09

## 2025-08-09 ASSESSMENT — FIBROSIS 4 INDEX: FIB4 SCORE: 1.81

## 2025-08-09 ASSESSMENT — PAIN DESCRIPTION - PAIN TYPE: TYPE: ACUTE PAIN

## 2025-10-01 ENCOUNTER — APPOINTMENT (OUTPATIENT)
Dept: MEDICAL GROUP | Facility: CLINIC | Age: 75
End: 2025-10-01
Payer: MEDICARE

## (undated) DEVICE — TUBING CLEARLINK DUO-VENT - C-FLO (48EA/CA)

## (undated) DEVICE — NEEDLE NON SAFETY 25 GA X 1 1/2 IN HYPO (100EA/BX)

## (undated) DEVICE — LACTATED RINGERS INJ 1000 ML - (14EA/CA 60CA/PF)

## (undated) DEVICE — ELECTRODE 850 FOAM ADHESIVE - HYDROGEL RADIOTRNSPRNT (50/PK)

## (undated) DEVICE — SYRINGE SAFETY 10 ML 18 GA X 1 1/2 BLUNT LL (100/BX 4BX/CA)

## (undated) DEVICE — KIT ROOM DECONTAMINATION

## (undated) DEVICE — SODIUM CHL IRRIGATION 0.9% 1000ML (12EA/CA)

## (undated) DEVICE — DRAPE LARGE 3 QUARTER - (20/CA)

## (undated) DEVICE — SUTURE 4-0 VICRYL PLUS RB-1 - 27 INCH (36/BX)

## (undated) DEVICE — DISH PETRI STERILE (50EA/CA)

## (undated) DEVICE — SUTURE GENERAL

## (undated) DEVICE — DECANTER FLD BLS - (50/CA)

## (undated) DEVICE — SYRINGE 10 ML CONTROL LL (25EA/BX 4BX/CA)

## (undated) DEVICE — DRAPE C-ARM LARGE 41IN X 74 IN - (10/BX 2BX/CA)

## (undated) DEVICE — DRAPE SURGICAL U 77X120 - (10/CA)

## (undated) DEVICE — BAG DECANTER (50EA/CS)

## (undated) DEVICE — DRESSING TRANSPARENT FILM TEGADERM 4 X 4.75" (50EA/BX)"

## (undated) DEVICE — VESSELOOP MINI BLUE STERILE - SURG-I-LOOP (10EA/BX)

## (undated) DEVICE — SUTURE 4-0 MONOCRYL PLUS PS-2 - 27 INCH (36/BX)

## (undated) DEVICE — SUTURE 3-0 VICRYL PLUS SH - 8X 18 INCH (12/BX)

## (undated) DEVICE — BLADE SURGICAL #15 - (50/BX 3BX/CA)

## (undated) DEVICE — KIT ANESTHESIA W/CIRCUIT & 3/LT BAG W/FILTER (20EA/CA)

## (undated) DEVICE — SUTURE CV

## (undated) DEVICE — ELECTRODE DUAL RETURN W/ CORD - (50/PK)

## (undated) DEVICE — CLIP SM INTNL HRZN TI ESCP LGT - (24EA/PK 25PK/BX)

## (undated) DEVICE — BLADE SURGICAL #11 - (50/BX)

## (undated) DEVICE — VESSELOOP MAXI BLUE STERILE- SURG-I-LOOP (10EA/BX)

## (undated) DEVICE — PROTECTOR ULNA NERVE - (36PR/CA)

## (undated) DEVICE — MASK ANESTHESIA ADULT  - (100/CA)

## (undated) DEVICE — PAD LAP STERILE 18 X 18 - (5/PK 40PK/CA)

## (undated) DEVICE — GLOVE SZ 6 BIOGEL PI MICRO - PF LF (50PR/BX 4BX/CA)

## (undated) DEVICE — GELAQUASONIC 100 ULTRASOUND - 48/BX 20GM STERILE FOIL POUCH

## (undated) DEVICE — SENSOR SPO2 NEO LNCS ADHESIVE (20/BX) SEE USER NOTES

## (undated) DEVICE — CANISTER SUCTION 3000ML MECHANICAL FILTER AUTO SHUTOFF MEDI-VAC NONSTERILE LF DISP  (40EA/CA)

## (undated) DEVICE — GLOVE BIOGEL SZ 6.5 SURGICAL PF LTX (50PR/BX 4BX/CA)

## (undated) DEVICE — PACK MINOR BASIN - (2EA/CA)

## (undated) DEVICE — CATHETER EMBOLECTOMY 4FR (5EA/CA)

## (undated) DEVICE — SODIUM CHL. INJ. 0.9% 500ML (24EA/CA 50CA/PF)

## (undated) DEVICE — CHLORAPREP 26 ML APPLICATOR - ORANGE TINT(25/CA)

## (undated) DEVICE — CLIP MED INTNL HRZN TI ESCP - (25/BX)

## (undated) DEVICE — PAD PREP 24 X 48 CUFFED - (100/CA)

## (undated) DEVICE — GOWN WARMING STANDARD FLEX - (30/CA)

## (undated) DEVICE — SET EXTENSION WITH 2 PORTS (48EA/CA) ***PART #2C8610 IS A SUBSTITUTE*****

## (undated) DEVICE — SUTURE 6-0 PROLENE BV-1 D/A 30 (36PK/BX)"

## (undated) DEVICE — HEAD HOLDER JUNIOR/ADULT

## (undated) DEVICE — SYRINGE 20 ML LL (50EA/BX 4BX/CA)

## (undated) DEVICE — SUCTION INSTRUMENT YANKAUER BULBOUS TIP W/O VENT (50EA/CA)

## (undated) DEVICE — SURGIFOAM (12X7) - (12EA/CA)

## (undated) DEVICE — NEPTUNE 4 PORT MANIFOLD - (20/PK)

## (undated) DEVICE — SET LEADWIRE 5 LEAD BEDSIDE DISPOSABLE ECG (1SET OF 5/EA)

## (undated) DEVICE — DRESSING TRANSPARENT FILM TEGADERM 2.375 X 2.75"  (100EA/BX)"

## (undated) DEVICE — SPONGE GAUZESTER. 2X2 4-PL - (2/PK 50PK/BX 30BX/CS)

## (undated) DEVICE — GLOVE BIOGEL PI INDICATOR SZ 6.5 SURGICAL PF LF - (50/BX 4BX/CA)

## (undated) DEVICE — SLEEVE, VASO, THIGH, MED